# Patient Record
Sex: FEMALE | Race: WHITE | Employment: UNEMPLOYED | URBAN - METROPOLITAN AREA
[De-identification: names, ages, dates, MRNs, and addresses within clinical notes are randomized per-mention and may not be internally consistent; named-entity substitution may affect disease eponyms.]

---

## 2017-06-26 ENCOUNTER — OFFICE VISIT (OUTPATIENT)
Dept: ORTHOPEDIC SURGERY | Age: 77
End: 2017-06-26

## 2017-06-26 VITALS — WEIGHT: 289 LBS | HEIGHT: 66 IN | BODY MASS INDEX: 46.45 KG/M2

## 2017-06-26 DIAGNOSIS — M25.511 RIGHT SHOULDER PAIN, UNSPECIFIED CHRONICITY: ICD-10-CM

## 2017-06-26 DIAGNOSIS — M25.561 PAIN IN BOTH KNEES, UNSPECIFIED CHRONICITY: Primary | ICD-10-CM

## 2017-06-26 DIAGNOSIS — M25.562 PAIN IN BOTH KNEES, UNSPECIFIED CHRONICITY: Primary | ICD-10-CM

## 2017-06-26 DIAGNOSIS — M79.671 RIGHT FOOT PAIN: ICD-10-CM

## 2017-06-26 PROCEDURE — 20610 DRAIN/INJ JOINT/BURSA W/O US: CPT | Performed by: ORTHOPAEDIC SURGERY

## 2017-06-26 PROCEDURE — 3017F COLORECTAL CA SCREEN DOC REV: CPT | Performed by: ORTHOPAEDIC SURGERY

## 2017-06-26 PROCEDURE — 73562 X-RAY EXAM OF KNEE 3: CPT | Performed by: ORTHOPAEDIC SURGERY

## 2017-06-26 PROCEDURE — 1123F ACP DISCUSS/DSCN MKR DOCD: CPT | Performed by: ORTHOPAEDIC SURGERY

## 2017-06-26 PROCEDURE — 1036F TOBACCO NON-USER: CPT | Performed by: ORTHOPAEDIC SURGERY

## 2017-06-26 PROCEDURE — 99214 OFFICE O/P EST MOD 30 MIN: CPT | Performed by: ORTHOPAEDIC SURGERY

## 2017-06-26 PROCEDURE — G8427 DOCREV CUR MEDS BY ELIG CLIN: HCPCS | Performed by: ORTHOPAEDIC SURGERY

## 2017-06-26 PROCEDURE — L3260 AMBULATORY SURGICAL BOOT EAC: HCPCS | Performed by: ORTHOPAEDIC SURGERY

## 2017-06-26 PROCEDURE — G8417 CALC BMI ABV UP PARAM F/U: HCPCS | Performed by: ORTHOPAEDIC SURGERY

## 2017-06-26 PROCEDURE — 1090F PRES/ABSN URINE INCON ASSESS: CPT | Performed by: ORTHOPAEDIC SURGERY

## 2017-06-26 PROCEDURE — 73630 X-RAY EXAM OF FOOT: CPT | Performed by: ORTHOPAEDIC SURGERY

## 2017-06-26 PROCEDURE — G8400 PT W/DXA NO RESULTS DOC: HCPCS | Performed by: ORTHOPAEDIC SURGERY

## 2017-06-26 PROCEDURE — 73030 X-RAY EXAM OF SHOULDER: CPT | Performed by: ORTHOPAEDIC SURGERY

## 2017-06-26 PROCEDURE — 4040F PNEUMOC VAC/ADMIN/RCVD: CPT | Performed by: ORTHOPAEDIC SURGERY

## 2017-11-17 PROBLEM — K85.90 ACUTE PANCREATITIS: Status: ACTIVE | Noted: 2017-11-17

## 2018-05-07 ENCOUNTER — OFFICE VISIT (OUTPATIENT)
Dept: ORTHOPEDIC SURGERY | Age: 78
End: 2018-05-07

## 2018-05-07 VITALS — HEIGHT: 65 IN | WEIGHT: 265 LBS | BODY MASS INDEX: 44.15 KG/M2

## 2018-05-07 DIAGNOSIS — M25.562 PAIN IN BOTH KNEES, UNSPECIFIED CHRONICITY: Primary | ICD-10-CM

## 2018-05-07 DIAGNOSIS — M25.561 PAIN IN BOTH KNEES, UNSPECIFIED CHRONICITY: Primary | ICD-10-CM

## 2018-05-07 DIAGNOSIS — M84.374S STRESS FRACTURE, RIGHT FOOT, SEQUELA: ICD-10-CM

## 2018-05-07 DIAGNOSIS — M19.071 PRIMARY OSTEOARTHRITIS OF RIGHT FOOT: ICD-10-CM

## 2018-05-07 DIAGNOSIS — M48.56XS NON-TRAUMATIC COMPRESSION FRACTURE OF FIFTH LUMBAR VERTEBRA, SEQUELA: ICD-10-CM

## 2018-05-07 PROCEDURE — G8400 PT W/DXA NO RESULTS DOC: HCPCS | Performed by: ORTHOPAEDIC SURGERY

## 2018-05-07 PROCEDURE — L4361 PNEUMA/VAC WALK BOOT PRE OTS: HCPCS | Performed by: ORTHOPAEDIC SURGERY

## 2018-05-07 PROCEDURE — 1090F PRES/ABSN URINE INCON ASSESS: CPT | Performed by: ORTHOPAEDIC SURGERY

## 2018-05-07 PROCEDURE — 4040F PNEUMOC VAC/ADMIN/RCVD: CPT | Performed by: ORTHOPAEDIC SURGERY

## 2018-05-07 PROCEDURE — G8427 DOCREV CUR MEDS BY ELIG CLIN: HCPCS | Performed by: ORTHOPAEDIC SURGERY

## 2018-05-07 PROCEDURE — 1036F TOBACCO NON-USER: CPT | Performed by: ORTHOPAEDIC SURGERY

## 2018-05-07 PROCEDURE — 99214 OFFICE O/P EST MOD 30 MIN: CPT | Performed by: ORTHOPAEDIC SURGERY

## 2018-05-07 PROCEDURE — 1123F ACP DISCUSS/DSCN MKR DOCD: CPT | Performed by: ORTHOPAEDIC SURGERY

## 2018-05-07 PROCEDURE — G8417 CALC BMI ABV UP PARAM F/U: HCPCS | Performed by: ORTHOPAEDIC SURGERY

## 2019-09-18 ENCOUNTER — OFFICE VISIT (OUTPATIENT)
Dept: ORTHOPEDIC SURGERY | Age: 79
End: 2019-09-18
Payer: MEDICARE

## 2019-09-18 VITALS — HEIGHT: 64 IN | BODY MASS INDEX: 44.9 KG/M2 | WEIGHT: 263 LBS

## 2019-09-18 DIAGNOSIS — M25.561 RIGHT KNEE PAIN, UNSPECIFIED CHRONICITY: Primary | ICD-10-CM

## 2019-09-18 PROCEDURE — G8400 PT W/DXA NO RESULTS DOC: HCPCS | Performed by: ORTHOPAEDIC SURGERY

## 2019-09-18 PROCEDURE — 99213 OFFICE O/P EST LOW 20 MIN: CPT | Performed by: ORTHOPAEDIC SURGERY

## 2019-09-18 PROCEDURE — 1036F TOBACCO NON-USER: CPT | Performed by: ORTHOPAEDIC SURGERY

## 2019-09-18 PROCEDURE — G8417 CALC BMI ABV UP PARAM F/U: HCPCS | Performed by: ORTHOPAEDIC SURGERY

## 2019-09-18 PROCEDURE — 1090F PRES/ABSN URINE INCON ASSESS: CPT | Performed by: ORTHOPAEDIC SURGERY

## 2019-09-18 PROCEDURE — 1123F ACP DISCUSS/DSCN MKR DOCD: CPT | Performed by: ORTHOPAEDIC SURGERY

## 2019-09-18 PROCEDURE — G8428 CUR MEDS NOT DOCUMENT: HCPCS | Performed by: ORTHOPAEDIC SURGERY

## 2019-09-18 PROCEDURE — 4040F PNEUMOC VAC/ADMIN/RCVD: CPT | Performed by: ORTHOPAEDIC SURGERY

## 2019-09-18 RX ORDER — PREDNISONE 10 MG/1
TABLET ORAL
Qty: 26 TABLET | Refills: 0 | Status: SHIPPED | OUTPATIENT
Start: 2019-09-18 | End: 2019-11-06 | Stop reason: ALTCHOICE

## 2019-09-18 NOTE — PROGRESS NOTES
MD Marine Wallace, [place]         Orthopaedic Surgery and Sports Medicine      Patient Name: Angela Mcintyre  YOB: 1940  Patient's PCP is Jania Gómez MD    SUBJECTIVE  Chief Complaint:  Knee Pain (RIGHT   )      History of Present Illness:  Angela Mcintyre is a 69 y.o. female here regarding right knee pain. This is a lady well-known to us she is status post a previous right total knee arthroplasty. That arthroplasty was done many years ago in 1999. [facility] She is significantly obese current BMI is 45. She has some difficulty with ambulation because of her size. She is been having about 3 weeks of discomfort in her right knee she does not recall an injury. There is a moderate amount of swelling and she has had some swelling in her lower leg now for some time. There was not a history of injury. The patient is currently ambulating with assistance. [facility] History of swelling: Yes  History of \"giving way\": Yes  History of instability: Yes  History of popping and/or catching: Yes    The pain is located global.   The patient describes the symptoms as aching and sharp. She rates pain at 7/10. Symptoms improve with rest.   The symptoms are made worse with activity. Sleep pattern is affected by the chief complaint: Yes    The patient has not had PT. The patient has not had an injection. The patient has not taken NSAIDs. The patient is not working.       Pain Assessment:  Pain Assessment  Location of Pain: Knee  Location Modifiers: Right  Severity of Pain: 7  Quality of Pain: Dull, Aching, Popping  Frequency of Pain: Intermittent  Aggravating Factors: Standing, Walking, Stairs, Squatting, Kneeling, Bending  Limiting Behavior: Some  Relieving Factors: Rest, Ice, Other (Comment)  Result of Injury: No  Work-Related Injury: No  Are there other pain locations you wish to

## 2019-11-06 ENCOUNTER — APPOINTMENT (OUTPATIENT)
Dept: GENERAL RADIOLOGY | Age: 79
End: 2019-11-06
Payer: MEDICARE

## 2019-11-06 ENCOUNTER — HOSPITAL ENCOUNTER (OUTPATIENT)
Age: 79
Setting detail: OBSERVATION
Discharge: HOME | End: 2019-11-07
Attending: EMERGENCY MEDICINE | Admitting: INTERNAL MEDICINE
Payer: MEDICARE

## 2019-11-06 DIAGNOSIS — Z79.01 CHRONIC ANTICOAGULATION: ICD-10-CM

## 2019-11-06 DIAGNOSIS — R07.9 CHEST PAIN, UNSPECIFIED TYPE: Primary | ICD-10-CM

## 2019-11-06 LAB
A/G RATIO: 1.1 (ref 1.1–2.2)
ALBUMIN SERPL-MCNC: 3.5 G/DL (ref 3.4–5)
ALP BLD-CCNC: 77 U/L (ref 40–129)
ALT SERPL-CCNC: 11 U/L (ref 10–40)
ANION GAP SERPL CALCULATED.3IONS-SCNC: 13 MMOL/L (ref 3–16)
AST SERPL-CCNC: 27 U/L (ref 15–37)
BASOPHILS ABSOLUTE: 0.1 K/UL (ref 0–0.2)
BASOPHILS RELATIVE PERCENT: 0.8 %
BILIRUB SERPL-MCNC: 1.4 MG/DL (ref 0–1)
BILIRUBIN DIRECT: <0.2 MG/DL (ref 0–0.3)
BILIRUBIN, INDIRECT: NORMAL MG/DL (ref 0–1)
BUN BLDV-MCNC: 15 MG/DL (ref 7–20)
CALCIUM SERPL-MCNC: 8.9 MG/DL (ref 8.3–10.6)
CHLORIDE BLD-SCNC: 96 MMOL/L (ref 99–110)
CO2: 26 MMOL/L (ref 21–32)
CREAT SERPL-MCNC: 0.6 MG/DL (ref 0.6–1.2)
EOSINOPHILS ABSOLUTE: 0.2 K/UL (ref 0–0.6)
EOSINOPHILS RELATIVE PERCENT: 2.4 %
GFR AFRICAN AMERICAN: >60
GFR NON-AFRICAN AMERICAN: >60
GLOBULIN: 3.1 G/DL
GLUCOSE BLD-MCNC: 112 MG/DL (ref 70–99)
HCT VFR BLD CALC: 41.2 % (ref 36–48)
HEMOGLOBIN: 13.8 G/DL (ref 12–16)
INR BLD: 2.99 (ref 0.86–1.14)
LIPASE: 15 U/L (ref 13–60)
LYMPHOCYTES ABSOLUTE: 1.4 K/UL (ref 1–5.1)
LYMPHOCYTES RELATIVE PERCENT: 18.1 %
MCH RBC QN AUTO: 31.7 PG (ref 26–34)
MCHC RBC AUTO-ENTMCNC: 33.6 G/DL (ref 31–36)
MCV RBC AUTO: 94.3 FL (ref 80–100)
MONOCYTES ABSOLUTE: 0.8 K/UL (ref 0–1.3)
MONOCYTES RELATIVE PERCENT: 10 %
NEUTROPHILS ABSOLUTE: 5.5 K/UL (ref 1.7–7.7)
NEUTROPHILS RELATIVE PERCENT: 68.7 %
PDW BLD-RTO: 14.1 % (ref 12.4–15.4)
PLATELET # BLD: 218 K/UL (ref 135–450)
PMV BLD AUTO: 8.4 FL (ref 5–10.5)
POTASSIUM REFLEX MAGNESIUM: 4.4 MMOL/L (ref 3.5–5.1)
PRO-BNP: 995 PG/ML (ref 0–449)
PROTHROMBIN TIME: 34.1 SEC (ref 9.8–13)
RBC # BLD: 4.37 M/UL (ref 4–5.2)
SODIUM BLD-SCNC: 135 MMOL/L (ref 136–145)
TOTAL PROTEIN: 6.6 G/DL (ref 6.4–8.2)
TROPONIN: <0.01 NG/ML
WBC # BLD: 7.9 K/UL (ref 4–11)

## 2019-11-06 PROCEDURE — G0378 HOSPITAL OBSERVATION PER HR: HCPCS

## 2019-11-06 PROCEDURE — 80053 COMPREHEN METABOLIC PANEL: CPT

## 2019-11-06 PROCEDURE — 71046 X-RAY EXAM CHEST 2 VIEWS: CPT

## 2019-11-06 PROCEDURE — 36415 COLL VENOUS BLD VENIPUNCTURE: CPT

## 2019-11-06 PROCEDURE — 83690 ASSAY OF LIPASE: CPT

## 2019-11-06 PROCEDURE — 84484 ASSAY OF TROPONIN QUANT: CPT

## 2019-11-06 PROCEDURE — 85025 COMPLETE CBC W/AUTO DIFF WBC: CPT

## 2019-11-06 PROCEDURE — 85610 PROTHROMBIN TIME: CPT

## 2019-11-06 PROCEDURE — 99285 EMERGENCY DEPT VISIT HI MDM: CPT

## 2019-11-06 PROCEDURE — 2580000003 HC RX 258: Performed by: INTERNAL MEDICINE

## 2019-11-06 PROCEDURE — 93005 ELECTROCARDIOGRAM TRACING: CPT | Performed by: EMERGENCY MEDICINE

## 2019-11-06 PROCEDURE — 83880 ASSAY OF NATRIURETIC PEPTIDE: CPT

## 2019-11-06 PROCEDURE — 6370000000 HC RX 637 (ALT 250 FOR IP): Performed by: INTERNAL MEDICINE

## 2019-11-06 RX ORDER — FUROSEMIDE 80 MG
80 TABLET ORAL 2 TIMES DAILY
Status: DISCONTINUED | OUTPATIENT
Start: 2019-11-06 | End: 2019-11-07 | Stop reason: HOSPADM

## 2019-11-06 RX ORDER — ACETAMINOPHEN 325 MG/1
650 TABLET ORAL EVERY 4 HOURS PRN
Status: DISCONTINUED | OUTPATIENT
Start: 2019-11-06 | End: 2019-11-07 | Stop reason: HOSPADM

## 2019-11-06 RX ORDER — ATORVASTATIN CALCIUM 40 MG/1
40 TABLET, FILM COATED ORAL NIGHTLY
Status: DISCONTINUED | OUTPATIENT
Start: 2019-11-06 | End: 2019-11-07 | Stop reason: HOSPADM

## 2019-11-06 RX ORDER — CITALOPRAM 20 MG/1
40 TABLET ORAL DAILY
Status: DISCONTINUED | OUTPATIENT
Start: 2019-11-06 | End: 2019-11-07 | Stop reason: HOSPADM

## 2019-11-06 RX ORDER — ASPIRIN 81 MG/1
81 TABLET, CHEWABLE ORAL DAILY
Status: DISCONTINUED | OUTPATIENT
Start: 2019-11-07 | End: 2019-11-07 | Stop reason: HOSPADM

## 2019-11-06 RX ORDER — ATENOLOL 25 MG/1
50 TABLET ORAL DAILY
Status: DISCONTINUED | OUTPATIENT
Start: 2019-11-06 | End: 2019-11-07 | Stop reason: HOSPADM

## 2019-11-06 RX ORDER — SODIUM CHLORIDE 0.9 % (FLUSH) 0.9 %
10 SYRINGE (ML) INJECTION EVERY 12 HOURS SCHEDULED
Status: DISCONTINUED | OUTPATIENT
Start: 2019-11-06 | End: 2019-11-07 | Stop reason: HOSPADM

## 2019-11-06 RX ORDER — NITROGLYCERIN 0.4 MG/1
0.4 TABLET SUBLINGUAL EVERY 5 MIN PRN
Status: DISCONTINUED | OUTPATIENT
Start: 2019-11-06 | End: 2019-11-07 | Stop reason: HOSPADM

## 2019-11-06 RX ORDER — GABAPENTIN 100 MG/1
100 CAPSULE ORAL 2 TIMES DAILY
COMMUNITY

## 2019-11-06 RX ORDER — SODIUM CHLORIDE 0.9 % (FLUSH) 0.9 %
10 SYRINGE (ML) INJECTION PRN
Status: DISCONTINUED | OUTPATIENT
Start: 2019-11-06 | End: 2019-11-07 | Stop reason: HOSPADM

## 2019-11-06 RX ORDER — GABAPENTIN 100 MG/1
100 CAPSULE ORAL 2 TIMES DAILY
Status: DISCONTINUED | OUTPATIENT
Start: 2019-11-06 | End: 2019-11-07 | Stop reason: HOSPADM

## 2019-11-06 RX ORDER — ALPRAZOLAM 0.25 MG/1
0.25 TABLET ORAL 3 TIMES DAILY PRN
Status: DISCONTINUED | OUTPATIENT
Start: 2019-11-06 | End: 2019-11-07 | Stop reason: HOSPADM

## 2019-11-06 RX ORDER — ONDANSETRON 2 MG/ML
4 INJECTION INTRAMUSCULAR; INTRAVENOUS EVERY 6 HOURS PRN
Status: DISCONTINUED | OUTPATIENT
Start: 2019-11-06 | End: 2019-11-07 | Stop reason: HOSPADM

## 2019-11-06 RX ORDER — LISINOPRIL 10 MG/1
10 TABLET ORAL DAILY
Status: DISCONTINUED | OUTPATIENT
Start: 2019-11-06 | End: 2019-11-07 | Stop reason: HOSPADM

## 2019-11-06 RX ADMIN — CITALOPRAM HYDROBROMIDE 40 MG: 20 TABLET ORAL at 18:35

## 2019-11-06 RX ADMIN — ATORVASTATIN CALCIUM 40 MG: 40 TABLET, FILM COATED ORAL at 19:54

## 2019-11-06 RX ADMIN — LISINOPRIL 10 MG: 10 TABLET ORAL at 18:36

## 2019-11-06 RX ADMIN — WARFARIN SODIUM 3 MG: 1 TABLET ORAL at 18:36

## 2019-11-06 RX ADMIN — Medication 10 ML: at 19:54

## 2019-11-06 RX ADMIN — ATENOLOL 50 MG: 25 TABLET ORAL at 18:36

## 2019-11-06 RX ADMIN — GABAPENTIN 100 MG: 100 CAPSULE ORAL at 19:53

## 2019-11-06 ASSESSMENT — PAIN SCALES - GENERAL
PAINLEVEL_OUTOF10: 0
PAINLEVEL_OUTOF10: 0

## 2019-11-07 ENCOUNTER — APPOINTMENT (OUTPATIENT)
Dept: NUCLEAR MEDICINE | Age: 79
End: 2019-11-07
Payer: MEDICARE

## 2019-11-07 VITALS
DIASTOLIC BLOOD PRESSURE: 66 MMHG | SYSTOLIC BLOOD PRESSURE: 131 MMHG | TEMPERATURE: 98.1 F | HEIGHT: 64 IN | BODY MASS INDEX: 45.91 KG/M2 | HEART RATE: 76 BPM | WEIGHT: 268.9 LBS | OXYGEN SATURATION: 96 % | RESPIRATION RATE: 18 BRPM

## 2019-11-07 LAB
ANION GAP SERPL CALCULATED.3IONS-SCNC: 10 MMOL/L (ref 3–16)
BUN BLDV-MCNC: 17 MG/DL (ref 7–20)
CALCIUM SERPL-MCNC: 8.8 MG/DL (ref 8.3–10.6)
CHLORIDE BLD-SCNC: 103 MMOL/L (ref 99–110)
CO2: 27 MMOL/L (ref 21–32)
CREAT SERPL-MCNC: 0.6 MG/DL (ref 0.6–1.2)
GFR AFRICAN AMERICAN: >60
GFR NON-AFRICAN AMERICAN: >60
GLUCOSE BLD-MCNC: 109 MG/DL (ref 70–99)
HCT VFR BLD CALC: 37.3 % (ref 36–48)
HEMOGLOBIN: 12.6 G/DL (ref 12–16)
INR BLD: 3.38 (ref 0.86–1.14)
LV EF: 77 %
LVEF MODALITY: NORMAL
MCH RBC QN AUTO: 32 PG (ref 26–34)
MCHC RBC AUTO-ENTMCNC: 33.9 G/DL (ref 31–36)
MCV RBC AUTO: 94.7 FL (ref 80–100)
PDW BLD-RTO: 14 % (ref 12.4–15.4)
PLATELET # BLD: 194 K/UL (ref 135–450)
PMV BLD AUTO: 8.2 FL (ref 5–10.5)
POTASSIUM REFLEX MAGNESIUM: 3.9 MMOL/L (ref 3.5–5.1)
PROTHROMBIN TIME: 38.5 SEC (ref 9.8–13)
RBC # BLD: 3.95 M/UL (ref 4–5.2)
SODIUM BLD-SCNC: 140 MMOL/L (ref 136–145)
WBC # BLD: 8.1 K/UL (ref 4–11)

## 2019-11-07 PROCEDURE — G0378 HOSPITAL OBSERVATION PER HR: HCPCS

## 2019-11-07 PROCEDURE — 36415 COLL VENOUS BLD VENIPUNCTURE: CPT

## 2019-11-07 PROCEDURE — 2580000003 HC RX 258: Performed by: INTERNAL MEDICINE

## 2019-11-07 PROCEDURE — 97535 SELF CARE MNGMENT TRAINING: CPT

## 2019-11-07 PROCEDURE — 97165 OT EVAL LOW COMPLEX 30 MIN: CPT

## 2019-11-07 PROCEDURE — 6360000002 HC RX W HCPCS: Performed by: INTERNAL MEDICINE

## 2019-11-07 PROCEDURE — 80048 BASIC METABOLIC PNL TOTAL CA: CPT

## 2019-11-07 PROCEDURE — 93017 CV STRESS TEST TRACING ONLY: CPT

## 2019-11-07 PROCEDURE — 97530 THERAPEUTIC ACTIVITIES: CPT

## 2019-11-07 PROCEDURE — 3430000000 HC RX DIAGNOSTIC RADIOPHARMACEUTICAL: Performed by: INTERNAL MEDICINE

## 2019-11-07 PROCEDURE — 78452 HT MUSCLE IMAGE SPECT MULT: CPT

## 2019-11-07 PROCEDURE — 97161 PT EVAL LOW COMPLEX 20 MIN: CPT

## 2019-11-07 PROCEDURE — 97116 GAIT TRAINING THERAPY: CPT

## 2019-11-07 PROCEDURE — 85610 PROTHROMBIN TIME: CPT

## 2019-11-07 PROCEDURE — 6370000000 HC RX 637 (ALT 250 FOR IP): Performed by: INTERNAL MEDICINE

## 2019-11-07 PROCEDURE — 85027 COMPLETE CBC AUTOMATED: CPT

## 2019-11-07 PROCEDURE — A9502 TC99M TETROFOSMIN: HCPCS | Performed by: INTERNAL MEDICINE

## 2019-11-07 RX ADMIN — ATENOLOL 50 MG: 25 TABLET ORAL at 13:21

## 2019-11-07 RX ADMIN — Medication 10 ML: at 07:45

## 2019-11-07 RX ADMIN — ALPRAZOLAM 0.25 MG: 0.25 TABLET ORAL at 03:56

## 2019-11-07 RX ADMIN — REGADENOSON 0.4 MG: 0.08 INJECTION, SOLUTION INTRAVENOUS at 10:45

## 2019-11-07 RX ADMIN — TETROFOSMIN 35 MILLICURIE: 1.38 INJECTION, POWDER, LYOPHILIZED, FOR SOLUTION INTRAVENOUS at 09:44

## 2019-11-07 RX ADMIN — CITALOPRAM HYDROBROMIDE 40 MG: 20 TABLET ORAL at 13:22

## 2019-11-07 RX ADMIN — FUROSEMIDE 80 MG: 80 TABLET ORAL at 13:21

## 2019-11-07 RX ADMIN — ACETAMINOPHEN 650 MG: 325 TABLET ORAL at 13:20

## 2019-11-07 RX ADMIN — GABAPENTIN 100 MG: 100 CAPSULE ORAL at 13:21

## 2019-11-07 RX ADMIN — TETROFOSMIN 12 MILLICURIE: 1.38 INJECTION, POWDER, LYOPHILIZED, FOR SOLUTION INTRAVENOUS at 08:17

## 2019-11-07 RX ADMIN — LISINOPRIL 10 MG: 10 TABLET ORAL at 13:21

## 2019-11-07 ASSESSMENT — PAIN DESCRIPTION - LOCATION: LOCATION: SHOULDER

## 2019-11-07 ASSESSMENT — PAIN SCALES - GENERAL
PAINLEVEL_OUTOF10: 4
PAINLEVEL_OUTOF10: 3

## 2019-11-07 ASSESSMENT — PAIN DESCRIPTION - ORIENTATION: ORIENTATION: LEFT

## 2019-11-08 LAB
EKG ATRIAL RATE: 64 BPM
EKG DIAGNOSIS: NORMAL
EKG Q-T INTERVAL: 394 MS
EKG QRS DURATION: 84 MS
EKG QTC CALCULATION (BAZETT): 425 MS
EKG R AXIS: -23 DEGREES
EKG T AXIS: -30 DEGREES
EKG VENTRICULAR RATE: 70 BPM

## 2019-11-08 PROCEDURE — 93010 ELECTROCARDIOGRAM REPORT: CPT | Performed by: INTERNAL MEDICINE

## 2019-11-11 ENCOUNTER — APPOINTMENT (OUTPATIENT)
Dept: CT IMAGING | Age: 79
End: 2019-11-11
Payer: MEDICARE

## 2019-11-11 ENCOUNTER — APPOINTMENT (OUTPATIENT)
Dept: GENERAL RADIOLOGY | Age: 79
End: 2019-11-11
Payer: MEDICARE

## 2019-11-11 ENCOUNTER — HOSPITAL ENCOUNTER (EMERGENCY)
Age: 79
Discharge: HOME | End: 2019-11-11
Attending: EMERGENCY MEDICINE
Payer: MEDICARE

## 2019-11-11 VITALS
HEART RATE: 86 BPM | HEIGHT: 64 IN | DIASTOLIC BLOOD PRESSURE: 81 MMHG | SYSTOLIC BLOOD PRESSURE: 149 MMHG | TEMPERATURE: 97.6 F | OXYGEN SATURATION: 99 % | BODY MASS INDEX: 44.39 KG/M2 | RESPIRATION RATE: 20 BRPM | WEIGHT: 260 LBS

## 2019-11-11 DIAGNOSIS — S60.219A CONTUSION OF WRIST, UNSPECIFIED LATERALITY, INITIAL ENCOUNTER: ICD-10-CM

## 2019-11-11 DIAGNOSIS — S09.90XA INJURY OF HEAD, INITIAL ENCOUNTER: Primary | ICD-10-CM

## 2019-11-11 DIAGNOSIS — S80.00XA CONTUSION OF KNEE, UNSPECIFIED LATERALITY, INITIAL ENCOUNTER: ICD-10-CM

## 2019-11-11 DIAGNOSIS — S01.81XA FACIAL LACERATION, INITIAL ENCOUNTER: ICD-10-CM

## 2019-11-11 DIAGNOSIS — S21.91XA LACERATION OF CHEST, INITIAL ENCOUNTER: ICD-10-CM

## 2019-11-11 DIAGNOSIS — N30.00 ACUTE CYSTITIS WITHOUT HEMATURIA: ICD-10-CM

## 2019-11-11 DIAGNOSIS — S00.83XA CONTUSION OF FACE, INITIAL ENCOUNTER: ICD-10-CM

## 2019-11-11 LAB
A/G RATIO: 1.1 (ref 1.1–2.2)
ALBUMIN SERPL-MCNC: 3.5 G/DL (ref 3.4–5)
ALP BLD-CCNC: 81 U/L (ref 40–129)
ALT SERPL-CCNC: 11 U/L (ref 10–40)
ANION GAP SERPL CALCULATED.3IONS-SCNC: 11 MMOL/L (ref 3–16)
AST SERPL-CCNC: 19 U/L (ref 15–37)
BACTERIA: ABNORMAL /HPF
BASOPHILS ABSOLUTE: 0.1 K/UL (ref 0–0.2)
BASOPHILS RELATIVE PERCENT: 0.7 %
BILIRUB SERPL-MCNC: 1.2 MG/DL (ref 0–1)
BILIRUBIN URINE: NEGATIVE
BLOOD, URINE: ABNORMAL
BUN BLDV-MCNC: 23 MG/DL (ref 7–20)
CALCIUM SERPL-MCNC: 9.1 MG/DL (ref 8.3–10.6)
CHLORIDE BLD-SCNC: 104 MMOL/L (ref 99–110)
CLARITY: ABNORMAL
CO2: 26 MMOL/L (ref 21–32)
COLOR: YELLOW
CREAT SERPL-MCNC: 0.7 MG/DL (ref 0.6–1.2)
EKG ATRIAL RATE: 107 BPM
EKG DIAGNOSIS: NORMAL
EKG Q-T INTERVAL: 374 MS
EKG QRS DURATION: 66 MS
EKG QTC CALCULATION (BAZETT): 436 MS
EKG R AXIS: -7 DEGREES
EKG T AXIS: -19 DEGREES
EKG VENTRICULAR RATE: 82 BPM
EOSINOPHILS ABSOLUTE: 0.2 K/UL (ref 0–0.6)
EOSINOPHILS RELATIVE PERCENT: 2 %
EPITHELIAL CELLS, UA: ABNORMAL /HPF
GFR AFRICAN AMERICAN: >60
GFR NON-AFRICAN AMERICAN: >60
GLOBULIN: 3.1 G/DL
GLUCOSE BLD-MCNC: 121 MG/DL (ref 70–99)
GLUCOSE URINE: NEGATIVE MG/DL
HCT VFR BLD CALC: 39.2 % (ref 36–48)
HEMOGLOBIN: 13.1 G/DL (ref 12–16)
INR BLD: 1.88 (ref 0.86–1.14)
KETONES, URINE: NEGATIVE MG/DL
LEUKOCYTE ESTERASE, URINE: ABNORMAL
LYMPHOCYTES ABSOLUTE: 1.4 K/UL (ref 1–5.1)
LYMPHOCYTES RELATIVE PERCENT: 15.1 %
MCH RBC QN AUTO: 31.4 PG (ref 26–34)
MCHC RBC AUTO-ENTMCNC: 33.4 G/DL (ref 31–36)
MCV RBC AUTO: 94 FL (ref 80–100)
MICROSCOPIC EXAMINATION: YES
MONOCYTES ABSOLUTE: 0.8 K/UL (ref 0–1.3)
MONOCYTES RELATIVE PERCENT: 8.4 %
NEUTROPHILS ABSOLUTE: 6.6 K/UL (ref 1.7–7.7)
NEUTROPHILS RELATIVE PERCENT: 73.8 %
NITRITE, URINE: POSITIVE
PDW BLD-RTO: 14 % (ref 12.4–15.4)
PH UA: 6 (ref 5–8)
PLATELET # BLD: 223 K/UL (ref 135–450)
PMV BLD AUTO: 8.6 FL (ref 5–10.5)
POTASSIUM REFLEX MAGNESIUM: 4.3 MMOL/L (ref 3.5–5.1)
PROTEIN UA: ABNORMAL MG/DL
PROTHROMBIN TIME: 21 SEC (ref 9.8–13)
RBC # BLD: 4.17 M/UL (ref 4–5.2)
RBC UA: ABNORMAL /HPF (ref 0–2)
SODIUM BLD-SCNC: 141 MMOL/L (ref 136–145)
SPECIFIC GRAVITY UA: 1.01 (ref 1–1.03)
TOTAL PROTEIN: 6.6 G/DL (ref 6.4–8.2)
URINE REFLEX TO CULTURE: YES
URINE TYPE: ABNORMAL
UROBILINOGEN, URINE: 0.2 E.U./DL
WBC # BLD: 8.9 K/UL (ref 4–11)
WBC UA: ABNORMAL /HPF (ref 0–5)

## 2019-11-11 PROCEDURE — 70486 CT MAXILLOFACIAL W/O DYE: CPT

## 2019-11-11 PROCEDURE — 85025 COMPLETE CBC W/AUTO DIFF WBC: CPT

## 2019-11-11 PROCEDURE — 73560 X-RAY EXAM OF KNEE 1 OR 2: CPT

## 2019-11-11 PROCEDURE — 80053 COMPREHEN METABOLIC PANEL: CPT

## 2019-11-11 PROCEDURE — 73030 X-RAY EXAM OF SHOULDER: CPT

## 2019-11-11 PROCEDURE — 87077 CULTURE AEROBIC IDENTIFY: CPT

## 2019-11-11 PROCEDURE — 85610 PROTHROMBIN TIME: CPT

## 2019-11-11 PROCEDURE — 87086 URINE CULTURE/COLONY COUNT: CPT

## 2019-11-11 PROCEDURE — 87186 SC STD MICRODIL/AGAR DIL: CPT

## 2019-11-11 PROCEDURE — 81001 URINALYSIS AUTO W/SCOPE: CPT

## 2019-11-11 PROCEDURE — 73110 X-RAY EXAM OF WRIST: CPT

## 2019-11-11 PROCEDURE — 93010 ELECTROCARDIOGRAM REPORT: CPT | Performed by: INTERNAL MEDICINE

## 2019-11-11 PROCEDURE — 93005 ELECTROCARDIOGRAM TRACING: CPT | Performed by: EMERGENCY MEDICINE

## 2019-11-11 PROCEDURE — 36415 COLL VENOUS BLD VENIPUNCTURE: CPT

## 2019-11-11 PROCEDURE — 70450 CT HEAD/BRAIN W/O DYE: CPT

## 2019-11-11 PROCEDURE — 71045 X-RAY EXAM CHEST 1 VIEW: CPT

## 2019-11-11 PROCEDURE — 99285 EMERGENCY DEPT VISIT HI MDM: CPT

## 2019-11-11 PROCEDURE — 72125 CT NECK SPINE W/O DYE: CPT

## 2019-11-11 PROCEDURE — 6370000000 HC RX 637 (ALT 250 FOR IP): Performed by: EMERGENCY MEDICINE

## 2019-11-11 RX ORDER — HYDROCODONE BITARTRATE AND ACETAMINOPHEN 5; 325 MG/1; MG/1
1 TABLET ORAL ONCE
Status: COMPLETED | OUTPATIENT
Start: 2019-11-11 | End: 2019-11-11

## 2019-11-11 RX ORDER — NITROFURANTOIN 25; 75 MG/1; MG/1
100 CAPSULE ORAL 2 TIMES DAILY
Qty: 20 CAPSULE | Refills: 0 | Status: SHIPPED | OUTPATIENT
Start: 2019-11-11 | End: 2019-11-21

## 2019-11-11 RX ORDER — HYDROCODONE BITARTRATE AND ACETAMINOPHEN 5; 325 MG/1; MG/1
1 TABLET ORAL EVERY 6 HOURS PRN
Qty: 20 TABLET | Refills: 0 | Status: SHIPPED | OUTPATIENT
Start: 2019-11-11 | End: 2019-11-16

## 2019-11-11 RX ADMIN — HYDROCODONE BITARTRATE AND ACETAMINOPHEN 1 TABLET: 5; 325 TABLET ORAL at 09:50

## 2019-11-11 ASSESSMENT — ENCOUNTER SYMPTOMS
EYE DISCHARGE: 0
SORE THROAT: 0
NAUSEA: 0
VOMITING: 0
COUGH: 0
BACK PAIN: 0
DIARRHEA: 0
ABDOMINAL PAIN: 0

## 2019-11-11 ASSESSMENT — PAIN DESCRIPTION - FREQUENCY
FREQUENCY: INTERMITTENT
FREQUENCY: INTERMITTENT

## 2019-11-11 ASSESSMENT — PAIN DESCRIPTION - PAIN TYPE
TYPE: ACUTE PAIN
TYPE: ACUTE PAIN

## 2019-11-11 ASSESSMENT — PAIN DESCRIPTION - DESCRIPTORS
DESCRIPTORS: ACHING;DISCOMFORT
DESCRIPTORS: ACHING;DISCOMFORT

## 2019-11-11 ASSESSMENT — PAIN DESCRIPTION - ORIENTATION
ORIENTATION: RIGHT;LEFT
ORIENTATION: RIGHT;LEFT

## 2019-11-11 ASSESSMENT — PAIN SCALES - GENERAL
PAINLEVEL_OUTOF10: 7
PAINLEVEL_OUTOF10: 6
PAINLEVEL_OUTOF10: 6

## 2019-11-13 LAB
ORGANISM: ABNORMAL
URINE CULTURE, ROUTINE: ABNORMAL

## 2019-11-15 ENCOUNTER — HOSPITAL ENCOUNTER (EMERGENCY)
Age: 79
Discharge: HOME | End: 2019-11-15
Attending: EMERGENCY MEDICINE
Payer: MEDICARE

## 2019-11-15 ENCOUNTER — APPOINTMENT (OUTPATIENT)
Dept: GENERAL RADIOLOGY | Age: 79
End: 2019-11-15
Payer: MEDICARE

## 2019-11-15 ENCOUNTER — APPOINTMENT (OUTPATIENT)
Dept: CT IMAGING | Age: 79
End: 2019-11-15
Payer: MEDICARE

## 2019-11-15 VITALS
RESPIRATION RATE: 18 BRPM | OXYGEN SATURATION: 95 % | DIASTOLIC BLOOD PRESSURE: 72 MMHG | BODY MASS INDEX: 44.39 KG/M2 | TEMPERATURE: 97.7 F | SYSTOLIC BLOOD PRESSURE: 130 MMHG | HEART RATE: 88 BPM | HEIGHT: 64 IN | WEIGHT: 260 LBS

## 2019-11-15 DIAGNOSIS — N30.00 ACUTE CYSTITIS WITHOUT HEMATURIA: Primary | ICD-10-CM

## 2019-11-15 LAB
A/G RATIO: 0.9 (ref 1.1–2.2)
ABO/RH: NORMAL
ALBUMIN SERPL-MCNC: 2.9 G/DL (ref 3.4–5)
ALP BLD-CCNC: 77 U/L (ref 40–129)
ALT SERPL-CCNC: 12 U/L (ref 10–40)
ANION GAP SERPL CALCULATED.3IONS-SCNC: 10 MMOL/L (ref 3–16)
ANTIBODY SCREEN: NORMAL
APTT: 39.2 SEC (ref 26–36)
AST SERPL-CCNC: 19 U/L (ref 15–37)
BASOPHILS ABSOLUTE: 0 K/UL (ref 0–0.2)
BASOPHILS RELATIVE PERCENT: 0.5 %
BILIRUB SERPL-MCNC: 1.5 MG/DL (ref 0–1)
BILIRUBIN URINE: NEGATIVE
BLOOD, URINE: NEGATIVE
BUN BLDV-MCNC: 16 MG/DL (ref 7–20)
CALCIUM SERPL-MCNC: 8.7 MG/DL (ref 8.3–10.6)
CHLORIDE BLD-SCNC: 100 MMOL/L (ref 99–110)
CLARITY: CLEAR
CO2: 25 MMOL/L (ref 21–32)
COLOR: YELLOW
COMMENT UA: ABNORMAL
CREAT SERPL-MCNC: 0.7 MG/DL (ref 0.6–1.2)
EOSINOPHILS ABSOLUTE: 0.3 K/UL (ref 0–0.6)
EOSINOPHILS RELATIVE PERCENT: 2.9 %
GFR AFRICAN AMERICAN: >60
GFR NON-AFRICAN AMERICAN: >60
GLOBULIN: 3.2 G/DL
GLUCOSE BLD-MCNC: 113 MG/DL (ref 70–99)
GLUCOSE URINE: NEGATIVE MG/DL
HCT VFR BLD CALC: 38 % (ref 36–48)
HEMOGLOBIN: 12.8 G/DL (ref 12–16)
INR BLD: 2.05 (ref 0.86–1.14)
KETONES, URINE: NEGATIVE MG/DL
LEUKOCYTE ESTERASE, URINE: ABNORMAL
LYMPHOCYTES ABSOLUTE: 1.5 K/UL (ref 1–5.1)
LYMPHOCYTES RELATIVE PERCENT: 15.1 %
MCH RBC QN AUTO: 32.1 PG (ref 26–34)
MCHC RBC AUTO-ENTMCNC: 33.7 G/DL (ref 31–36)
MCV RBC AUTO: 95.4 FL (ref 80–100)
MICROSCOPIC EXAMINATION: YES
MONOCYTES ABSOLUTE: 1.1 K/UL (ref 0–1.3)
MONOCYTES RELATIVE PERCENT: 11.6 %
NEUTROPHILS ABSOLUTE: 6.9 K/UL (ref 1.7–7.7)
NEUTROPHILS RELATIVE PERCENT: 69.9 %
NITRITE, URINE: NEGATIVE
PDW BLD-RTO: 13.6 % (ref 12.4–15.4)
PH UA: 7 (ref 5–8)
PLATELET # BLD: 241 K/UL (ref 135–450)
PMV BLD AUTO: 8.6 FL (ref 5–10.5)
POTASSIUM REFLEX MAGNESIUM: 4.9 MMOL/L (ref 3.5–5.1)
PRO-BNP: 1401 PG/ML (ref 0–449)
PROTEIN UA: NEGATIVE MG/DL
PROTHROMBIN TIME: 23.4 SEC (ref 9.8–13)
RBC # BLD: 3.99 M/UL (ref 4–5.2)
RBC UA: ABNORMAL /HPF (ref 0–2)
SODIUM BLD-SCNC: 135 MMOL/L (ref 136–145)
SPECIFIC GRAVITY UA: 1.01 (ref 1–1.03)
TOTAL PROTEIN: 6.1 G/DL (ref 6.4–8.2)
TROPONIN: <0.01 NG/ML
URINE REFLEX TO CULTURE: YES
URINE TYPE: ABNORMAL
UROBILINOGEN, URINE: 4 E.U./DL
WBC # BLD: 9.8 K/UL (ref 4–11)
WBC UA: >100 /HPF (ref 0–5)

## 2019-11-15 PROCEDURE — 70450 CT HEAD/BRAIN W/O DYE: CPT

## 2019-11-15 PROCEDURE — 85610 PROTHROMBIN TIME: CPT

## 2019-11-15 PROCEDURE — 86850 RBC ANTIBODY SCREEN: CPT

## 2019-11-15 PROCEDURE — 86900 BLOOD TYPING SEROLOGIC ABO: CPT

## 2019-11-15 PROCEDURE — 85730 THROMBOPLASTIN TIME PARTIAL: CPT

## 2019-11-15 PROCEDURE — 81001 URINALYSIS AUTO W/SCOPE: CPT

## 2019-11-15 PROCEDURE — 99285 EMERGENCY DEPT VISIT HI MDM: CPT

## 2019-11-15 PROCEDURE — 86901 BLOOD TYPING SEROLOGIC RH(D): CPT

## 2019-11-15 PROCEDURE — 87086 URINE CULTURE/COLONY COUNT: CPT

## 2019-11-15 PROCEDURE — 71045 X-RAY EXAM CHEST 1 VIEW: CPT

## 2019-11-15 PROCEDURE — 51701 INSERT BLADDER CATHETER: CPT

## 2019-11-15 PROCEDURE — 6370000000 HC RX 637 (ALT 250 FOR IP): Performed by: EMERGENCY MEDICINE

## 2019-11-15 PROCEDURE — 80053 COMPREHEN METABOLIC PANEL: CPT

## 2019-11-15 PROCEDURE — 84484 ASSAY OF TROPONIN QUANT: CPT

## 2019-11-15 PROCEDURE — 93005 ELECTROCARDIOGRAM TRACING: CPT | Performed by: EMERGENCY MEDICINE

## 2019-11-15 PROCEDURE — 83880 ASSAY OF NATRIURETIC PEPTIDE: CPT

## 2019-11-15 PROCEDURE — 85025 COMPLETE CBC W/AUTO DIFF WBC: CPT

## 2019-11-15 RX ORDER — CIPROFLOXACIN 500 MG/1
500 TABLET, FILM COATED ORAL 2 TIMES DAILY
Qty: 20 TABLET | Refills: 0 | Status: SHIPPED | OUTPATIENT
Start: 2019-11-15 | End: 2019-11-25

## 2019-11-15 RX ORDER — CIPROFLOXACIN 500 MG/1
500 TABLET, FILM COATED ORAL ONCE
Status: COMPLETED | OUTPATIENT
Start: 2019-11-15 | End: 2019-11-15

## 2019-11-15 RX ADMIN — CIPROFLOXACIN HYDROCHLORIDE 500 MG: 500 TABLET, FILM COATED ORAL at 20:28

## 2019-11-15 ASSESSMENT — PAIN SCALES - GENERAL: PAINLEVEL_OUTOF10: 5

## 2019-11-15 ASSESSMENT — PAIN DESCRIPTION - ORIENTATION: ORIENTATION: RIGHT;LEFT

## 2019-11-15 ASSESSMENT — PAIN DESCRIPTION - LOCATION: LOCATION: KNEE

## 2019-11-16 LAB
EKG ATRIAL RATE: 381 BPM
EKG DIAGNOSIS: NORMAL
EKG Q-T INTERVAL: 410 MS
EKG QRS DURATION: 58 MS
EKG QTC CALCULATION (BAZETT): 457 MS
EKG R AXIS: -26 DEGREES
EKG T AXIS: -27 DEGREES
EKG VENTRICULAR RATE: 75 BPM
URINE CULTURE, ROUTINE: NORMAL

## 2019-11-16 PROCEDURE — 93010 ELECTROCARDIOGRAM REPORT: CPT | Performed by: INTERNAL MEDICINE

## 2019-11-25 ENCOUNTER — HOSPITAL ENCOUNTER (EMERGENCY)
Age: 79
Discharge: HOME | End: 2019-11-25
Payer: MEDICARE

## 2019-11-25 VITALS
DIASTOLIC BLOOD PRESSURE: 60 MMHG | TEMPERATURE: 98.4 F | HEIGHT: 64 IN | OXYGEN SATURATION: 97 % | HEART RATE: 67 BPM | RESPIRATION RATE: 20 BRPM | WEIGHT: 260 LBS | SYSTOLIC BLOOD PRESSURE: 116 MMHG | BODY MASS INDEX: 44.39 KG/M2

## 2019-11-25 DIAGNOSIS — R79.1 SUPRATHERAPEUTIC INR: Primary | ICD-10-CM

## 2019-11-25 DIAGNOSIS — Z79.01 CHRONIC ANTICOAGULATION: ICD-10-CM

## 2019-11-25 LAB
A/G RATIO: 1.2 (ref 1.1–2.2)
ALBUMIN SERPL-MCNC: 3.6 G/DL (ref 3.4–5)
ALP BLD-CCNC: 71 U/L (ref 40–129)
ALT SERPL-CCNC: 9 U/L (ref 10–40)
ANION GAP SERPL CALCULATED.3IONS-SCNC: 10 MMOL/L (ref 3–16)
AST SERPL-CCNC: 14 U/L (ref 15–37)
BASOPHILS ABSOLUTE: 0 K/UL (ref 0–0.2)
BASOPHILS RELATIVE PERCENT: 0.5 %
BILIRUB SERPL-MCNC: 0.5 MG/DL (ref 0–1)
BUN BLDV-MCNC: 23 MG/DL (ref 7–20)
CALCIUM SERPL-MCNC: 9 MG/DL (ref 8.3–10.6)
CHLORIDE BLD-SCNC: 98 MMOL/L (ref 99–110)
CO2: 27 MMOL/L (ref 21–32)
CREAT SERPL-MCNC: 0.9 MG/DL (ref 0.6–1.2)
EOSINOPHILS ABSOLUTE: 0 K/UL (ref 0–0.6)
EOSINOPHILS RELATIVE PERCENT: 0.3 %
GFR AFRICAN AMERICAN: >60
GFR NON-AFRICAN AMERICAN: >60
GLOBULIN: 2.9 G/DL
GLUCOSE BLD-MCNC: 111 MG/DL (ref 70–99)
HCT VFR BLD CALC: 36.6 % (ref 36–48)
HEMOGLOBIN: 12.3 G/DL (ref 12–16)
INR BLD: 5.4 (ref 0.86–1.14)
LYMPHOCYTES ABSOLUTE: 1 K/UL (ref 1–5.1)
LYMPHOCYTES RELATIVE PERCENT: 12.6 %
MCH RBC QN AUTO: 31.8 PG (ref 26–34)
MCHC RBC AUTO-ENTMCNC: 33.6 G/DL (ref 31–36)
MCV RBC AUTO: 94.4 FL (ref 80–100)
MONOCYTES ABSOLUTE: 0.6 K/UL (ref 0–1.3)
MONOCYTES RELATIVE PERCENT: 7.3 %
NEUTROPHILS ABSOLUTE: 6.3 K/UL (ref 1.7–7.7)
NEUTROPHILS RELATIVE PERCENT: 79.3 %
PDW BLD-RTO: 13.9 % (ref 12.4–15.4)
PLATELET # BLD: 363 K/UL (ref 135–450)
PMV BLD AUTO: 7.6 FL (ref 5–10.5)
POTASSIUM REFLEX MAGNESIUM: 4.5 MMOL/L (ref 3.5–5.1)
PROTHROMBIN TIME: 63.7 SEC (ref 10–13.2)
RBC # BLD: 3.88 M/UL (ref 4–5.2)
SODIUM BLD-SCNC: 135 MMOL/L (ref 136–145)
TOTAL PROTEIN: 6.5 G/DL (ref 6.4–8.2)
WBC # BLD: 8 K/UL (ref 4–11)

## 2019-11-25 PROCEDURE — 6370000000 HC RX 637 (ALT 250 FOR IP): Performed by: NURSE PRACTITIONER

## 2019-11-25 PROCEDURE — 85025 COMPLETE CBC W/AUTO DIFF WBC: CPT

## 2019-11-25 PROCEDURE — 99283 EMERGENCY DEPT VISIT LOW MDM: CPT

## 2019-11-25 PROCEDURE — 85610 PROTHROMBIN TIME: CPT

## 2019-11-25 PROCEDURE — 80053 COMPREHEN METABOLIC PANEL: CPT

## 2019-11-25 RX ORDER — PHYTONADIONE 5 MG/1
10 TABLET ORAL ONCE
Status: COMPLETED | OUTPATIENT
Start: 2019-11-25 | End: 2019-11-25

## 2019-11-25 RX ORDER — SULFAMETHOXAZOLE AND TRIMETHOPRIM 400; 80 MG/1; MG/1
1 TABLET ORAL 2 TIMES DAILY
COMMUNITY

## 2019-11-25 RX ORDER — DOXYCYCLINE HYCLATE 50 MG/1
50 CAPSULE ORAL 2 TIMES DAILY
COMMUNITY

## 2019-11-25 RX ADMIN — PHYTONADIONE 10 MG: 5 TABLET ORAL at 20:46

## 2020-01-30 ENCOUNTER — OFFICE VISIT (OUTPATIENT)
Dept: ORTHOPEDIC SURGERY | Age: 80
End: 2020-01-30
Payer: MEDICARE

## 2020-01-30 VITALS — HEIGHT: 64 IN | WEIGHT: 259.92 LBS | BODY MASS INDEX: 44.38 KG/M2

## 2020-01-30 PROCEDURE — G8484 FLU IMMUNIZE NO ADMIN: HCPCS | Performed by: ORTHOPAEDIC SURGERY

## 2020-01-30 PROCEDURE — 20610 DRAIN/INJ JOINT/BURSA W/O US: CPT | Performed by: ORTHOPAEDIC SURGERY

## 2020-01-30 PROCEDURE — G8427 DOCREV CUR MEDS BY ELIG CLIN: HCPCS | Performed by: ORTHOPAEDIC SURGERY

## 2020-01-30 PROCEDURE — G8417 CALC BMI ABV UP PARAM F/U: HCPCS | Performed by: ORTHOPAEDIC SURGERY

## 2020-01-30 PROCEDURE — 99213 OFFICE O/P EST LOW 20 MIN: CPT | Performed by: ORTHOPAEDIC SURGERY

## 2020-01-30 PROCEDURE — 1090F PRES/ABSN URINE INCON ASSESS: CPT | Performed by: ORTHOPAEDIC SURGERY

## 2020-01-30 RX ORDER — METHYLPREDNISOLONE ACETATE 40 MG/ML
80 INJECTION, SUSPENSION INTRA-ARTICULAR; INTRALESIONAL; INTRAMUSCULAR; SOFT TISSUE ONCE
Status: COMPLETED | OUTPATIENT
Start: 2020-01-30 | End: 2020-01-30

## 2020-01-30 RX ADMIN — METHYLPREDNISOLONE ACETATE 80 MG: 40 INJECTION, SUSPENSION INTRA-ARTICULAR; INTRALESIONAL; INTRAMUSCULAR; SOFT TISSUE at 15:49

## 2020-01-30 NOTE — LETTER
[facility]  [address] [address]  Phone: [phone]  Fax: [phone]    CHRISTOS MONSALVE MD        January 30, 2020     [place] [address]    Patient: Yolis Gaytan  MR Number: [id number]  YOB: 1940  Date of Visit: 1/30/2020    Dear Dr. Glenn Cr [address]:    Thank you for the request for consultation for Robert Culp to me for the evaluation of left shoulder. Below are the relevant portions of my assessment and plan of care. If you have questions, please do not hesitate to call me. I look forward to following Lila Lainez along with you.     Sincerely,        Alexey Cardenas MD

## 2020-01-30 NOTE — PROGRESS NOTES
Hypertension     IBS (irritable bowel syndrome) 2010       Past Surgical History:  Past Surgical History:   Procedure Laterality Date    COLONOSCOPY      1994 OCT 12,2015 (diverticulosis)    HYSTERECTOMY      JOINT REPLACEMENT      PATRICIO TKR    KNEE SURGERY         Allergies: Allergies   Allergen Reactions    Adhesive Tape Other (See Comments)     Severe skin irritation, blisters    Erythromycin Itching    Pcn [Penicillins] Rash       Medications:  Current Outpatient Medications   Medication Sig Dispense Refill    doxycycline (VIBRAMYCIN) 50 MG capsule Take 50 mg by mouth 2 times daily      sulfamethoxazole-trimethoprim (BACTRIM;SEPTRA) 400-80 MG per tablet Take 1 tablet by mouth 2 times daily      gabapentin (NEURONTIN) 100 MG capsule Take 100 mg by mouth 2 times daily.  Fluticasone Propionate (FLONASE NA) 1 spray by Nasal route 2 times daily      lisinopril (PRINIVIL;ZESTRIL) 10 MG tablet Take 10 mg by mouth daily      furosemide (LASIX) 80 MG tablet Take 80 mg by mouth 2 times daily.  citalopram (CELEXA) 40 MG tablet Take 40 mg by mouth daily       POTASSIUM CHLORIDE Take 40 mEq by mouth 2 times daily       fexofenadine (ALLEGRA) 180 MG tablet Take 180 mg by mouth daily as needed       alprazolam (XANAX) 0.25 MG tablet Take 0.25 mg by mouth 3 times daily as needed.  albuterol (PROAIR HFA) 108 (90 BASE) MCG/ACT inhaler Inhale 2 puffs into the lungs every 6 hours as needed.  fluticasone (FLOVENT HFA) 110 MCG/ACT inhaler Inhale 1 puff into the lungs daily as needed.  warfarin (COUMADIN) 5 MG tablet Take 3 mg by mouth every evening       atenolol (TENORMIN) 25 MG tablet Take 50 mg by mouth daily        No current facility-administered medications for this visit. Review of Systems:  Eloisa Singleton's review of systems has been performed by intake and observation. All past and current ROS forms have been scanned into the medical record.  She has been instructed to I discussed the diagnosis and treatment options with Jt Chaudhary today. They we plan to proceed with an injection of her glenohumeral joint. That was felt to be the best option to control her shoulder discomfort. She is a nonsurgical candidate. The risks and benefits of an injection were discussed with the patient. The patient had full opportunity to ask questions and all were answered. The patient then provided verbal informed consent. The skin was then prepped with betadine solution and alcohol. Under aseptic conditions, the  left shoulder, glenohumeral joint, was injected with 2cc of 1% xylocaine, then a mixture of 3cc of 0.25% marcaine and 2cc (80 mg) of Depomedrol. There were no immediate complications following the injection. The patient was advised of the possibility of injection site reaction and instructed to apply ice to the area. Today we also wrote a prescription for home physical therapy. She is essentially homebound. She will have a prescription for physical therapy twice a week at home to assist with her shoulder as well as her mobility and ambulatory status with the use of a walker. She will follow-up with us as needed      Non-steroidal anti-inflammatories medications (NSAIDs) can be used to assist with pain control and to reduce inflammatory changes. These medications may be over-the-counter or prescribed. We discussed taking the NSAID properly and the precautions. The patient understands that this medication may potentially interfere with other medications. Patient was also instructed to immediately discontinue the medication is there is any possible complication. Jt Chaudhary was instructed to call the office if her symptoms worsen or if new symptoms appear prior to the next scheduled visit.  She is specifically instructed to contact the office between now and schedule appointment if she has concerns related to her condition or if she needs assistance in scheduling any above tests. She is welcome to call for an appointment sooner if she has any additional concerns or questions. This dictation was performed with a verbal recognition program (DRAGON) and it was checked for errors. It is possible that there are still dictated errors within this office note. If so, please bring any errors to my attention for an addendum. All efforts were made to ensure that this office note is accurate.

## 2020-06-08 ENCOUNTER — OFFICE VISIT (OUTPATIENT)
Dept: ORTHOPEDIC SURGERY | Age: 80
End: 2020-06-08
Payer: MEDICARE

## 2020-06-08 VITALS — HEIGHT: 64 IN | BODY MASS INDEX: 44.22 KG/M2 | WEIGHT: 259 LBS

## 2020-06-08 PROCEDURE — 4040F PNEUMOC VAC/ADMIN/RCVD: CPT | Performed by: ORTHOPAEDIC SURGERY

## 2020-06-08 PROCEDURE — 20610 DRAIN/INJ JOINT/BURSA W/O US: CPT | Performed by: ORTHOPAEDIC SURGERY

## 2020-06-08 PROCEDURE — G8427 DOCREV CUR MEDS BY ELIG CLIN: HCPCS | Performed by: ORTHOPAEDIC SURGERY

## 2020-06-08 PROCEDURE — 1090F PRES/ABSN URINE INCON ASSESS: CPT | Performed by: ORTHOPAEDIC SURGERY

## 2020-06-08 PROCEDURE — 1123F ACP DISCUSS/DSCN MKR DOCD: CPT | Performed by: ORTHOPAEDIC SURGERY

## 2020-06-08 PROCEDURE — G8400 PT W/DXA NO RESULTS DOC: HCPCS | Performed by: ORTHOPAEDIC SURGERY

## 2020-06-08 PROCEDURE — 1036F TOBACCO NON-USER: CPT | Performed by: ORTHOPAEDIC SURGERY

## 2020-06-08 PROCEDURE — G8417 CALC BMI ABV UP PARAM F/U: HCPCS | Performed by: ORTHOPAEDIC SURGERY

## 2020-06-08 PROCEDURE — 99213 OFFICE O/P EST LOW 20 MIN: CPT | Performed by: ORTHOPAEDIC SURGERY

## 2020-06-08 RX ORDER — BUPIVACAINE HYDROCHLORIDE 2.5 MG/ML
60 INJECTION, SOLUTION INFILTRATION; PERINEURAL ONCE
Status: COMPLETED | OUTPATIENT
Start: 2020-06-08 | End: 2020-06-08

## 2020-06-08 RX ORDER — LIDOCAINE HYDROCHLORIDE 10 MG/ML
20 INJECTION, SOLUTION INFILTRATION; PERINEURAL ONCE
Status: COMPLETED | OUTPATIENT
Start: 2020-06-08 | End: 2020-06-08

## 2020-06-08 RX ORDER — METHYLPREDNISOLONE ACETATE 40 MG/ML
80 INJECTION, SUSPENSION INTRA-ARTICULAR; INTRALESIONAL; INTRAMUSCULAR; SOFT TISSUE ONCE
Status: COMPLETED | OUTPATIENT
Start: 2020-06-08 | End: 2020-06-08

## 2020-06-08 RX ADMIN — LIDOCAINE HYDROCHLORIDE 20 ML: 10 INJECTION, SOLUTION INFILTRATION; PERINEURAL at 13:27

## 2020-06-08 RX ADMIN — BUPIVACAINE HYDROCHLORIDE 150 MG: 2.5 INJECTION, SOLUTION INFILTRATION; PERINEURAL at 13:17

## 2020-06-08 RX ADMIN — METHYLPREDNISOLONE ACETATE 80 MG: 40 INJECTION, SUSPENSION INTRA-ARTICULAR; INTRALESIONAL; INTRAMUSCULAR; SOFT TISSUE at 13:27

## 2020-06-08 NOTE — PROGRESS NOTES
MD Rolando Jeffers, [address] [facility]         Orthopaedic Surgery and Sports Medicine    Patient Name: Magui Leos  YOB: 1940  Patient's PCP is Latricia Barrow MD    SUBJECTIVE  Chief Complaint:  Shoulder Pain (left    )      History of Present Illness:  Magui Leos is a right handed 67 y.o. female here regarding left shoulder pain. She recently has had an increase in her falls. She had a very severe fall November 11,2019 where she landed forward and hit her face and her chest and her shoulder. Since that time she has been ambulating with the assistance of a walker and that has actually aggravated her shoulder even more. We do feel that the walker is making her most likely more stable. Although she does have moderately poor gait with a walker. The pain has a diffuse location around the shoulder and is not well localized. She describes the symptoms as aching and sharp. She rates pain at 7/10. Symptoms improve with nothing. The symptoms are worse with elevation, activity, lifting and work. The shoulder has not dislocated/subluxed and/or felt unstable. There is no numbness or tingling in hand/fingers. Sleeping habits related to chief complaint: fair      The patient has not had PT. The patient has had an injection. The patient has not taken NSAIDs. Does take a anticoagulant. She does take Tylenol on a as needed basis however her primary care physician suggested that she start taking that more frequently  The patient is not working. At her last visit we injected her shoulder with a cortisone injection and she reports that helped a great deal until approximately 1 month ago. She is interested in another injection today.       Pain Assessment:  Pain Assessment  Location of Pain: Shoulder  Location Modifiers: Left  Severity of Pain: 7  Quality of Pain: Dull, MCG/ACT inhaler Inhale 1 puff into the lungs daily as needed.  warfarin (COUMADIN) 5 MG tablet Take 3 mg by mouth every evening       atenolol (TENORMIN) 25 MG tablet Take 50 mg by mouth daily        No current facility-administered medications for this visit. Review of Systems:  Ely Singleton's review of systems has been performed by intake and observation. All past and current ROS forms have been scanned into the medical record. She has been instructed to contact her primary care provider regarding ROS issues if not already being addressed at this time. There are no recent changes. The most recent ROS was scanned into media on 1/30/2020      OBJECTIVE  PHYSICAL EXAM  Vital Signs: There were no vitals filed for this visit. Body mass index is 44.46 kg/m². General Exam:   Constitutional: Patient is adequately groomed with no evidence of malnutrition  DTRs: Deep tendon reflexes are intact  Mental Status: The patient is oriented to time, place and person. The patient's mood and affect are appropriate. Lymphatic: The lymphatic examination bilaterally reveals all areas to be without enlargement or induration. Vascular: Examination reveals no swelling or calf tenderness. Peripheral pulses are palpable and 2+. Neurological: The patient has good coordination. There is no weakness or sensory deficit. Left Shoulder Examination  Inspection:    Visual deformity noted: No    mild swelling noted. No erythema or ecchymosis. Skin is intact with no cellulitis, rashes, ulcerations, lymphedema     or cutaneous lesions noted. Palpation:  moderate tenderness to palpation on the anterior, posterior region. Range of Motion: Her active range of motion is limited. She has weakness with attempting to elevate her arm in forward flexion or abduction. Also has restricted reduced internal and external rotation with crepitus.     Special Tests:  Obriens test: positive       Apprehension test: Not solution and alcohol. Under aseptic conditions, the  left shoulder, glenohumeral joint, was injected with 2cc of 1% xylocaine, then a mixture of 3cc of 0.25% marcaine and 2cc (80 mg) of Depomedrol. There were no immediate complications following the injection. The patient was advised of the possibility of injection site reaction and instructed to apply ice to the area. She will follow-up with us as needed      Non-steroidal anti-inflammatories medications (NSAIDs) can be used to assist with pain control and to reduce inflammatory changes. These medications may be over-the-counter or prescribed. We discussed taking the NSAID properly and the precautions. The patient understands that this medication may potentially interfere with other medications. Patient was also instructed to immediately discontinue the medication is there is any possible complication. Major Virk was instructed to call the office if her symptoms worsen or if new symptoms appear prior to the next scheduled visit. She is specifically instructed to contact the office between now and schedule appointment if she has concerns related to her condition or if she needs assistance in scheduling any above tests. She is welcome to call for an appointment sooner if she has any additional concerns or questions. Fifi Moctezuma PA-C, scribing for and in the presence of Dr. Jenny Carmona   6/8/2020 1:25 PM    I, Dr. Juan F De, personally performed the services described in this documentation as scribed by Jeff Choe. JOHN Toledo in my presence, and it is both accurate and complete. Juan F De MD        This dictation was performed with a verbal recognition program [facility]) and it was checked for errors. It is possible that there are still dictated errors within this office note. If so, please bring any errors to my attention for an addendum. All efforts were made to ensure that this office note is accurate.

## 2021-07-31 ENCOUNTER — APPOINTMENT (OUTPATIENT)
Dept: CT IMAGING | Age: 81
End: 2021-07-31
Payer: MEDICARE

## 2021-07-31 ENCOUNTER — HOSPITAL ENCOUNTER (EMERGENCY)
Age: 81
Discharge: HOME | End: 2021-07-31
Payer: MEDICARE

## 2021-07-31 VITALS
WEIGHT: 243 LBS | TEMPERATURE: 98 F | HEART RATE: 61 BPM | RESPIRATION RATE: 27 BRPM | SYSTOLIC BLOOD PRESSURE: 132 MMHG | OXYGEN SATURATION: 96 % | BODY MASS INDEX: 40.48 KG/M2 | HEIGHT: 65 IN | DIASTOLIC BLOOD PRESSURE: 60 MMHG

## 2021-07-31 DIAGNOSIS — R11.0 NAUSEA: ICD-10-CM

## 2021-07-31 DIAGNOSIS — N39.0 URINARY TRACT INFECTION WITHOUT HEMATURIA, SITE UNSPECIFIED: ICD-10-CM

## 2021-07-31 DIAGNOSIS — R14.0 ABDOMINAL BLOATING: Primary | ICD-10-CM

## 2021-07-31 LAB
A/G RATIO: 1.1 (ref 1.1–2.2)
ALBUMIN SERPL-MCNC: 3.5 G/DL (ref 3.4–5)
ALP BLD-CCNC: 80 U/L (ref 40–129)
ALT SERPL-CCNC: 11 U/L (ref 10–40)
ANION GAP SERPL CALCULATED.3IONS-SCNC: 11 MMOL/L (ref 3–16)
AST SERPL-CCNC: 20 U/L (ref 15–37)
BACTERIA: ABNORMAL /HPF
BASOPHILS ABSOLUTE: 0.1 K/UL (ref 0–0.2)
BASOPHILS RELATIVE PERCENT: 1.1 %
BILIRUB SERPL-MCNC: 1.6 MG/DL (ref 0–1)
BILIRUBIN URINE: NEGATIVE
BLOOD, URINE: ABNORMAL
BUN BLDV-MCNC: 15 MG/DL (ref 7–20)
CALCIUM SERPL-MCNC: 8.9 MG/DL (ref 8.3–10.6)
CHLORIDE BLD-SCNC: 100 MMOL/L (ref 99–110)
CLARITY: ABNORMAL
CO2: 24 MMOL/L (ref 21–32)
COLOR: YELLOW
CREAT SERPL-MCNC: 0.8 MG/DL (ref 0.6–1.2)
EOSINOPHILS ABSOLUTE: 0.1 K/UL (ref 0–0.6)
EOSINOPHILS RELATIVE PERCENT: 1.6 %
EPITHELIAL CELLS, UA: ABNORMAL /HPF (ref 0–5)
GFR AFRICAN AMERICAN: >60
GFR NON-AFRICAN AMERICAN: >60
GLOBULIN: 3.1 G/DL
GLUCOSE BLD-MCNC: 96 MG/DL (ref 70–99)
GLUCOSE URINE: NEGATIVE MG/DL
HCT VFR BLD CALC: 42.4 % (ref 36–48)
HEMOGLOBIN: 14.6 G/DL (ref 12–16)
INR BLD: 1.7 (ref 0.88–1.12)
KETONES, URINE: NEGATIVE MG/DL
LEUKOCYTE ESTERASE, URINE: ABNORMAL
LIPASE: 12 U/L (ref 13–60)
LYMPHOCYTES ABSOLUTE: 1.8 K/UL (ref 1–5.1)
LYMPHOCYTES RELATIVE PERCENT: 22.4 %
MCH RBC QN AUTO: 32.6 PG (ref 26–34)
MCHC RBC AUTO-ENTMCNC: 34.5 G/DL (ref 31–36)
MCV RBC AUTO: 94.5 FL (ref 80–100)
MICROSCOPIC EXAMINATION: YES
MONOCYTES ABSOLUTE: 0.8 K/UL (ref 0–1.3)
MONOCYTES RELATIVE PERCENT: 9.9 %
NEUTROPHILS ABSOLUTE: 5.3 K/UL (ref 1.7–7.7)
NEUTROPHILS RELATIVE PERCENT: 65 %
NITRITE, URINE: NEGATIVE
PDW BLD-RTO: 13.6 % (ref 12.4–15.4)
PH UA: 6.5 (ref 5–8)
PLATELET # BLD: 234 K/UL (ref 135–450)
PMV BLD AUTO: 8.5 FL (ref 5–10.5)
POTASSIUM SERPL-SCNC: 4.2 MMOL/L (ref 3.5–5.1)
PROTEIN UA: NEGATIVE MG/DL
PROTHROMBIN TIME: 19.6 SEC (ref 9.9–12.7)
RBC # BLD: 4.49 M/UL (ref 4–5.2)
RBC UA: ABNORMAL /HPF (ref 0–4)
SODIUM BLD-SCNC: 135 MMOL/L (ref 136–145)
SPECIFIC GRAVITY UA: 1.01 (ref 1–1.03)
TOTAL PROTEIN: 6.6 G/DL (ref 6.4–8.2)
URINE TYPE: ABNORMAL
UROBILINOGEN, URINE: 2 E.U./DL
WBC # BLD: 8.2 K/UL (ref 4–11)
WBC UA: ABNORMAL /HPF (ref 0–5)

## 2021-07-31 PROCEDURE — 6360000002 HC RX W HCPCS: Performed by: PHYSICIAN ASSISTANT

## 2021-07-31 PROCEDURE — 81001 URINALYSIS AUTO W/SCOPE: CPT

## 2021-07-31 PROCEDURE — 6360000004 HC RX CONTRAST MEDICATION: Performed by: PHYSICIAN ASSISTANT

## 2021-07-31 PROCEDURE — 85610 PROTHROMBIN TIME: CPT

## 2021-07-31 PROCEDURE — 80053 COMPREHEN METABOLIC PANEL: CPT

## 2021-07-31 PROCEDURE — 96375 TX/PRO/DX INJ NEW DRUG ADDON: CPT

## 2021-07-31 PROCEDURE — 2580000003 HC RX 258: Performed by: PHYSICIAN ASSISTANT

## 2021-07-31 PROCEDURE — 85025 COMPLETE CBC W/AUTO DIFF WBC: CPT

## 2021-07-31 PROCEDURE — 96365 THER/PROPH/DIAG IV INF INIT: CPT

## 2021-07-31 PROCEDURE — 74177 CT ABD & PELVIS W/CONTRAST: CPT

## 2021-07-31 PROCEDURE — 83690 ASSAY OF LIPASE: CPT

## 2021-07-31 PROCEDURE — 87086 URINE CULTURE/COLONY COUNT: CPT

## 2021-07-31 PROCEDURE — 99284 EMERGENCY DEPT VISIT MOD MDM: CPT

## 2021-07-31 RX ORDER — ONDANSETRON 4 MG/1
4 TABLET, ORALLY DISINTEGRATING ORAL EVERY 8 HOURS PRN
Qty: 12 TABLET | Refills: 0 | Status: SHIPPED | OUTPATIENT
Start: 2021-07-31

## 2021-07-31 RX ORDER — CEFDINIR 300 MG/1
300 CAPSULE ORAL 2 TIMES DAILY
Qty: 14 CAPSULE | Refills: 0 | Status: SHIPPED | OUTPATIENT
Start: 2021-07-31 | End: 2021-08-07

## 2021-07-31 RX ORDER — DIPHENHYDRAMINE HYDROCHLORIDE 50 MG/ML
12.5 INJECTION INTRAMUSCULAR; INTRAVENOUS ONCE
Status: COMPLETED | OUTPATIENT
Start: 2021-07-31 | End: 2021-07-31

## 2021-07-31 RX ORDER — MORPHINE SULFATE 4 MG/ML
4 INJECTION, SOLUTION INTRAMUSCULAR; INTRAVENOUS ONCE
Status: COMPLETED | OUTPATIENT
Start: 2021-07-31 | End: 2021-07-31

## 2021-07-31 RX ORDER — ONDANSETRON 2 MG/ML
4 INJECTION INTRAMUSCULAR; INTRAVENOUS ONCE
Status: COMPLETED | OUTPATIENT
Start: 2021-07-31 | End: 2021-07-31

## 2021-07-31 RX ORDER — ONDANSETRON 4 MG/1
4 TABLET, ORALLY DISINTEGRATING ORAL EVERY 8 HOURS PRN
Qty: 12 TABLET | Refills: 0 | Status: SHIPPED | OUTPATIENT
Start: 2021-07-31 | End: 2021-07-31 | Stop reason: SDUPTHER

## 2021-07-31 RX ORDER — CEFDINIR 300 MG/1
300 CAPSULE ORAL 2 TIMES DAILY
Qty: 14 CAPSULE | Refills: 0 | Status: SHIPPED | OUTPATIENT
Start: 2021-07-31 | End: 2021-07-31 | Stop reason: SDUPTHER

## 2021-07-31 RX ADMIN — IOPAMIDOL 75 ML: 755 INJECTION, SOLUTION INTRAVENOUS at 18:06

## 2021-07-31 RX ADMIN — ONDANSETRON 4 MG: 2 INJECTION INTRAMUSCULAR; INTRAVENOUS at 18:23

## 2021-07-31 RX ADMIN — DIPHENHYDRAMINE HYDROCHLORIDE 12.5 MG: 50 INJECTION, SOLUTION INTRAMUSCULAR; INTRAVENOUS at 18:44

## 2021-07-31 RX ADMIN — MORPHINE SULFATE 4 MG: 4 INJECTION, SOLUTION INTRAMUSCULAR; INTRAVENOUS at 18:23

## 2021-07-31 RX ADMIN — CEFTRIAXONE SODIUM 1000 MG: 1 INJECTION, POWDER, FOR SOLUTION INTRAMUSCULAR; INTRAVENOUS at 18:48

## 2021-07-31 ASSESSMENT — ENCOUNTER SYMPTOMS
DIARRHEA: 0
COUGH: 0
COLOR CHANGE: 0
SHORTNESS OF BREATH: 0
VOMITING: 0
EYES NEGATIVE: 1
BACK PAIN: 0
ABDOMINAL PAIN: 1
NAUSEA: 1
ABDOMINAL DISTENTION: 1
CONSTIPATION: 1

## 2021-07-31 ASSESSMENT — PAIN SCALES - GENERAL: PAINLEVEL_OUTOF10: 5

## 2021-07-31 NOTE — ED PROVIDER NOTES
[facility]  ED  EMERGENCY DEPARTMENT ENCOUNTER        Pt Name: David Baig  MRN: [id number]  [place] 1940  Date of evaluation: 7/31/2021  Provider: Isra Mario PA-C  PCP: Syl Zhong MD  ED Attending: Venu Valdivia      This patient was not seen by the attending provider     History provided by the patient    CHIEF COMPLAINT:     Chief Complaint   Patient presents with    Nausea    Bloated       HISTORY OF PRESENT ILLNESS:      David Bagi is a 79 y.o. female who arrives to the ED by private vehicle. Patient states since yesterday she has had some abdominal discomfort. She describes feeling full or bloated as if she just ate a large meal.  She has been nauseous but denies vomiting. She reports some mild constipation but ate some prunes yesterday and was able to move her bowels. She has a history of IBS, diverticulitis, pancreatitis. She is wonders if 1 of these things could be flaring up, causing her pain. At this time her pain seems to be more in the mid, epigastrium. She cannot identify any exacerbating or alleviating factors to the symptoms. She has remotely undergone hysterectomy. She denies other abdominal or pelvic surgeries. Patient incidentally is on Coumadin secondary to atrial fibrillation. She tells me her INR has been up and down over the last few weeks. Nursing Notes were reviewed     REVIEW OF SYSTEMS:     Review of Systems   Constitutional: Negative for chills and fever. HENT: Negative. Eyes: Negative. Respiratory: Negative for cough and shortness of breath. Cardiovascular: Negative for chest pain. Gastrointestinal: Positive for abdominal distention, abdominal pain, constipation and nausea. Negative for diarrhea and vomiting. Genitourinary: Negative for dysuria. Musculoskeletal: Negative for back pain and neck pain. Skin: Negative for color change. Neurological: Positive for light-headedness. Negative for dizziness and weakness. Family History   Problem Relation Age of Onset    Heart Disease Mother     Other Father     Other Sister     Cancer Brother           SOCIAL HISTORY:       Social History     Socioeconomic History    Marital status:      Spouse name: None    Number of children: None    Years of education: None    Highest education level: None   Occupational History    None   Tobacco Use    Smoking status: Former Smoker     Packs/day: 0.25     Years: 2.00     Pack years: 0.50     Types: Cigarettes     Quit date: 1975     Years since quittin.1    Smokeless tobacco: Never Used   Vaping Use    Vaping Use: Never used   Substance and Sexual Activity    Alcohol use: No    Drug use: No    Sexual activity: Not Currently   Other Topics Concern    None   Social History Narrative    None     Social Determinants of Health     Financial Resource Strain:     Difficulty of Paying Living Expenses:    Food Insecurity:     Worried About Running Out of Food in the Last Year:     Ran Out of Food in the Last Year:    Transportation Needs:     Lack of Transportation (Medical):      Lack of Transportation (Non-Medical):    Physical Activity:     Days of Exercise per Week:     Minutes of Exercise per Session:    Stress:     Feeling of Stress :    Social Connections:     Frequency of Communication with Friends and Family:     Frequency of Social Gatherings with Friends and Family:     Attends Shinto Services:     Active Member of Clubs or Organizations:     Attends Club or Organization Meetings:     Marital Status:    Intimate Partner Violence:     Fear of Current or Ex-Partner:     Emotionally Abused:     Physically Abused:     Sexually Abused:        SCREENINGS:    [place] Coma Scale  Eye Opening: Spontaneous  Best Verbal Response: Oriented  Best Motor Response: Obeys commands  Moni Coma Scale Score: 15        PHYSICAL EXAM:       ED Triage Vitals [21 1641]   BP Temp Temp Source Pulse Resp (L) 136 - 145 mmol/L    Potassium 4.2 3.5 - 5.1 mmol/L    Chloride 100 99 - 110 mmol/L    CO2 24 21 - 32 mmol/L    Anion Gap 11 3 - 16    Glucose 96 70 - 99 mg/dL    BUN 15 7 - 20 mg/dL    CREATININE 0.8 0.6 - 1.2 mg/dL    GFR Non-African American >60 >60    GFR African American >60 >60    Calcium 8.9 8.3 - 10.6 mg/dL    Total Protein 6.6 6.4 - 8.2 g/dL    Albumin 3.5 3.4 - 5.0 g/dL    Albumin/Globulin Ratio 1.1 1.1 - 2.2    Total Bilirubin 1.6 (H) 0.0 - 1.0 mg/dL    Alkaline Phosphatase 80 40 - 129 U/L    ALT 11 10 - 40 U/L    AST 20 15 - 37 U/L    Globulin 3.1 g/dL   Urinalysis, reflex to microscopic   Result Value Ref Range    Color, UA Yellow Straw/Yellow    Clarity, UA CLOUDY (A) Clear    Glucose, Ur Negative Negative mg/dL    Bilirubin Urine Negative Negative    Ketones, Urine Negative Negative mg/dL    Specific Gravity, UA 1.010 1.005 - 1.030    Blood, Urine TRACE-LYSED (A) Negative    pH, UA 6.5 5.0 - 8.0    Protein, UA Negative Negative mg/dL    Urobilinogen, Urine 2.0 (A) <2.0 E.U./dL    Nitrite, Urine Negative Negative    Leukocyte Esterase, Urine SMALL (A) Negative    Microscopic Examination YES     Urine Type NotGiven    Lipase   Result Value Ref Range    Lipase 12.0 (L) 13.0 - 60.0 U/L   Protime-INR   Result Value Ref Range    Protime 19.6 (H) 9.9 - 12.7 sec    INR 1.70 (H) 0.88 - 1.12   Microscopic Urinalysis   Result Value Ref Range    WBC, UA 21-50 (A) 0 - 5 /HPF    RBC, UA 5-10 (A) 0 - 4 /HPF    Epithelial Cells, UA 21-50 (A) 0 - 5 /HPF    Bacteria, UA 3+ (A) None Seen /HPF         RADIOLOGY:  All x-ray studies areviewed/reviewed by me.   Formal interpretations per the radiologist are as follows:    CT ABDOMEN PELVIS W IV CONTRAST Additional Contrast? None    Result Date: 7/31/2021  EXAMINATION: CT OF THE ABDOMEN AND PELVIS WITH CONTRAST 7/31/2021 5:43 pm TECHNIQUE: CT of the abdomen and pelvis was performed with the administration of intravenous contrast. Multiplanar reformatted images are provided for review. Dose modulation, iterative reconstruction, and/or weight based adjustment of the mA/kV was utilized to reduce the radiation dose to as low as reasonably achievable. COMPARISON: None. HISTORY: ORDERING SYSTEM PROVIDED HISTORY: abd pain, bloating TECHNOLOGIST PROVIDED HISTORY: Reason for exam:->abd pain, bloating Additional Contrast?->None Decision Support Exception - unselect if not a suspected or confirmed emergency medical condition->Emergency Medical Condition (MA) Reason for Exam: bloating since yesterday-constipation-dizziness-upper abd discomfort Acuity: Acute Type of Exam: Initial Relevant Medical/Surgical History: total hyst FINDINGS: Lower Chest: There is no consolidation or effusion. Organs: The liver, pancreas, spleen, kidneys and adrenals are unremarkable. GI/Bowel: There is no bowel dilatation, wall thickening or obstruction. The appendix is normal.  Diverticular changes are scattered throughout the entirety of the colon. Pelvis: The uterus is surgically absent. The bladder is decompressed and thus not evaluated. Peritoneum/Retroperitoneum: There is no free air, free fluid or intraperitoneal inflammatory change. There is no adenopathy. Bones/Soft Tissues: There is no acute fracture or aggressive osseous lesion. Diverticulosis.        PROCEDURES:   N/A    CRITICAL CARE TIME:       None    CONSULTS:  None      EMERGENCY DEPARTMENT COURSE and DIFFERENTIAL DIAGNOSIS/MDM:   Vitals:    Vitals:    07/31/21 1641 07/31/21 1815 07/31/21 1829 07/31/21 1845   BP: (!) 150/78 (!) 160/66 (!) 142/70 136/73   Pulse: 62 65 72 66   Resp: 15 19 22 24   Temp: 98 °F (36.7 °C)      TempSrc: Oral      SpO2: 97% 100% 99% 99%   Weight: 243 lb (110.2 kg)      Height: 5' 5\" (1.651 m)          Patient was given the following medications:  Medications   cefTRIAXone (ROCEPHIN) 1000 mg IVPB in 50 mL D5W minibag (1,000 mg Intravenous New Bag 7/31/21 1848)   iopamidol (ISOVUE-370) 76 % injection 75 mL (75 mLs Intravenous Given 7/31/21 1806)   ondansetron (ZOFRAN) injection 4 mg (4 mg Intravenous Given 7/31/21 1823)   morphine (PF) injection 4 mg (4 mg Intravenous Given 7/31/21 1823)   diphenhydrAMINE (BENADRYL) injection 12.5 mg (12.5 mg Intravenous Given 7/31/21 1844)         I have evaluated this patient in the ED. Old records were reviewed. Patient arrives to the ED describing 2 days of abdominal discomfort, bloating and nausea. She is concerned about a flareup of possibly pancreatitis, diverticulitis or IBS. She has not been febrile or vomiting but feels that something is not right. Given her age, complaints and history, work-up is initiated. CBC reveals normal white count of 8.2 with H&H 14.6 and 42.4  CMP unremarkable  Lipase 12  PT 19.6 and INR 1.7  Urinalysis shows cloudy urine with trace blood, small leukocytes, 21-50 WBCs, 5-10 RBCs, 3+ bacteria but also 21-50 epithelial cells. Urine culture will be sent. CT abdomen and pelvis with IV contrast shows diverticulosis. No other abnormalities noted. On reassessing patient she still has some bloating and also reports a headache. She is ordered morphine 4 mg IV with Zofran 4 mg IV. Shortly after receiving this her left arm with IV is in place became a little itchy and therefore she is ordered Benadryl 12.5 mg IV. Her work-up is unremarkable with the exception of what appears to be urinary tract infection. I reviewed old cultures. Based on this patient is ordered a dose of Rocephin and will be discharged on cephalosporins. I told her she will need to have her INR rechecked in 1 week. It was a little low today. Antibiotics will likely affect this. She should follow-up with primary care. She is also told to follow-up with her GI physician. Return precautions discussed. She will be prescribed cefdinir and Zofran upon discharge.     I estimate there is LOW risk for ACUTE APPENDICITIS, PYELONEPHRITIS, BOWEL OBSTRUCTION, CHOLECYSTITIS, DIVERTICULITIS,

## 2021-07-31 NOTE — ED NOTES
Reporting some itching at her IV site after Morphine. Medicated per order. No allergy added as itchinf was localized at IV site No shortness of breath VSS. Repositioned provided po fluids IV ATB infusing.  Aware to d/c after medications     Sussy Dorado RN  07/31/21 3121

## 2021-07-31 NOTE — ED NOTES
Report received care assumed. Reporting ongoing feeling of bloated-ness and a head ache. States after CT laying on her back cause the sensation to be more prominent. States the headaches may be relate to her BP as she has not taken her medications. Reports belching and gas since arrival. repositioned for comfort will discuss with provider plan of care.       Mirna Miller RN  07/31/21 1492

## 2021-07-31 NOTE — ED TRIAGE NOTES
Junie Alvarado is a 79 y.o. female who presents to the ED via daughter for bloating, nausea. Pt reports symptoms began yesterday. Pt also reports she does not have pain, but feels so bloated she cannot eat. Pt reports having 1 episode of abdominal pain, lasting 15 minutes, today at approximately 1600. Pt also reports recent constipation, however had BM this morning. Pt reports that since BM she feels like she still needs to go but cant. Pt has Hx of ulcerative colitis, diverticulitis, and pancreatitis. Pt denies emesis, but does report nausea. Pt resting in bed with call light within reach and updated on plan of care; pending provider to assess. Pt denies any needs at this time.

## 2021-07-31 NOTE — ED NOTES
[facility] Verbalizes understanding of d/c instructions and follow up care.       Danny Justin RN  07/31/21 1554

## 2021-08-02 LAB — URINE CULTURE, ROUTINE: NORMAL

## 2022-08-01 ENCOUNTER — HOSPITAL ENCOUNTER (EMERGENCY)
Age: 82
Discharge: HOME | End: 2022-08-01
Payer: MEDICARE

## 2022-08-01 ENCOUNTER — APPOINTMENT (OUTPATIENT)
Dept: GENERAL RADIOLOGY | Age: 82
End: 2022-08-01
Payer: MEDICARE

## 2022-08-01 VITALS
HEART RATE: 62 BPM | RESPIRATION RATE: 18 BRPM | SYSTOLIC BLOOD PRESSURE: 129 MMHG | BODY MASS INDEX: 41.65 KG/M2 | HEIGHT: 65 IN | DIASTOLIC BLOOD PRESSURE: 63 MMHG | WEIGHT: 250 LBS | OXYGEN SATURATION: 100 % | TEMPERATURE: 97.6 F
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
## 2022-08-01 DIAGNOSIS — M25.561 ACUTE PAIN OF BOTH KNEES: ICD-10-CM

## 2022-08-01 DIAGNOSIS — M25.572 ACUTE LEFT ANKLE PAIN: ICD-10-CM

## 2022-08-01 DIAGNOSIS — M25.562 ACUTE PAIN OF BOTH KNEES: ICD-10-CM

## 2022-08-01 PROCEDURE — 6370000000 HC RX 637 (ALT 250 FOR IP): Performed by: NURSE PRACTITIONER

## 2022-08-01 PROCEDURE — 99283 EMERGENCY DEPT VISIT LOW MDM: CPT

## 2022-08-01 PROCEDURE — 73560 X-RAY EXAM OF KNEE 1 OR 2: CPT

## 2022-08-01 PROCEDURE — 73562 X-RAY EXAM OF KNEE 3: CPT

## 2022-08-01 PROCEDURE — 73610 X-RAY EXAM OF ANKLE: CPT

## 2022-08-01 RX ORDER — OXYCODONE HYDROCHLORIDE 5 MG/1
2.5 TABLET ORAL EVERY 6 HOURS PRN
Qty: 5 TABLET | Refills: 0 | Status: SHIPPED | OUTPATIENT
Start: 2022-08-01 | End: 2022-08-04

## 2022-08-01 RX ORDER — HYDROCODONE BITARTRATE AND ACETAMINOPHEN 5; 325 MG/1; MG/1
1 TABLET ORAL ONCE
Status: COMPLETED | OUTPATIENT
Start: 2022-08-01 | End: 2022-08-01

## 2022-08-01 RX ADMIN — HYDROCODONE BITARTRATE AND ACETAMINOPHEN 1 TABLET: 5; 325 TABLET ORAL at 17:26

## 2022-08-01 ASSESSMENT — ENCOUNTER SYMPTOMS
VOMITING: 0
COUGH: 0
NAUSEA: 0
SHORTNESS OF BREATH: 0
DIARRHEA: 0
PHOTOPHOBIA: 0
BACK PAIN: 0
ABDOMINAL PAIN: 0
SORE THROAT: 0
EYE PAIN: 0

## 2022-08-01 ASSESSMENT — PAIN DESCRIPTION - LOCATION
LOCATION: ANKLE;KNEE
LOCATION: ANKLE;KNEE

## 2022-08-01 ASSESSMENT — PAIN SCALES - GENERAL
PAINLEVEL_OUTOF10: 5
PAINLEVEL_OUTOF10: 5

## 2022-08-01 ASSESSMENT — PAIN - FUNCTIONAL ASSESSMENT: PAIN_FUNCTIONAL_ASSESSMENT: 0-10

## 2022-08-01 ASSESSMENT — PAIN DESCRIPTION - ORIENTATION
ORIENTATION: RIGHT;LEFT
ORIENTATION: RIGHT

## 2022-08-01 ASSESSMENT — PAIN DESCRIPTION - PAIN TYPE: TYPE: ACUTE PAIN

## 2022-08-01 NOTE — ED PROVIDER NOTES
[address] [facility]  ED  EMERGENCY DEPARTMENT ENCOUNTER        Pt Name: Danica Davison  MRN: [id number]  [place] 1940  Date of evaluation: 8/1/2022  Provider: KIRILL Cruz - ERROL  PCP: Karthik Mancilla MD  Note Started: 5:23 PM EDT       FARAZ. I have evaluated this patient. My supervising physician was available for consultation. CHIEF COMPLAINT       Chief Complaint   Patient presents with    Fall     Patient fell trying to get in vehicle today when step stool collapsed. Patient with bilateral knee pain, swelling and bruising with left ankle pain. Patient denies hitting head, no blood thinners. HISTORY OF PRESENT ILLNESS   (Location, Timing/Onset, Context/Setting, Quality, Duration, Modifying Factors, Severity, Associated Signs and Symptoms)  Note limiting factors. Chief Complaint: fall     Danica Davison is a [de-identified] y.o. female who presents status post mechanical fall. She was getting into a vehicle when she states that she slipped off of the stool that she uses to get into vehicles and fell. She landed on both of her knees. She did not hit her head or lose consciousness. She states that she has pain mainly on the right knee and left ankle. Is endorsing some mild left ankle pain. Pain is moderate in nature. No numbness or tingling distal to the sites of injury. No associated neck pain. No back pain. Was ambulatory after the injury event. Was brought to the ED via private vehicle for further evaluation and treatment    Nursing Notes were all reviewed and agreed with or any disagreements were addressed in the HPI. REVIEW OF SYSTEMS    (2-9 systems for level 4, 10 or more for level 5)     Review of Systems   Constitutional:  Negative for chills and fever. HENT:  Negative for congestion and sore throat. Eyes:  Negative for photophobia, pain and visual disturbance. Respiratory:  Negative for cough and shortness of breath.     Cardiovascular:  Negative for (PRINIVIL;ZESTRIL) 10 MG TABLET    Take 10 mg by mouth daily    ONDANSETRON (ZOFRAN ODT) 4 MG DISINTEGRATING TABLET    Take 1 tablet by mouth every 8 hours as needed for Nausea or Vomiting    POTASSIUM CHLORIDE    Take 40 mEq by mouth 2 times daily     SULFAMETHOXAZOLE-TRIMETHOPRIM (BACTRIM;SEPTRA) 400-80 MG PER TABLET    Take 1 tablet by mouth 2 times daily    WARFARIN (COUMADIN) 5 MG TABLET    Take 3 mg by mouth every evening          ALLERGIES     Adhesive tape, Erythromycin, and Pcn [penicillins]    FAMILYHISTORY       Family History   Problem Relation Age of Onset    Heart Disease Mother     Other Father     Other Sister     Cancer Brother           SOCIAL HISTORY       Social History     Tobacco Use    Smoking status: Former     Packs/day: 0.25     Years: 2.00     Pack years: 0.50     Types: Cigarettes     Quit date: 1975     Years since quittin.2    Smokeless tobacco: Never   Vaping Use    Vaping Use: Never used   Substance Use Topics    Alcohol use: No    Drug use: No       SCREENINGS    Marydel Coma Scale  Eye Opening: Spontaneous  Best Verbal Response: Oriented  Best Motor Response: Obeys commands  Moni Coma Scale Score: 15        PHYSICAL EXAM    (up to 7 for level 4, 8 or more for level 5)     ED Triage Vitals [22 1512]   BP Temp Temp Source Heart Rate Resp SpO2 Height Weight   138/72 97.6 °F (36.4 °C) Oral 71 18 100 % 5' 5\" (1.651 m) 250 lb (113.4 kg)       Physical Exam  Vitals and nursing note reviewed. Constitutional:       General: She is not in acute distress. Appearance: Normal appearance. She is obese. She is not ill-appearing, toxic-appearing or diaphoretic. HENT:      Head: Normocephalic and atraumatic. No raccoon eyes, Cohen's sign, right periorbital erythema or left periorbital erythema.       Right Ear: Hearing and external ear normal.      Left Ear: Hearing and external ear normal.      Nose: Nose normal. No laceration, nasal tenderness, mucosal edema, congestion or rhinorrhea. Right Nostril: No epistaxis. Left Nostril: No epistaxis. Mouth/Throat:      Lips: Pink. No lesions. Mouth: Mucous membranes are moist.      Tongue: No lesions. Tongue does not deviate from midline. Pharynx: Oropharynx is clear. Uvula midline. No pharyngeal swelling, oropharyngeal exudate, posterior oropharyngeal erythema or uvula swelling. Tonsils: No tonsillar exudate or tonsillar abscesses. Eyes:      General: Lids are normal.         Right eye: No discharge. Left eye: No discharge. Extraocular Movements: Extraocular movements intact. Neck:      Trachea: Phonation normal. No abnormal tracheal secretions or tracheal deviation. Comments: No meningismus   Cardiovascular:      Rate and Rhythm: Normal rate. Pulses: Normal pulses. Pulmonary:      Effort: Pulmonary effort is normal. No respiratory distress. Breath sounds: No stridor. Musculoskeletal:         General: Tenderness and signs of injury present. No swelling or deformity. Normal range of motion. Cervical back: Full passive range of motion without pain, normal range of motion and neck supple. No rigidity. No spinous process tenderness or muscular tenderness. Right lower leg: No edema. Left lower leg: No edema. Comments: Some generalized tenderness around the patella of the right knee and left lateral malleoli or tenderness. No reproducible left knee tenderness. No overt edema crepitus, deformities or ecchymosis noted in these regions. Negative anterior drawer test bilaterally. No joint instability of the left ankle. Does have full range of motion but this does elicit some pain in the right knee and left ankle. Is neurovascularly intact in all 4 extremities. Bilateral pedal pulses 2+ with less than 3-second capillary refill   Lymphadenopathy:      Cervical: No cervical adenopathy. Skin:     General: Skin is warm and dry.    Neurological:      General: No focal deficit present. Mental Status: She is alert and oriented to person, place, and time. GCS: GCS eye subscore is 4. GCS verbal subscore is 5. GCS motor subscore is 6. Cranial Nerves: Cranial nerves are intact. Sensory: Sensation is intact. No sensory deficit. Motor: Motor function is intact. Coordination: Romberg sign negative. Gait: Gait is intact. Gait normal.   Psychiatric:         Mood and Affect: Mood normal.         Behavior: Behavior normal.         Thought Content: Thought content normal.         Judgment: Judgment normal.       DIAGNOSTIC RESULTS   LABS:    Labs Reviewed - No data to display    When ordered only abnormal lab results are displayed. All other labs were within normal range or not returned as of this dictation. EKG: When ordered, EKG's are interpreted by the Emergency Department Physician in the absence of a cardiologist.  Please see their note for interpretation of EKG. RADIOLOGY:   Non-plain film images such as CT, Ultrasound and MRI are read by the radiologist. Plain radiographic images are visualized and preliminarily interpreted by the ED Provider with the below findings:        Interpretation per the Radiologist below, if available at the time of this note:    XR KNEE LEFT (1-2 VIEWS)   Final Result   Total knee arthroplasty without radiographic evidence of hardware   complication or acute bony abnormality. XR KNEE RIGHT (1-2 VIEWS)   Final Result   No acute osseous abnormality of the right knee post arthroplasty. XR ANKLE LEFT (MIN 3 VIEWS)   Final Result   No acute osseous abnormality of the left ankle. Diffuse soft tissue swelling.            XR KNEE LEFT (1-2 VIEWS)    Result Date: 8/1/2022  EXAMINATION: 2 XRAY VIEWS OF THE LEFT KNEE 8/1/2022 12:19 pm COMPARISON: 11/11/2019, 05/07/2018 HISTORY: ORDERING SYSTEM PROVIDED HISTORY: injury, pain TECHNOLOGIST PROVIDED HISTORY: Reason for exam:->injury, pain Reason for Exam: fall, left knee pain FINDINGS: Total knee arthroplasty hardware appears intact and unchanged from remote prior imaging. There is no periprosthetic fracture. No effusion or retained foreign body in the soft tissues. Total knee arthroplasty without radiographic evidence of hardware complication or acute bony abnormality. XR KNEE RIGHT (1-2 VIEWS)    Result Date: 8/1/2022  EXAMINATION: 2 XRAY VIEWS OF THE RIGHT KNEE 8/1/2022 3:19 pm COMPARISON: 11/11/2019. HISTORY: ORDERING SYSTEM PROVIDED HISTORY: injury, pain TECHNOLOGIST PROVIDED HISTORY: Reason for exam:->injury, pain Reason for Exam: fall, right knee pain FINDINGS: Status post right knee arthroplasty with no evidence of fracture or hardware complication. There is no significant joint effusion or focal soft tissue abnormality. Osseous structures are otherwise unremarkable as visualized. No acute osseous abnormality of the right knee post arthroplasty. XR ANKLE LEFT (MIN 3 VIEWS)    Result Date: 8/1/2022  EXAMINATION: THREE XRAY VIEWS OF THE LEFT ANKLE 8/1/2022 3:19 pm COMPARISON: None. HISTORY: ORDERING SYSTEM PROVIDED HISTORY: injury, pain TECHNOLOGIST PROVIDED HISTORY: Reason for exam:->injury, pain Reason for Exam: fall, left ankle pain FINDINGS: No acute fracture or dislocation. The ankle mortise and talar dome are maintained. There is diffuse soft tissue swelling about the ankle. Enthesopathy at the plantar insertion on the calcaneus. No acute osseous abnormality of the left ankle. Diffuse soft tissue swelling. PROCEDURES   Unless otherwise noted below, none     Procedures    CRITICAL CARE TIME   CRITICAL CARE NOTE:    Abraham Koehler CNP am the primary clinician of record. There was a high probability of clinically significant life-threatening deterioration of the patient's condition requiring my urgent intervention. Total critical care time was at least 5 minutes.     This includes vital sign monitoring, pulse oximetry monitoring, telemetry monitoring, clinical response to the IV medications, reviewing the nursing notes, consultation time, dictation/documentation time, and interpretation of the labwork. This excludes any separately billable procedures performed. CONSULTS:  None      EMERGENCY DEPARTMENT COURSE and DIFFERENTIAL DIAGNOSIS/MDM:   Vitals:    Vitals:    08/01/22 1512   BP: 138/72   Pulse: 71   Resp: 18   Temp: 97.6 °F (36.4 °C)   TempSrc: Oral   SpO2: 100%   Weight: 250 lb (113.4 kg)   Height: 5' 5\" (1.651 m)       Patient was given the following medications:  Medications   HYDROcodone-acetaminophen (NORCO) 5-325 MG per tablet 1 tablet (1 tablet Oral Given 8/1/22 1726)         Is this patient to be included in the SEP-1 Core Measure due to severe sepsis or septic shock? No   Exclusion criteria - the patient is NOT to be included for SEP-1 Core Measure due to: Infection is not suspected    MDM: See HPI and above for full presentation physical exam.  Differential diagnoses included but not limited to bony fracture, musculoskeletal pain, ligamental strain, tendon injury, neurovascular injury, sprain/strain, other    Is neurovascularly intact in all 4 extremities. No joint instability. Plain films as read by the radiologist as above. I do believe that the patient is stable for discharge home. We will give her a short course of pain medications, in the interim and to supplement NSAIDs and APAP primarily for the pain as well as to utilize RICE on the areas. She verbalized understanding. Has no further questions or concerns. Was discharged home in stable condition as she remained afebrile and hemodynamically stable throughout her entire ED course. Will give her a referral for orthopedics as needed and follow-up with her PCP primarily. I estimate there is LOW risk for COMPARTMENT SYNDROME, DEEP VENOUS THROMBOSIS, SEPTIC ARTHRITIS, OR NEUROVASCULAR INJURY, thus I consider the discharge disposition reasonable. Danica Davison and I have discussed the diagnosis and risks, and we agree with discharging home to follow-up with their primary doctor or the referral orthopedist. We also discussed returning to the Emergency Department immediately if new or worsening symptoms occur. We have discussed the symptoms which are most concerning (e.g., changing or worsening pain, numbness, weakness) that necessitate immediate return. FINAL IMPRESSION      1. Fall, initial encounter    2. Acute pain of both knees    3. Acute left ankle pain          DISPOSITION/PLAN   DISPOSITION Decision To Discharge 08/01/2022 05:18:19 PM      PATIENT REFERRED TO:  Mitchel Lehman [address]  [phone]    Schedule an appointment as soon as possible for a visit in 2 days      Bhavani Potter [address] [facility] [address] [phone]    Schedule an appointment as soon as possible for a visit in 3 days  As needed, If symptoms worsen    [facility]  ED  [facility] [address]  [phone]  Go to   As needed, If symptoms worsen      DISCHARGE MEDICATIONS:  New Prescriptions    OXYCODONE (ROXICODONE) 5 MG IMMEDIATE RELEASE TABLET    Take 0.5 tablets by mouth every 6 hours as needed for Pain for up to 3 days.        DISCONTINUED MEDICATIONS:  Discontinued Medications    No medications on file              (Please note that portions of this note were completed with a voice recognition program.  Efforts were made to edit the dictations but occasionally words are mis-transcribed.)    KIRILL Cruz CNP (electronically signed)            KIRILL Cruz CNP  08/01/22 4120 KIRILL Keller CNP  08/01/22 2242

## 2022-08-01 NOTE — ED NOTES
Pt ambulatory with steady gait with baseline walker. Pt denies any lightheaded or dizziness.       Juan Carlos Smith RN  08/01/22 7565

## 2022-08-01 NOTE — ED NOTES
--Patient provided with discharge instructions and any prescriptions. --Instructions, dosing, and follow-up appointments reviewed with patient/family. No further questions or needs at this time. --Vital signs and patient stable upon discharge. --Patient ambulatory to lobby with family.      Nicolás Cummings RN  08/01/22 1878

## 2023-07-26 ENCOUNTER — HOSPITAL ENCOUNTER (OUTPATIENT)
Dept: ENDOSCOPY | Age: 83
Setting detail: OUTPATIENT SURGERY
Discharge: HOME | End: 2023-07-26
Payer: MEDICARE

## 2023-07-26 VITALS
HEART RATE: 55 BPM | DIASTOLIC BLOOD PRESSURE: 77 MMHG | SYSTOLIC BLOOD PRESSURE: 157 MMHG | WEIGHT: 265 LBS | BODY MASS INDEX: 44.15 KG/M2 | HEIGHT: 65 IN | RESPIRATION RATE: 16 BRPM

## 2023-07-26 PROCEDURE — 6370000000 HC RX 637 (ALT 250 FOR IP): Performed by: OPHTHALMOLOGY

## 2023-07-26 PROCEDURE — 2500000003 HC RX 250 WO HCPCS: Performed by: OPHTHALMOLOGY

## 2023-07-26 PROCEDURE — 66821 AFTER CATARACT LASER SURGERY: CPT

## 2023-07-26 RX ORDER — APRACLONIDINE HYDROCHLORIDE 5 MG/ML
1 SOLUTION/ DROPS OPHTHALMIC 2 TIMES DAILY PRN
Status: COMPLETED | OUTPATIENT
Start: 2023-07-26 | End: 2023-07-26

## 2023-07-26 RX ORDER — TROPICAMIDE 10 MG/ML
1 SOLUTION/ DROPS OPHTHALMIC ONCE
Status: COMPLETED | OUTPATIENT
Start: 2023-07-26 | End: 2023-07-26

## 2023-07-26 RX ORDER — PROPARACAINE HYDROCHLORIDE 5 MG/ML
1 SOLUTION/ DROPS OPHTHALMIC ONCE
Status: COMPLETED | OUTPATIENT
Start: 2023-07-26 | End: 2023-07-26

## 2023-07-26 RX ORDER — PHENYLEPHRINE HCL 2.5 %
1 DROPS OPHTHALMIC (EYE) ONCE
Status: COMPLETED | OUTPATIENT
Start: 2023-07-26 | End: 2023-07-26

## 2023-07-26 RX ORDER — SOFT LENS RINSE,STORE SOLUTION
SOLUTION, NON-ORAL MISCELLANEOUS ONCE
Status: COMPLETED | OUTPATIENT
Start: 2023-07-26 | End: 2023-07-26

## 2023-07-26 RX ADMIN — PROPARACAINE HYDROCHLORIDE 1 DROP: 5 SOLUTION/ DROPS OPHTHALMIC at 12:05

## 2023-07-26 RX ADMIN — PHENYLEPHRINE HYDROCHLORIDE 1 DROP: 25 SOLUTION/ DROPS OPHTHALMIC at 11:24

## 2023-07-26 RX ADMIN — WATER, PURIFIED: 99.05 LIQUID OPHTHALMIC at 12:19

## 2023-07-26 RX ADMIN — TROPICAMIDE 1 DROP: 10 SOLUTION/ DROPS OPHTHALMIC at 11:21

## 2023-07-26 RX ADMIN — APRACLONIDINE 1 DROP: 5.75 SOLUTION OPHTHALMIC at 11:27

## 2023-07-26 RX ADMIN — APRACLONIDINE 1 DROP: 5.75 SOLUTION OPHTHALMIC at 12:21

## 2023-07-26 ASSESSMENT — PAIN SCALES - GENERAL
PAINLEVEL_OUTOF10: 0
PAINLEVEL_OUTOF10: 0

## 2023-07-26 NOTE — OP NOTE
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 Dilation drops were then applied to induce adequate mydriasis. The patient was then taken to the laser room and  positioned in front of the YAG laser. Topical Proparacaine drops were then applied. The patient then received 36 applications of 1.8 millijoules to the densely opacified posterior capsule. Once this was accomplished, a nice capsulotomy was performed. The eye was then rinsed using sterile saline. Iopidine 0.5% was then applied. The patient's pressure will be measured closely and conservatively as an outpatient in my office. Patient tolerated the procedure well without any complications.        Patient: Elkin Bolanos  YOB: 1940  MRN: [id number]    Date of Procedure:  07/26/2023    * No surgery found *    Electronically signed by Papo Dos Santos MD on 7/26/2023 at 12:02 PM

## 2023-07-26 NOTE — PROGRESS NOTES
Pt here for laser eye procedure with Dr. Wilfred Salmon  Procedure explained to pt and consent obtained  Laser eye procedure completed  See 's procedural note for details  Pt jinny well  No c/o's voiced   Discharge instructions reviewed with pt and copy given   Understanding verbalized  Pt discharged to home in stable condition

## 2023-07-26 NOTE — DISCHARGE INSTRUCTIONS
THE OFFICE WILL CALL YOU TO SCHEDULE THE NEXT FOLLOW UP APPOINTMENT    RESUME USUAL DIET    RESUME USUAL ACTIVITY    RESUME USUAL MEDICATIONS    IF ANY PROBLEMS DEVELOP SUCH AS BLEEDING, LOSS OF VISION OR PAIN THAT IS NOT CONTROLLED WITH TYLENOL OR ADVIL     CALL DR. Mendy Ulloa  AT  [phone]

## 2024-03-26 NOTE — DISCHARGE INSTRUCTIONS
You have been prescribed an opiate medication, oxycodone. Opiates can be addictive, even in small quantities. Take only what you need to bear your pain. Return any unused portion to the pharmacy from which you obtained it. Opiates may also be sedating. While this is not a problem under normal circumstances, when taken with alcohol or while driving or operating heavy machinery, this medicine could cause you to become too sleepy and/or hurt yourself or others. Therefore, it is very important that you DO NOT DRIVE, DRINK ALCOHOL, OR OPERATE HEAVY MACHINERY WHILE TAKING THE MEDICINE(S) LISTED ABOVE. patient

## 2024-08-26 LAB
ALBUMIN: 2.9 G/DL
ALP BLD-CCNC: 77 U/L
ALT SERPL-CCNC: 8 U/L
ANION GAP SERPL CALCULATED.3IONS-SCNC: ABNORMAL MMOL/L
AST SERPL-CCNC: 15 U/L
BILIRUB SERPL-MCNC: 0.6 MG/DL (ref 0.1–1.4)
BUN BLDV-MCNC: ABNORMAL MG/DL
CALCIUM SERPL-MCNC: 8.5 MG/DL
CHLORIDE BLD-SCNC: 103 MMOL/L
CHOLESTEROL, TOTAL: 132 MG/DL
CHOLESTEROL/HDL RATIO: NORMAL
CO2: 30 MMOL/L
CREAT SERPL-MCNC: 1.1 MG/DL
GFR, ESTIMATED: 50
GLUCOSE BLD-MCNC: 97 MG/DL
HDLC SERPL-MCNC: NORMAL MG/DL
LDL CHOLESTEROL: 70
NONHDLC SERPL-MCNC: NORMAL MG/DL
POTASSIUM SERPL-SCNC: 4.5 MMOL/L
SODIUM BLD-SCNC: 141 MMOL/L
TOTAL PROTEIN: 5.1 G/DL (ref 6.4–8.2)
TRIGL SERPL-MCNC: 110 MG/DL
VLDLC SERPL CALC-MCNC: 22 MG/DL

## 2024-11-05 NOTE — PROGRESS NOTES
below  Prior to Admission medications    Medication Sig Start Date End Date Taking? Authorizing Provider   acetaminophen (TYLENOL) 325 MG tablet Take 2 tablets by mouth every 6 hours as needed   Yes Yao Weaver MD   LACTOBACILLUS PO Take by mouth   Yes Yao Weaver MD   Cranberry-Vitamin C-Probiotic (AZO CRANBERRY PO) Take by mouth   Yes Yao Weaver MD   Calcium Carb-Cholecalciferol (CALCIUM + D3 PO) Take by mouth   Yes Yao Weaver MD   loratadine (CLARITIN) 10 MG capsule Take 1 capsule by mouth daily   Yes Yao Weaver MD   meclizine (ANTIVERT) 25 MG tablet Take 1 tablet by mouth 3 times daily as needed   Yes Yao Weaver MD   melatonin 3 MG TABS tablet Take 1 tablet by mouth as needed   Yes Yao Weaver MD   Multiple Vitamin (MULTIVITAMIN ADULT PO) Take by mouth   Yes Yao Weaver MD   HYDROcodone-acetaminophen (NORCO) 5-325 MG per tablet Take 1 tablet by mouth every 6 hours as needed for Pain. Max Daily Amount: 4 tablets   Yes Yao Weaver MD   pantoprazole (PROTONIX) 40 MG tablet Take 1 tablet by mouth daily   Yes Yao Weaver MD   bumetanide (BUMEX) 2 MG tablet Take 1 tablet by mouth daily Give 2 tablets by mouth one time a day for edema   Yes ProviderYao MD   olopatadine (PATANOL) 0.1 % ophthalmic solution 1 drop as needed for Allergies   Yes Yao Weaver MD   SIMETHICONE PO Take by mouth as needed   Yes Yao Weaver MD   GLYCERIN PO Take by mouth   Yes Yao Weaver MD   magnesium hydroxide (MILK OF MAGNESIA) 400 MG/5ML suspension Take by mouth daily as needed for Constipation   Yes ProviderYao MD   gabapentin (NEURONTIN) 100 MG capsule Take 1 capsule by mouth 2 times daily.   Yes Yao Weaver MD   Fluticasone Propionate (FLONASE NA) 1 spray by Nasal route 2 times daily   Yes Yao Weaver MD   citalopram (CELEXA) 40 MG tablet Take 1 tablet by mouth daily

## 2024-11-07 ENCOUNTER — OFFICE VISIT (OUTPATIENT)
Dept: CARDIOLOGY CLINIC | Age: 84
End: 2024-11-07

## 2024-11-07 ENCOUNTER — TELEPHONE (OUTPATIENT)
Dept: CARDIOLOGY CLINIC | Age: 84
End: 2024-11-07

## 2024-11-07 ENCOUNTER — HOSPITAL ENCOUNTER (OUTPATIENT)
Dept: CT IMAGING | Age: 84
Discharge: HOME | End: 2024-11-07
Attending: INTERNAL MEDICINE
Payer: MEDICARE

## 2024-11-07 VITALS
HEART RATE: 54 BPM | WEIGHT: 265 LBS | BODY MASS INDEX: 44.15 KG/M2 | SYSTOLIC BLOOD PRESSURE: 104 MMHG | OXYGEN SATURATION: 94 % | HEIGHT: 65 IN | DIASTOLIC BLOOD PRESSURE: 62 MMHG

## 2024-11-07 DIAGNOSIS — E66.01 MORBID OBESITY: ICD-10-CM

## 2024-11-07 DIAGNOSIS — Z76.89 ESTABLISHING CARE WITH NEW DOCTOR, ENCOUNTER FOR: ICD-10-CM

## 2024-11-07 DIAGNOSIS — R00.1 BRADYCARDIA: ICD-10-CM

## 2024-11-07 DIAGNOSIS — I48.20 CHRONIC ATRIAL FIBRILLATION (HCC): Primary | ICD-10-CM

## 2024-11-07 DIAGNOSIS — R42 DIZZINESS: ICD-10-CM

## 2024-11-07 DIAGNOSIS — Z79.899 MEDICATION MANAGEMENT: ICD-10-CM

## 2024-11-07 DIAGNOSIS — R29.6 MULTIPLE FALLS: ICD-10-CM

## 2024-11-07 DIAGNOSIS — R06.02 SHORTNESS OF BREATH: ICD-10-CM

## 2024-11-07 LAB
PERFORMED ON: NORMAL
POC CREATININE: 0.9 MG/DL (ref 0.6–1.2)
POC SAMPLE TYPE: NORMAL

## 2024-11-07 PROCEDURE — 70470 CT HEAD/BRAIN W/O & W/DYE: CPT

## 2024-11-07 PROCEDURE — 82565 ASSAY OF CREATININE: CPT

## 2024-11-07 PROCEDURE — 6360000004 HC RX CONTRAST MEDICATION: Performed by: INTERNAL MEDICINE

## 2024-11-07 RX ORDER — BUMETANIDE 2 MG/1
2 TABLET ORAL DAILY
COMMUNITY

## 2024-11-07 RX ORDER — LORATADINE 10 MG/1
10 CAPSULE, LIQUID FILLED ORAL DAILY
COMMUNITY

## 2024-11-07 RX ORDER — HYDROCODONE BITARTRATE AND ACETAMINOPHEN 5; 325 MG/1; MG/1
1 TABLET ORAL EVERY 6 HOURS PRN
COMMUNITY

## 2024-11-07 RX ORDER — LANOLIN ALCOHOL/MO/W.PET/CERES
3 CREAM (GRAM) TOPICAL AS NEEDED
COMMUNITY

## 2024-11-07 RX ORDER — POTASSIUM CHLORIDE 1500 MG/1
40 TABLET, EXTENDED RELEASE ORAL DAILY
COMMUNITY
Start: 2024-10-04 | End: 2024-11-07

## 2024-11-07 RX ORDER — PANTOPRAZOLE SODIUM 40 MG/1
40 TABLET, DELAYED RELEASE ORAL DAILY
COMMUNITY

## 2024-11-07 RX ORDER — IOPAMIDOL 755 MG/ML
75 INJECTION, SOLUTION INTRAVASCULAR
Status: COMPLETED | OUTPATIENT
Start: 2024-11-07 | End: 2024-11-07

## 2024-11-07 RX ORDER — MECLIZINE HYDROCHLORIDE 25 MG/1
25 TABLET ORAL 3 TIMES DAILY PRN
COMMUNITY

## 2024-11-07 RX ORDER — OLOPATADINE HYDROCHLORIDE 1 MG/ML
1 SOLUTION/ DROPS OPHTHALMIC AS NEEDED
COMMUNITY

## 2024-11-07 RX ORDER — ACETAMINOPHEN 325 MG/1
650 TABLET ORAL EVERY 6 HOURS PRN
COMMUNITY

## 2024-11-07 RX ADMIN — IOPAMIDOL 75 ML: 755 INJECTION, SOLUTION INTRAVENOUS at 12:21

## 2024-11-07 NOTE — PATIENT INSTRUCTIONS
Plan:  Decrease Atenolol to 25mg daily to avoid hypotension  Order stat CT head due to recent fall with complaints of dizziness and headaches.   Order Echocardiogram to view size and strength of the heart    Order fasting lab work: Lipids, TSH, A1C, CMP and CBC. You will need to be fasting for 8 hours, plan to get this completed on the day of the echo.   Referral to [facility] clinic to discuss.   We will get your records from [facility].   Patient should be on low sodium cardiac diet, and does not need to be on fluid restriction.   Follow up with me in 2 months.    Your provider has ordered testing for further evaluation.  An order/prescription has been included in your paper work.   To schedule outpatient testing, contact Central Scheduling by calling [address] [place] [address] ([phone]).

## 2024-11-07 NOTE — TELEPHONE ENCOUNTER
Dr. TROY is referring pt for Watchman referral due to intolerance to oral anticoagulants, she has had multiple falls, and they have stopped her coumadin due to it. Thanks!

## 2024-11-08 ENCOUNTER — TELEPHONE (OUTPATIENT)
Dept: CARDIOLOGY CLINIC | Age: 84
End: 2024-11-08

## 2024-11-08 LAB
BASOPHILS ABSOLUTE: 0 /ΜL
BASOPHILS RELATIVE PERCENT: 0.6 %
EOSINOPHILS ABSOLUTE: 0.3 /ΜL
EOSINOPHILS RELATIVE PERCENT: 0.4 %
HCT VFR BLD CALC: 34 % (ref 36–46)
HEMOGLOBIN: 11 G/DL (ref 12–16)
LYMPHOCYTES ABSOLUTE: 1.4 /ΜL
LYMPHOCYTES RELATIVE PERCENT: 26.6 %
MCH RBC QN AUTO: 31.3 PG
MCHC RBC AUTO-ENTMCNC: 32.4 G/DL
MCV RBC AUTO: 96.9 FL
MONOCYTES ABSOLUTE: 0.5 /ΜL
MONOCYTES RELATIVE PERCENT: 9.4 %
NEUTROPHILS ABSOLUTE: 3 /ΜL
NEUTROPHILS RELATIVE PERCENT: 56.8 %
PLATELET # BLD: 206 K/ΜL
PMV BLD AUTO: 10.5 FL
RBC # BLD: 3.51 10^6/ΜL
TSH SERPL DL<=0.05 MIU/L-ACNC: 0.96 UIU/ML
WBC # BLD: 5.3 10^3/ML

## 2024-11-08 NOTE — TELEPHONE ENCOUNTER
Maile from [facility] [employer] called and would like to know what AMBER's ideal weight for the pt is since she is no longer on fluid restrictions.

## 2024-11-08 NOTE — TELEPHONE ENCOUNTER
Sending to MATHEW VILLANUEVA Please let me know if pt is a good candidate to proceed with scheduling watchman consult. Thank you!

## 2024-11-11 ENCOUNTER — TELEPHONE (OUTPATIENT)
Dept: CARDIOLOGY CLINIC | Age: 84
End: 2024-11-11

## 2024-11-11 NOTE — TELEPHONE ENCOUNTER
Bartolome Garsia MD  [facility] Staff3 days ago       Patient should be on 2 liter fluid restriction             Spoke with Maile. Maile wants to verify that pt should be on 2 liter fluid restriction. She states last office note states pt does not need to be on a fluid restriction.     Per last OV 11/07/24  Plan:  Decrease Atenolol to 25mg daily to avoid hypotension  Order stat CT head due to recent fall with complaints of dizziness and headaches.   Order Echocardiogram to view size and strength of the heart    Order fasting lab work: Lipids, TSH, A1C, CMP and CBC. You will need to be fasting for 8 hours, plan to get this completed on the day of the echo.   Referral to [facility] clinic to discuss.   We will get your records from [facility].   Patient should be on low sodium cardiac diet, and does not need to be on fluid restriction.   Follow up with me in 2 months.

## 2024-11-12 ENCOUNTER — TELEPHONE (OUTPATIENT)
Dept: CARDIOLOGY CLINIC | Age: 84
End: 2024-11-12

## 2024-11-12 NOTE — TELEPHONE ENCOUNTER
Received fax from [employer] regarding pt weight   Fax Scanned into media       Pts weights   11/03 262lbs   11/05  264.8lbs   11/06 267.8lbs   11/07 267.4 lbs   11/09 270.4 lbs   11/10 273lbs     AMBER please advise

## 2024-11-12 NOTE — TELEPHONE ENCOUNTER
Spoke with Coral Daniels  and scheduled pt with [facility] for 12/20/24 at 1030a. They were unable to have a dual appt for the pt on 12/13/24. They preferred to schedule for 12/20/24 with [facility] for the consult.

## 2024-11-12 NOTE — TELEPHONE ENCOUNTER
Bartolome Garsia MD   to Enedina Botello RN       11/11/24  4:22 PM  Please call patient and let her know we would like a 2 liter fluid restriction for now until we see her back in office    Spoke with [facility]. Relayed message. Also relayed message in encounter from 11/12/24

## 2024-11-12 NOTE — TELEPHONE ENCOUNTER
Spoke with [employer]. They v/u also relayed message from 11/11 see other encounter.

## 2024-11-19 ENCOUNTER — HOSPITAL ENCOUNTER (OUTPATIENT)
Dept: CARDIOLOGY | Age: 84
Discharge: HOME | End: 2024-11-21
Attending: INTERNAL MEDICINE
Payer: MEDICARE

## 2024-11-19 VITALS
WEIGHT: 265 LBS | BODY MASS INDEX: 44.15 KG/M2 | DIASTOLIC BLOOD PRESSURE: 77 MMHG | HEIGHT: 65 IN | SYSTOLIC BLOOD PRESSURE: 155 MMHG

## 2024-11-19 DIAGNOSIS — R42 DIZZINESS: ICD-10-CM

## 2024-11-19 DIAGNOSIS — R06.02 SHORTNESS OF BREATH: ICD-10-CM

## 2024-11-19 LAB
ECHO AO ROOT DIAM: 3.2 CM
ECHO AO ROOT INDEX: 1.43 CM/M2
ECHO AV PEAK GRADIENT: 13 MMHG
ECHO AV PEAK GRADIENT: 13 MMHG
ECHO AV PEAK VELOCITY: 1.8 M/S
ECHO AV VELOCITY RATIO: 0.56
ECHO BSA: 2.35 M2
ECHO EST RA PRESSURE: 3 MMHG
ECHO LA AREA 2C: 20.3 CM2
ECHO LA AREA 4C: 21.7 CM2
ECHO LA DIAMETER INDEX: 2.06 CM/M2
ECHO LA DIAMETER: 4.6 CM
ECHO LA MAJOR AXIS: 6.3 CM
ECHO LA MINOR AXIS: 5.8 CM
ECHO LA TO AORTIC ROOT RATIO: 1.44
ECHO LA VOL BP: 63 ML (ref 22–52)
ECHO LA VOL MOD A2C: 59 ML (ref 22–52)
ECHO LA VOL MOD A4C: 61 ML (ref 22–52)
ECHO LA VOL/BSA BIPLANE: 28 ML/M2 (ref 16–34)
ECHO LA VOLUME INDEX MOD A2C: 26 ML/M2 (ref 16–34)
ECHO LA VOLUME INDEX MOD A4C: 27 ML/M2 (ref 16–34)
ECHO LV E' LATERAL VELOCITY: 17.6 CM/S
ECHO LV E' SEPTAL VELOCITY: 11.5 CM/S
ECHO LV EDV 3D: 110 ML
ECHO LV EDV INDEX 3D: 49 ML/M2
ECHO LV EF PHYSICIAN: 58 %
ECHO LV EJECTION FRACTION 3D: 56 %
ECHO LV ESV 3D: 49 ML
ECHO LV ESV INDEX 3D: 22 ML/M2
ECHO LV FRACTIONAL SHORTENING: 29 % (ref 28–44)
ECHO LV GLOBAL LONGITUDINAL STRAIN (GLS): -20.4 %
ECHO LV GLOBAL LONGITUDINAL STRAIN (GLS): -21 %
ECHO LV GLOBAL LONGITUDINAL STRAIN (GLS): -21.2 %
ECHO LV GLOBAL LONGITUDINAL STRAIN (GLS): -22 %
ECHO LV INTERNAL DIMENSION DIASTOLE INDEX: 2.2 CM/M2
ECHO LV INTERNAL DIMENSION DIASTOLIC: 4.9 CM (ref 3.9–5.3)
ECHO LV INTERNAL DIMENSION SYSTOLIC INDEX: 1.57 CM/M2
ECHO LV INTERNAL DIMENSION SYSTOLIC: 3.5 CM
ECHO LV ISOVOLUMETRIC RELAXATION TIME (IVRT): 69 MS
ECHO LV IVSD: 0.8 CM (ref 0.6–0.9)
ECHO LV MASS 2D: 141.9 G (ref 67–162)
ECHO LV MASS 3D INDEX: 61.4 G/M2
ECHO LV MASS 3D: 137 G
ECHO LV MASS INDEX 2D: 63.6 G/M2 (ref 43–95)
ECHO LV POSTERIOR WALL DIASTOLIC: 0.9 CM (ref 0.6–0.9)
ECHO LV RELATIVE WALL THICKNESS RATIO: 0.37
ECHO LVOT PEAK GRADIENT: 4 MMHG
ECHO LVOT PEAK VELOCITY: 1 M/S
ECHO MV E VELOCITY: 0.93 M/S
ECHO MV E/E' LATERAL: 5.28
ECHO MV E/E' RATIO (AVERAGED): 6.69
ECHO MV E/E' SEPTAL: 8.09
ECHO RA AREA 4C: 20.6 CM2
ECHO RA END SYSTOLIC VOLUME APICAL 4 CHAMBER INDEX BSA: 25 ML/M2
ECHO RA VOLUME: 55 ML
ECHO RIGHT VENTRICULAR SYSTOLIC PRESSURE (RVSP): 30 MMHG
ECHO RV FREE WALL PEAK S': 12.5 CM/S
ECHO RV TAPSE: 2.4 CM (ref 1.7–?)
ECHO TV REGURGITANT MAX VELOCITY: 2.58 M/S
ECHO TV REGURGITANT PEAK GRADIENT: 27 MMHG

## 2024-11-19 PROCEDURE — 93306 TTE W/DOPPLER COMPLETE: CPT

## 2024-11-26 ENCOUNTER — APPOINTMENT (OUTPATIENT)
Dept: CT IMAGING | Age: 84
End: 2024-11-26
Payer: MEDICARE

## 2024-11-26 ENCOUNTER — HOSPITAL ENCOUNTER (INPATIENT)
Age: 84
LOS: 10 days | Discharge: NURSING, REHAB OR LONG-TERM CARE | End: 2024-12-06
Attending: EMERGENCY MEDICINE | Admitting: INTERNAL MEDICINE
Payer: MEDICARE

## 2024-11-26 ENCOUNTER — TELEPHONE (OUTPATIENT)
Dept: CARDIOLOGY CLINIC | Age: 84
End: 2024-11-26

## 2024-11-26 ENCOUNTER — APPOINTMENT (OUTPATIENT)
Dept: GENERAL RADIOLOGY | Age: 84
End: 2024-11-26
Payer: MEDICARE

## 2024-11-26 DIAGNOSIS — M54.9 CHRONIC BACK PAIN, UNSPECIFIED BACK LOCATION, UNSPECIFIED BACK PAIN LATERALITY: ICD-10-CM

## 2024-11-26 DIAGNOSIS — R94.31 QT PROLONGATION: ICD-10-CM

## 2024-11-26 DIAGNOSIS — K81.0 ACUTE CHOLECYSTITIS: ICD-10-CM

## 2024-11-26 DIAGNOSIS — F41.9 ANXIETY: Primary | ICD-10-CM

## 2024-11-26 DIAGNOSIS — J18.9 PNEUMONIA DUE TO INFECTIOUS ORGANISM, UNSPECIFIED LATERALITY, UNSPECIFIED PART OF LUNG: ICD-10-CM

## 2024-11-26 DIAGNOSIS — I47.29 NONSUSTAINED VENTRICULAR TACHYCARDIA (HCC): ICD-10-CM

## 2024-11-26 DIAGNOSIS — G89.29 CHRONIC BACK PAIN, UNSPECIFIED BACK LOCATION, UNSPECIFIED BACK PAIN LATERALITY: ICD-10-CM

## 2024-11-26 DIAGNOSIS — D72.829 LEUKOCYTOSIS, UNSPECIFIED TYPE: ICD-10-CM

## 2024-11-26 DIAGNOSIS — R79.89 ELEVATED TROPONIN: ICD-10-CM

## 2024-11-26 LAB
ALBUMIN SERPL-MCNC: 4.2 G/DL (ref 3.4–5)
ALBUMIN/GLOB SERPL: 1.3 {RATIO} (ref 1.1–2.2)
ALP SERPL-CCNC: 118 U/L (ref 40–129)
ALT SERPL-CCNC: 7 U/L (ref 10–40)
ANION GAP SERPL CALCULATED.3IONS-SCNC: 14 MMOL/L (ref 3–16)
AST SERPL-CCNC: 23 U/L (ref 15–37)
BASOPHILS # BLD: 0.1 K/UL (ref 0–0.2)
BASOPHILS NFR BLD: 0.4 %
BILIRUB SERPL-MCNC: 1.9 MG/DL (ref 0–1)
BILIRUB UR QL STRIP.AUTO: NEGATIVE
BUN SERPL-MCNC: 44 MG/DL (ref 7–20)
CALCIUM SERPL-MCNC: 9.9 MG/DL (ref 8.3–10.6)
CHLORIDE SERPL-SCNC: 89 MMOL/L (ref 99–110)
CLARITY UR: CLEAR
CO2 SERPL-SCNC: 37 MMOL/L (ref 21–32)
COLOR UR: YELLOW
CREAT SERPL-MCNC: 1.4 MG/DL (ref 0.6–1.2)
DEPRECATED RDW RBC AUTO: 13.3 % (ref 12.4–15.4)
EKG DIAGNOSIS: NORMAL
EKG DIAGNOSIS: NORMAL
EKG Q-T INTERVAL: 536 MS
EKG Q-T INTERVAL: 676 MS
EKG QRS DURATION: 88 MS
EKG QRS DURATION: 90 MS
EKG QTC CALCULATION (BAZETT): 598 MS
EKG QTC CALCULATION (BAZETT): 750 MS
EKG R AXIS: -30 DEGREES
EKG R AXIS: -30 DEGREES
EKG T AXIS: -32 DEGREES
EKG T AXIS: 186 DEGREES
EKG VENTRICULAR RATE: 74 BPM
EKG VENTRICULAR RATE: 75 BPM
EOSINOPHIL # BLD: 0.1 K/UL (ref 0–0.6)
EOSINOPHIL NFR BLD: 0.5 %
GFR SERPLBLD CREATININE-BSD FMLA CKD-EPI: 37 ML/MIN/{1.73_M2}
GLUCOSE SERPL-MCNC: 143 MG/DL (ref 70–99)
GLUCOSE UR STRIP.AUTO-MCNC: NEGATIVE MG/DL
HCT VFR BLD AUTO: 41.8 % (ref 36–48)
HGB BLD-MCNC: 13.9 G/DL (ref 12–16)
HGB UR QL STRIP.AUTO: NEGATIVE
KETONES UR STRIP.AUTO-MCNC: NEGATIVE MG/DL
LACTATE BLDV-SCNC: 2.7 MMOL/L (ref 0.4–1.9)
LACTATE BLDV-SCNC: 2.9 MMOL/L (ref 0.4–1.9)
LEUKOCYTE ESTERASE UR QL STRIP.AUTO: NEGATIVE
LIPASE SERPL-CCNC: 15 U/L (ref 13–60)
LYMPHOCYTES # BLD: 0.6 K/UL (ref 1–5.1)
LYMPHOCYTES NFR BLD: 3.9 %
MAGNESIUM SERPL-MCNC: 2.18 MG/DL (ref 1.8–2.4)
MAGNESIUM SERPL-MCNC: 2.21 MG/DL (ref 1.8–2.4)
MCH RBC QN AUTO: 31.4 PG (ref 26–34)
MCHC RBC AUTO-ENTMCNC: 33.3 G/DL (ref 31–36)
MCV RBC AUTO: 94.3 FL (ref 80–100)
MONOCYTES # BLD: 0.7 K/UL (ref 0–1.3)
MONOCYTES NFR BLD: 4.6 %
NEUTROPHILS # BLD: 14.8 K/UL (ref 1.7–7.7)
NEUTROPHILS NFR BLD: 90.6 %
NITRITE UR QL STRIP.AUTO: NEGATIVE
PH UR STRIP.AUTO: 7.5 [PH] (ref 5–8)
PLATELET # BLD AUTO: 252 K/UL (ref 135–450)
PMV BLD AUTO: 8.3 FL (ref 5–10.5)
POTASSIUM SERPL-SCNC: 2.7 MMOL/L (ref 3.5–5.1)
POTASSIUM SERPL-SCNC: 3.2 MMOL/L (ref 3.5–5.1)
PROCALCITONIN SERPL IA-MCNC: 0.05 NG/ML (ref 0–0.15)
PROT SERPL-MCNC: 7.5 G/DL (ref 6.4–8.2)
PROT UR STRIP.AUTO-MCNC: NEGATIVE MG/DL
RBC # BLD AUTO: 4.43 M/UL (ref 4–5.2)
SODIUM SERPL-SCNC: 140 MMOL/L (ref 136–145)
SP GR UR STRIP.AUTO: 1.01 (ref 1–1.03)
TROPONIN, HIGH SENSITIVITY: 24 NG/L (ref 0–14)
TROPONIN, HIGH SENSITIVITY: 28 NG/L (ref 0–14)
TROPONIN, HIGH SENSITIVITY: 31 NG/L (ref 0–14)
TROPONIN, HIGH SENSITIVITY: 32 NG/L (ref 0–14)
UA COMPLETE W REFLEX CULTURE PNL UR: NORMAL
UA DIPSTICK W REFLEX MICRO PNL UR: NORMAL
URN SPEC COLLECT METH UR: NORMAL
UROBILINOGEN UR STRIP-ACNC: 0.2 E.U./DL
WBC # BLD AUTO: 16.3 K/UL (ref 4–11)

## 2024-11-26 PROCEDURE — 96375 TX/PRO/DX INJ NEW DRUG ADDON: CPT

## 2024-11-26 PROCEDURE — 87186 SC STD MICRODIL/AGAR DIL: CPT

## 2024-11-26 PROCEDURE — 6370000000 HC RX 637 (ALT 250 FOR IP): Performed by: INTERNAL MEDICINE

## 2024-11-26 PROCEDURE — 71045 X-RAY EXAM CHEST 1 VIEW: CPT

## 2024-11-26 PROCEDURE — 93010 ELECTROCARDIOGRAM REPORT: CPT | Performed by: INTERNAL MEDICINE

## 2024-11-26 PROCEDURE — 2060000000 HC ICU INTERMEDIATE R&B

## 2024-11-26 PROCEDURE — 2580000003 HC RX 258: Performed by: INTERNAL MEDICINE

## 2024-11-26 PROCEDURE — 80053 COMPREHEN METABOLIC PANEL: CPT

## 2024-11-26 PROCEDURE — 96366 THER/PROPH/DIAG IV INF ADDON: CPT

## 2024-11-26 PROCEDURE — 99223 1ST HOSP IP/OBS HIGH 75: CPT | Performed by: INTERNAL MEDICINE

## 2024-11-26 PROCEDURE — 83605 ASSAY OF LACTIC ACID: CPT

## 2024-11-26 PROCEDURE — 96368 THER/DIAG CONCURRENT INF: CPT

## 2024-11-26 PROCEDURE — 87040 BLOOD CULTURE FOR BACTERIA: CPT

## 2024-11-26 PROCEDURE — 84132 ASSAY OF SERUM POTASSIUM: CPT

## 2024-11-26 PROCEDURE — 81003 URINALYSIS AUTO W/O SCOPE: CPT

## 2024-11-26 PROCEDURE — 93005 ELECTROCARDIOGRAM TRACING: CPT | Performed by: EMERGENCY MEDICINE

## 2024-11-26 PROCEDURE — 84484 ASSAY OF TROPONIN QUANT: CPT

## 2024-11-26 PROCEDURE — 85025 COMPLETE CBC W/AUTO DIFF WBC: CPT

## 2024-11-26 PROCEDURE — 99285 EMERGENCY DEPT VISIT HI MDM: CPT

## 2024-11-26 PROCEDURE — 6360000004 HC RX CONTRAST MEDICATION: Performed by: EMERGENCY MEDICINE

## 2024-11-26 PROCEDURE — 74176 CT ABD & PELVIS W/O CONTRAST: CPT

## 2024-11-26 PROCEDURE — 36556 INSERT NON-TUNNEL CV CATH: CPT

## 2024-11-26 PROCEDURE — 6360000002 HC RX W HCPCS: Performed by: EMERGENCY MEDICINE

## 2024-11-26 PROCEDURE — 87150 DNA/RNA AMPLIFIED PROBE: CPT

## 2024-11-26 PROCEDURE — 6360000002 HC RX W HCPCS: Performed by: INTERNAL MEDICINE

## 2024-11-26 PROCEDURE — 94761 N-INVAS EAR/PLS OXIMETRY MLT: CPT

## 2024-11-26 PROCEDURE — 74174 CTA ABD&PLVS W/CONTRAST: CPT

## 2024-11-26 PROCEDURE — 84145 PROCALCITONIN (PCT): CPT

## 2024-11-26 PROCEDURE — 06HY33Z INSERTION OF INFUSION DEVICE INTO LOWER VEIN, PERCUTANEOUS APPROACH: ICD-10-PCS | Performed by: EMERGENCY MEDICINE

## 2024-11-26 PROCEDURE — 83690 ASSAY OF LIPASE: CPT

## 2024-11-26 PROCEDURE — 2580000003 HC RX 258: Performed by: EMERGENCY MEDICINE

## 2024-11-26 PROCEDURE — 36415 COLL VENOUS BLD VENIPUNCTURE: CPT

## 2024-11-26 PROCEDURE — 2700000000 HC OXYGEN THERAPY PER DAY

## 2024-11-26 PROCEDURE — 2500000003 HC RX 250 WO HCPCS: Performed by: EMERGENCY MEDICINE

## 2024-11-26 PROCEDURE — 96365 THER/PROPH/DIAG IV INF INIT: CPT

## 2024-11-26 PROCEDURE — 87641 MR-STAPH DNA AMP PROBE: CPT

## 2024-11-26 PROCEDURE — 83735 ASSAY OF MAGNESIUM: CPT

## 2024-11-26 RX ORDER — ONDANSETRON 2 MG/ML
4 INJECTION INTRAMUSCULAR; INTRAVENOUS EVERY 6 HOURS PRN
Status: CANCELLED | OUTPATIENT
Start: 2024-11-26

## 2024-11-26 RX ORDER — GABAPENTIN 100 MG/1
100 CAPSULE ORAL 3 TIMES DAILY
Status: CANCELLED | OUTPATIENT
Start: 2024-11-26

## 2024-11-26 RX ORDER — ALBUTEROL SULFATE 90 UG/1
2 INHALANT RESPIRATORY (INHALATION) EVERY 6 HOURS PRN
Status: DISCONTINUED | OUTPATIENT
Start: 2024-11-26 | End: 2024-12-06 | Stop reason: HOSPADM

## 2024-11-26 RX ORDER — LOPERAMIDE HYDROCHLORIDE 2 MG/1
2 CAPSULE ORAL 4 TIMES DAILY PRN
COMMUNITY

## 2024-11-26 RX ORDER — POLYETHYLENE GLYCOL 3350 17 G/17G
17 POWDER, FOR SOLUTION ORAL DAILY
COMMUNITY

## 2024-11-26 RX ORDER — NITROGLYCERIN 0.4 MG/1
0.4 TABLET SUBLINGUAL EVERY 5 MIN PRN
Status: DISCONTINUED | OUTPATIENT
Start: 2024-11-26 | End: 2024-12-06 | Stop reason: HOSPADM

## 2024-11-26 RX ORDER — ALPRAZOLAM 0.25 MG/1
0.5 TABLET ORAL NIGHTLY
Status: DISCONTINUED | OUTPATIENT
Start: 2024-11-26 | End: 2024-11-27

## 2024-11-26 RX ORDER — 0.9 % SODIUM CHLORIDE 0.9 %
500 INTRAVENOUS SOLUTION INTRAVENOUS ONCE
Status: COMPLETED | OUTPATIENT
Start: 2024-11-26 | End: 2024-11-26

## 2024-11-26 RX ORDER — ONDANSETRON 2 MG/ML
4 INJECTION INTRAMUSCULAR; INTRAVENOUS ONCE
Status: COMPLETED | OUTPATIENT
Start: 2024-11-26 | End: 2024-11-26

## 2024-11-26 RX ORDER — SODIUM CHLORIDE 9 MG/ML
INJECTION, SOLUTION INTRAVENOUS PRN
Status: DISCONTINUED | OUTPATIENT
Start: 2024-11-26 | End: 2024-12-06 | Stop reason: HOSPADM

## 2024-11-26 RX ORDER — BUMETANIDE 1 MG/1
2 TABLET ORAL DAILY
Status: DISCONTINUED | OUTPATIENT
Start: 2024-11-26 | End: 2024-12-02

## 2024-11-26 RX ORDER — VANCOMYCIN 1.75 G/350ML
1250 INJECTION, SOLUTION INTRAVENOUS EVERY 24 HOURS
Status: DISCONTINUED | OUTPATIENT
Start: 2024-11-27 | End: 2024-11-27 | Stop reason: SDUPTHER

## 2024-11-26 RX ORDER — SODIUM CHLORIDE 9 MG/ML
INJECTION, SOLUTION INTRAVENOUS CONTINUOUS
Status: DISCONTINUED | OUTPATIENT
Start: 2024-11-26 | End: 2024-11-27

## 2024-11-26 RX ORDER — POTASSIUM CHLORIDE 7.45 MG/ML
10 INJECTION INTRAVENOUS
Status: COMPLETED | OUTPATIENT
Start: 2024-11-26 | End: 2024-11-26

## 2024-11-26 RX ORDER — SODIUM CHLORIDE AND POTASSIUM CHLORIDE 300; 900 MG/100ML; MG/100ML
INJECTION, SOLUTION INTRAVENOUS CONTINUOUS
Status: DISCONTINUED | OUTPATIENT
Start: 2024-11-26 | End: 2024-11-26

## 2024-11-26 RX ORDER — METOLAZONE 2.5 MG/1
2.5 TABLET ORAL SEE ADMIN INSTRUCTIONS
Status: ON HOLD | COMMUNITY
Start: 2024-09-30 | End: 2024-12-06 | Stop reason: HOSPADM

## 2024-11-26 RX ORDER — POTASSIUM CHLORIDE 1500 MG/1
20 TABLET, EXTENDED RELEASE ORAL ONCE
Status: COMPLETED | OUTPATIENT
Start: 2024-11-26 | End: 2024-11-26

## 2024-11-26 RX ORDER — ONDANSETRON 4 MG/1
4 TABLET, ORALLY DISINTEGRATING ORAL EVERY 8 HOURS PRN
Status: CANCELLED | OUTPATIENT
Start: 2024-11-26

## 2024-11-26 RX ORDER — POTASSIUM CHLORIDE 1500 MG/1
40 TABLET, EXTENDED RELEASE ORAL ONCE
Status: COMPLETED | OUTPATIENT
Start: 2024-11-26 | End: 2024-11-26

## 2024-11-26 RX ORDER — HYDROCODONE BITARTRATE AND ACETAMINOPHEN 5; 325 MG/1; MG/1
1 TABLET ORAL EVERY 6 HOURS PRN
Status: CANCELLED | OUTPATIENT
Start: 2024-11-26

## 2024-11-26 RX ORDER — SODIUM CHLORIDE 0.9 % (FLUSH) 0.9 %
5-40 SYRINGE (ML) INJECTION PRN
Status: DISCONTINUED | OUTPATIENT
Start: 2024-11-26 | End: 2024-12-06 | Stop reason: HOSPADM

## 2024-11-26 RX ORDER — ASPIRIN 81 MG/1
81 TABLET, CHEWABLE ORAL DAILY
Status: DISCONTINUED | OUTPATIENT
Start: 2024-11-27 | End: 2024-12-06 | Stop reason: HOSPADM

## 2024-11-26 RX ORDER — IBUPROFEN 400 MG/1
400 TABLET, FILM COATED ORAL EVERY 6 HOURS PRN
Status: ON HOLD | COMMUNITY
End: 2024-12-06 | Stop reason: HOSPADM

## 2024-11-26 RX ORDER — ATORVASTATIN CALCIUM 40 MG/1
40 TABLET, FILM COATED ORAL NIGHTLY
Status: DISCONTINUED | OUTPATIENT
Start: 2024-11-26 | End: 2024-12-06 | Stop reason: HOSPADM

## 2024-11-26 RX ORDER — PANTOPRAZOLE SODIUM 40 MG/1
40 TABLET, DELAYED RELEASE ORAL DAILY
Status: DISCONTINUED | OUTPATIENT
Start: 2024-11-26 | End: 2024-11-28

## 2024-11-26 RX ORDER — IOPAMIDOL 755 MG/ML
75 INJECTION, SOLUTION INTRAVASCULAR
Status: COMPLETED | OUTPATIENT
Start: 2024-11-26 | End: 2024-11-26

## 2024-11-26 RX ORDER — MAGNESIUM SULFATE IN WATER 40 MG/ML
2000 INJECTION, SOLUTION INTRAVENOUS ONCE
Status: DISCONTINUED | OUTPATIENT
Start: 2024-11-26 | End: 2024-11-26

## 2024-11-26 RX ORDER — ACETAMINOPHEN 325 MG/1
650 TABLET ORAL EVERY 6 HOURS PRN
Status: DISCONTINUED | OUTPATIENT
Start: 2024-11-26 | End: 2024-12-06 | Stop reason: HOSPADM

## 2024-11-26 RX ORDER — FENTANYL CITRATE 50 UG/ML
25 INJECTION, SOLUTION INTRAMUSCULAR; INTRAVENOUS ONCE
Status: COMPLETED | OUTPATIENT
Start: 2024-11-26 | End: 2024-11-26

## 2024-11-26 RX ORDER — OXYCODONE HYDROCHLORIDE 5 MG/1
5 TABLET ORAL EVERY 6 HOURS PRN
Status: DISCONTINUED | OUTPATIENT
Start: 2024-11-26 | End: 2024-12-02

## 2024-11-26 RX ORDER — SODIUM CHLORIDE 0.9 % (FLUSH) 0.9 %
5-40 SYRINGE (ML) INJECTION EVERY 12 HOURS SCHEDULED
Status: DISCONTINUED | OUTPATIENT
Start: 2024-11-26 | End: 2024-12-06 | Stop reason: HOSPADM

## 2024-11-26 RX ORDER — ACETAMINOPHEN 650 MG/1
650 SUPPOSITORY RECTAL EVERY 6 HOURS PRN
Status: DISCONTINUED | OUTPATIENT
Start: 2024-11-26 | End: 2024-12-06 | Stop reason: HOSPADM

## 2024-11-26 RX ORDER — ENOXAPARIN SODIUM 100 MG/ML
30 INJECTION SUBCUTANEOUS 2 TIMES DAILY
Status: DISCONTINUED | OUTPATIENT
Start: 2024-11-27 | End: 2024-12-06 | Stop reason: HOSPADM

## 2024-11-26 RX ADMIN — CEFEPIME 2000 MG: 2 INJECTION, POWDER, FOR SOLUTION INTRAVENOUS at 15:10

## 2024-11-26 RX ADMIN — SODIUM CHLORIDE: 9 INJECTION, SOLUTION INTRAVENOUS at 14:20

## 2024-11-26 RX ADMIN — SODIUM CHLORIDE 500 ML: 9 INJECTION, SOLUTION INTRAVENOUS at 08:33

## 2024-11-26 RX ADMIN — VANCOMYCIN HYDROCHLORIDE 2500 MG: 1 INJECTION, POWDER, LYOPHILIZED, FOR SOLUTION INTRAVENOUS at 17:06

## 2024-11-26 RX ADMIN — POTASSIUM CHLORIDE 10 MEQ: 7.45 INJECTION INTRAVENOUS at 11:46

## 2024-11-26 RX ADMIN — POTASSIUM CHLORIDE 10 MEQ: 7.45 INJECTION INTRAVENOUS at 13:14

## 2024-11-26 RX ADMIN — FENTANYL CITRATE 25 MCG: 50 INJECTION INTRAMUSCULAR; INTRAVENOUS at 09:02

## 2024-11-26 RX ADMIN — AMIODARONE HYDROCHLORIDE 150 MG: 1.5 INJECTION, SOLUTION INTRAVENOUS at 09:07

## 2024-11-26 RX ADMIN — ACETAMINOPHEN 650 MG: 325 TABLET ORAL at 23:37

## 2024-11-26 RX ADMIN — POTASSIUM CHLORIDE AND SODIUM CHLORIDE: 900; 300 INJECTION, SOLUTION INTRAVENOUS at 09:30

## 2024-11-26 RX ADMIN — BUMETANIDE 2 MG: 1 TABLET ORAL at 22:15

## 2024-11-26 RX ADMIN — SODIUM CHLORIDE, PRESERVATIVE FREE 10 ML: 5 INJECTION INTRAVENOUS at 22:16

## 2024-11-26 RX ADMIN — POTASSIUM CHLORIDE 40 MEQ: 1500 TABLET, EXTENDED RELEASE ORAL at 19:01

## 2024-11-26 RX ADMIN — PANTOPRAZOLE SODIUM 40 MG: 40 TABLET, DELAYED RELEASE ORAL at 22:15

## 2024-11-26 RX ADMIN — ACETAMINOPHEN 650 MG: 325 TABLET ORAL at 16:21

## 2024-11-26 RX ADMIN — POTASSIUM CHLORIDE 10 MEQ: 7.45 INJECTION INTRAVENOUS at 14:17

## 2024-11-26 RX ADMIN — IOPAMIDOL 75 ML: 755 INJECTION, SOLUTION INTRAVENOUS at 12:27

## 2024-11-26 RX ADMIN — POTASSIUM CHLORIDE 10 MEQ: 7.45 INJECTION INTRAVENOUS at 10:57

## 2024-11-26 RX ADMIN — ATORVASTATIN CALCIUM 40 MG: 40 TABLET, FILM COATED ORAL at 22:16

## 2024-11-26 RX ADMIN — POTASSIUM CHLORIDE 20 MEQ: 1500 TABLET, EXTENDED RELEASE ORAL at 22:15

## 2024-11-26 RX ADMIN — MAGNESIUM SULFATE HEPTAHYDRATE 2000 MG: 40 INJECTION, SOLUTION INTRAVENOUS at 09:16

## 2024-11-26 RX ADMIN — CEFEPIME 2000 MG: 2 INJECTION, POWDER, FOR SOLUTION INTRAVENOUS at 22:21

## 2024-11-26 RX ADMIN — ONDANSETRON 4 MG: 2 INJECTION INTRAMUSCULAR; INTRAVENOUS at 08:34

## 2024-11-26 ASSESSMENT — LIFESTYLE VARIABLES
HOW OFTEN DO YOU HAVE A DRINK CONTAINING ALCOHOL: NEVER
HOW MANY STANDARD DRINKS CONTAINING ALCOHOL DO YOU HAVE ON A TYPICAL DAY: PATIENT DOES NOT DRINK

## 2024-11-26 ASSESSMENT — PAIN DESCRIPTION - LOCATION
LOCATION: CHEST;ABDOMEN
LOCATION: HEAD
LOCATION: HEAD

## 2024-11-26 ASSESSMENT — PAIN SCALES - GENERAL
PAINLEVEL_OUTOF10: 0
PAINLEVEL_OUTOF10: 8
PAINLEVEL_OUTOF10: 7
PAINLEVEL_OUTOF10: 3

## 2024-11-26 ASSESSMENT — PAIN - FUNCTIONAL ASSESSMENT: PAIN_FUNCTIONAL_ASSESSMENT: PREVENTS OR INTERFERES SOME ACTIVE ACTIVITIES AND ADLS

## 2024-11-26 ASSESSMENT — PAIN DESCRIPTION - DESCRIPTORS
DESCRIPTORS: ACHING
DESCRIPTORS: ACHING

## 2024-11-26 NOTE — CARE COORDINATION
Case Management Assessment  Initial Evaluation    Date/Time of Evaluation: 11/26/2024 1:15 PM  Assessment Completed by: DEANDRE Patrick    If patient is discharged prior to next notation, then this note serves as note for discharge by case management.    Patient Name: Annie Singleton                   YOB: 1940  Diagnosis: No admission diagnoses are documented for this encounter.                   Date / Time: 11/26/2024  7:43 AM    Patient Admission Status: Emergency   Readmission Risk (Low < 19, Mod (19-27), High > 27): No data recorded  Current PCP: Zachary Grimm MD  PCP verified by CM? (P) Yes    Chart Reviewed: Yes      History Provided by: (P) Patient  Patient Orientation: (P) Alert and Oriented    Patient Cognition: (P) Alert    Hospitalization in the last 30 days (Readmission):  No    If yes, Readmission Assessment in  Navigator will be completed.    Advance Directives:      Code Status: Prior   Patient's Primary Decision Maker is: (P) Legal Next of Kin    Primary Decision Maker: Renae Singleton  [place] - [phone]    Discharge Planning:    Patient lives with: (P) Other (Comment) ([facility]) Type of Home: (P) Skilled Nursing Facility  Primary Care Giver: (P) Family  Patient Support Systems include: (P) Children, Family Members   Current Financial resources: (P) None  Current community resources: (P) ECF/Home Care  Current services prior to admission: (P) Skilled Nursing Facility            Current DME:              Type of Home Care services:  (P) None    ADLS  Prior functional level: (P) Assistance with the following:, Bathing, Dressing, Toileting, Cooking, Housework, Shopping, Mobility  Current functional level: (P) Assistance with the following:, Bathing, Dressing, Toileting, Cooking, Housework, Shopping, Mobility    PT AM-PAC:   /24  OT AM-PAC:   /24    Family can provide assistance at DC: (P) Yes  Would you like Case Management to discuss the discharge plan with

## 2024-11-26 NOTE — H&P
Hospital Medicine History & Physical      Date of Admission: 11/26/2024    Date of Service:  Pt seen/examined on 11/26/2024    Admitted to Inpatient with expected LOS greater than two midnights due to medical therapy.  Placed in Observation status.    Chief Admission Complaint: Chest pain and abdominal pain    Presenting Admission History:      84 y.o. female who presented to [facility] with above complaint.  PMHx significant for legally blind, depression, anxiety atrial fibrillation GERD hypertension bronchial asthma.  She lives in nursing home  Developed chest pain and epigastric abdominal pain this morning  Chest pain was in mid chest, 7 in intensity not associated nausea vomiting or shortness of breath.  She has epigastric and left upper quadrant pain which started the same time with a chest pain  Currently she does not have chest pain or abdominal pain  On 6 L oxygen, she told she had cough yesterday not sure about the sputum, no fever  No change in mental status or any focal neurological symptoms  She denies dizziness or syncope or palpitation   she normally walks with a walker.    She has poor short-term memory   History is from patient ER physician and daughters  Assessment/Plan:      Current Principal Problem:  Chest pain    Chest pain elevated troponin QT prolongation-and nonsustained ventricular tachycardia   admit to progressive care unit telemetry trend troponin nitro as needed cardiology consulted from the emergency room  Hold atenolol, Celexa  Aspirin statin  Cardiology has some concern about Norco and QT prolongation-hold Norco  Tylenol as needed  Keep potassium and magnesium within normal limits  Discussed with cardiology  opioids can cause QT prolongation), Avoid medications that cause QT prolongation   get medication list from the nursing home, patient had received Zofran in the emergency room upon arrival  EKG in the morning  Ischemic evaluation deferred to cardiology    Abdominal pain  CAT  (HCC)     Diverticula of colon 10/12/2015    GERD (gastroesophageal reflux disease)     Hypertension     IBS (irritable bowel syndrome) 2010       Past Surgical History:        Procedure Laterality Date    COLONOSCOPY      1994 OCT 12,2015 (diverticulosis)    HYSTERECTOMY (CERVIX STATUS UNKNOWN)      JOINT REPLACEMENT      PATRICIO TKR    KNEE SURGERY         Medications Prior to Admission:   Prior to Admission medications    Medication Sig Start Date End Date Taking? Authorizing Provider   metOLazone (ZAROXOLYN) 2.5 MG tablet Take 1 tablet by mouth See Admin Instructions Every Monday & Thursday 9/30/24  Yes Yao Weaver MD   polyethylene glycol (GLYCOLAX) 17 g packet Take 1 packet by mouth daily   Yes Yao Weaver MD   Sodium Phosphates (FLEET ENEMA RE) Place rectally as needed   Yes Yao Weaver MD   ibuprofen (ADVIL;MOTRIN) 400 MG tablet Take 1 tablet by mouth every 6 hours as needed for Pain   Yes Yao Weaver MD   loperamide (IMODIUM) 2 MG capsule Take 1 capsule by mouth 4 times daily as needed for Diarrhea   Yes Yao Weaver MD   acetaminophen (TYLENOL) 325 MG tablet Take 2 tablets by mouth every 6 hours as needed   Yes Yao Weaver MD   LACTOBACILLUS PO Take by mouth   Yes Yao Weaver MD   Cranberry-Vitamin C-Probiotic (AZO CRANBERRY PO) Take by mouth   Yes Yao Weaver MD   Calcium Carb-Cholecalciferol (CALCIUM + D3 PO) Take by mouth   Yes Yao Weaver MD   loratadine (CLARITIN) 10 MG capsule Take 1 capsule by mouth daily   Yes Yao Weaver MD   meclizine (ANTIVERT) 25 MG tablet Take 1 tablet by mouth 3 times daily as needed   Yes Yao Weaver MD   melatonin 3 MG TABS tablet Take 5 mg by mouth as needed   Yes Yao Weaver MD   Multiple Vitamin (MULTIVITAMIN ADULT PO) Take by mouth   Yes Yao Weaver MD   HYDROcodone-acetaminophen (NORCO) 5-325 MG per tablet Take 1 tablet by mouth every 6 hours as

## 2024-11-26 NOTE — ED NOTES
Pts bedside monitor alarmed V-tach. Went to CT to check on pt. Pt alert and talking on table. CT states pts PIV infiltrated. Asked MATHEW Oneal to try with US. Kimmy to CT who came back and stated IR is trying.

## 2024-11-26 NOTE — ED NOTES
Spoke to the nurse at [facility] per Electrophysiology request. Pt received one dose of Kitzmiller at 2258 last night. Pt is not on Zofran and has not received any Zofran at the facility. All meds administered updated on the Med Rec.

## 2024-11-26 NOTE — CONSULTS
Pharmacy Note  Vancomycin Consult    Annie Singleton is a 84 y.o. female started on vancomycin for pneumonia. Pharmacy consult received from Dr. Augustine to manage vancomycin therapy. Patient is also receiving the following antibiotics: cefepime.    Allergies: Adhesive tape, Erythromycin, and Pcn [penicillins]     Tmax: 97.5 deg F    Micro: blood culture sent    Imagin24 CTA A/P- 1. No evidence of mesenteric ischemia.  No evidence of jeopardized bowel. 2. Left lower lobe airspace opacity concerning for pneumonia. 3. Small contusion seen overlying the posterior back. 4. Incidental findings as discussed above.     Recent Labs     24  0805   CREATININE 1.4*   WBC 16.3*     Estimated Creatinine Clearance: 38 mL/min (A) (based on SCr of 1.4 mg/dL (H)).    Intake/Output Summary (Last 24 hours) at 2024 1701  Last data filed at 2024 1554  Gross per 24 hour   Intake 989.94 ml   Output 200 ml   Net 789.94 ml     Wt Readings from Last 1 Encounters:   24 113.4 kg (250 lb)       Body mass index is 41.6 kg/m².    Goal Vancomycin trough: 15-20 mcg/mL   Goal Vancomycin AUC: 400-600     Assessment/Plan:  Vancomycin was initiated in the ED with a one time loading dose of 2500 mg (22 mg/kg).  Will continue with vancomycin 1250 mg (11 mg/kg) IV every 24 hours.   Calculated vancomycin AUC = 591 mg/L*h with an estimated steady-state vancomycin trough = 15.9 mcg/mL.   Vancomycin random level ordered for  @ 6 AM.   Labs: BMP and CBC tomorrow morning  Future dosing adjustments and levels will be determined based on culture results, renal function, and clinical response.   Pharmacy will continue to monitor.     Thank you for the consult!    Aurora Russ, PharmD Candidate  2024 5:42 PM    Vancomycin Day of Therapy 2  Indication: pna  Micro: ngtd  Current Dosing Method: Bayesian-Guided AUC Dosing  Therapeutic Goal: -600 mg/L*hr  Recent Labs     24  0805 24  0500

## 2024-11-26 NOTE — PROGRESS NOTES
Dr. Augustine called to notified RN to not let zofran be ordered. Tried to update her on the pt but she did not want to be updated and to notify night time dr. Night time   Already notified. Will continue to advocate for pt.

## 2024-11-26 NOTE — ED NOTES
Med rec completed with list provided by Coral Daniels. All information thought to be true and accurate.

## 2024-11-26 NOTE — ED NOTES
Called CT to see if they were ready for this RN to bring pt over. States they have another pt and then they will call to notify when they are ready.

## 2024-11-26 NOTE — TELEPHONE ENCOUNTER
Please inform patient echocardiogram showed normal LV function.      Schedule hospital follow-up visit once patient is discharge home.

## 2024-11-26 NOTE — ED NOTES
Pt with loss of PIV access. IR attempted new line without success. Dr. Yo at bedside placing central line.

## 2024-11-26 NOTE — ED NOTES
Annie Singleton is a 84 y.o. female admitted for  Principal Problem:    Chest pain  Resolved Problems:    * No resolved hospital problems. *  .   Patient SNF LTC via EMS transportation with   Chief Complaint   Patient presents with    Chest Pain     Pt from [facility]..pt is dnr-cc..Pt to er with cp , but pointing more towards belly. Pt is in afib with hx of same.. per squad pt was taken off thinner for a procedure.    Abdominal Pain   .  Patient is alert and oriented to Person, Place, and Situation  Patient's baseline mobility: Baseline Mobility: Walker  Code Status: Prior   Cardiac Rhythm: Cardiac Rhythm: Atrial fib, Non-Sustained V tach  O2 Flow Rate (L/min): 6 L/min  Is patient on baseline Oxygen: no how many Liters:   Abnormal Assessment Findings: hypoxia, irregular heart rhythm (a.fib at baseline), non-sustained v.tach, scattered bruising    Isolation: None      NIH Score:    C-SSRS: Risk of Suicide: No Risk  Bedside swallow:        Active LDA's:    Patient admitted with a flores: no If the flores is chronic was it exchanged:NA  Reason for flores:   Patient admitted with Central Line: groin CVC PICC line placement confirmed:  Reason for Central line: Hemodynamically unstable  Was central line Inserted from an outside facility: no       Family/Caregiver Present yes Any Concerns: family is very involved in patient's care   Restraints no  Sitter no         Vitals: MEWS Score: 2    Vitals:    11/26/24 1506 11/26/24 1524 11/26/24 1551 11/26/24 1606   BP: 118/64 117/66 113/63 (!) 109/53   Pulse: 65 73 60 72   Resp: 21 17 13 14   Temp:       SpO2: 99% 98% 98% 97%   Weight:       Height:           Last documented pain score (0-10 scale) Pain Level: 7  Pain medication administered Yes- see MAR.    Pertinent or High Risk Medications/Drips: No.    Pending Blood Product Administration: no    Abnormal labs:   Abnormal Labs Reviewed   CBC WITH AUTO DIFFERENTIAL - Abnormal; Notable for the following components:        for pain in ED, as charted. Family at bedside. Patient is on 5L of oxygen via nasal cannula, does not wear oxygen at baseline. Patient is alert and oriented to self, place, and situation, with intermittent moments of confusion.   If any further questions, please call Sending RN at [phone]

## 2024-11-26 NOTE — ED NOTES
Pt with sustained V-tach and went unresponsive for about 30 seconds. Dr. Yo to bedside - no interventions as pt is a DNR-CC. Pt regained consciousness and is now speaking with family at bedside.

## 2024-11-26 NOTE — ED NOTES
Pt with repeated episodes of non-sustained V-tach. Pt remains alert and talking. Dr. Yo to CT - no interventions at this time as pt is a DNR-CC. IR unable to obtain PIV access. Taking pt back to ED room 3 for MD to place central line.

## 2024-11-26 NOTE — ED PROVIDER NOTES
[facility]  ED     EMERGENCY DEPARTMENT ENCOUNTER            Pt Name: Annie Singleton   MRN: [id number]   Birthdate 1940   Date of evaluation: 11/26/2024   Provider: Jeffrey Yo MD   PCP: Zachary Grimm MD   Note Started: 7:56 AM EST 11/26/24          CHIEF COMPLAINT     Chief Complaint   Patient presents with    Chest Pain     Pt from [facility]..pt is dnr-cc..Pt to er with cp , but pointing more towards belly. Pt is in afib with hx of same.. per squad pt was taken off thinner for a procedure.    Abdominal Pain             HISTORY OF PRESENT ILLNESS:   History from : Patient   Limitations to history : None     Annie Singleton is a 84 y.o. female who presents complaining of abdominal pain.  The patient has a past medical history of atrial fibrillation.  She states she had sudden onset of abdominal pain approximately 1 hour prior to arrival.  She denies any known history of abdominal aortic aneurysm.  She reports that her pain is in her lower chest/upper abdomen.  It is localized.  No radiation.  No nausea or vomiting.  She states that the pain is sharp causing her to have some shortness of breath.  She denies any prior episodes of pain similar to this.  She states she recently underwent a cardiac procedure (echo) that was OK.    Nursing Notes were all reviewed and agreed with, or any disagreements were addressed in the HPI.     REVIEW OF SYSTEMS :    Review of Systems   Constitutional:  Negative for fever.   HENT:  Negative for rhinorrhea and sore throat.    Eyes:  Negative for redness.   Respiratory:  Positive for shortness of breath.    Cardiovascular:  Positive for chest pain.   Gastrointestinal:  Positive for abdominal pain.   Genitourinary:  Negative for flank pain.   Neurological:  Negative for headaches.   Hematological:  Negative for adenopathy.   Psychiatric/Behavioral:  Negative for confusion.         MEDICAL HISTORY   has a past medical history of  patient's potassium.  We empirically started the patient the patient's magnesium however her magnesium came back okay so we held on the magnesium replacement.       Patient was given the following medications:   Medications   0.9 % sodium chloride infusion ( IntraVENous New Bag 11/26/24 1420)   ceFEPIme (MAXIPIME) 2,000 mg in sodium chloride 0.9 % 100 mL IVPB (mini-bag) (has no administration in time range)   sodium chloride 0.9 % bolus 500 mL (0 mLs IntraVENous Stopped 11/26/24 0900)   ondansetron (ZOFRAN) injection 4 mg (4 mg IntraVENous Given 11/26/24 0834)   fentaNYL (SUBLIMAZE) injection 25 mcg (25 mcg IntraVENous Given 11/26/24 0902)   iopamidol (ISOVUE-370) 76 % injection 75 mL (75 mLs IntraVENous Given 11/26/24 1227)   potassium chloride 10 mEq/100 mL IVPB (Peripheral Line) (10 mEq IntraVENous New Bag 11/26/24 1417)        CONSULTS: (Who and What was discussed)  IP CONSULT TO CARDIOLOGY  IP CONSULT TO CARDIOLOGY    Discussion with Other Profesionals : Admitting Team and cardiology    Social Determinants : None    Records Reviewed : None    Chronic Conditions:   has a past medical history of Arrhythmia, Arthritis, Asthma, Atrial fibrillation (HCC), Diverticula of colon (10/12/2015), GERD (gastroesophageal reflux disease), Hypertension, and IBS (irritable bowel syndrome) (2010).        Disposition Considerations:    I am the Primary Clinician of Record.        FINAL IMPRESSION    1. Nonsustained ventricular tachycardia (HCC)    2. Elevated troponin    3. QT prolongation    4. Leukocytosis, unspecified type    5. Pneumonia due to infectious organism, unspecified laterality, unspecified part of lung           DISPOSITION/PLAN     PATIENT REFERRED TO:   [facility]  [address] [address]  [phone]           DISCHARGE MEDICATIONS:   New Prescriptions    No medications on file        DISCONTINUED MEDICATIONS:   Discontinued Medications    No medications on

## 2024-11-26 NOTE — TELEPHONE ENCOUNTER
Pt daughter Renae called to advise that pt was taken to [facility] ED today 11/26/24.  While on the phone, Renae asked if [facility] had gotten the ECHO results yet.  Please contact Renae to discuss Echo results.

## 2024-11-26 NOTE — CONSULTS
[address] [place] [address]                              CONSULTATION      PATIENT NAME: MONET COREA            : 1940  MED REC NO: [id number]                      ROOM: 03  ACCOUNT NO: [id number]                       ADMIT DATE: 2024  PROVIDER: Kasi Mathur MD    CARDIAC ELECTROPHYSIOLOGY CONSULTATION    CONSULT DATE: 2024      REASON FOR CONSULTATION:  Ventricular tachycardia.    HISTORY OF PRESENT ILLNESS:  The patient is an 84-year-old woman with history of persistent atrial fibrillation, osteoarthritis, hypertension, and irritable bowel syndrome, who was admitted for evaluation of abdominal pain.  The patient has a difficult time providing a concise history, but reported to the emergency department staff that she had sudden onset of abdominal pain 1 hour prior to arrival.  She had been seen by Cardiology on 2024.  We are in the process of obtaining her records from [place].  She does have known atrial fibrillation and was recently taken off anticoagulation due to multiple falls.  While in the emergency department, the patient was noted to have a prolonged episode of rapid polymorphic ventricular tachycardia induced with a \"long-short\" cycle length sequence.  She has no known history of ventricular arrhythmias.  Most recent echo from 2024 demonstrates well-preserved left ventricular function with an ejection fraction estimated at 50% to 60%.  Of note, the corrected QT interval on ECG today is about 570 milliseconds.  The corrected QT interval on ECG from 2024 was 420 milliseconds.  Electrolyte evaluation at the time of admission does demonstrate hypokalemia with a potassium level of 3.2.  Magnesium levels were normal.  She does have elevated lactic acid level with leukocytosis.  Abdominal CT demonstrates no evidence for mesenteric anemia.  There is a small contusion over the low  back.  Left lower airspace opacity is noted and is concerning for pneumonia.  The medication list from the nursing home was reviewed, and the only potential pharmacologic explanation for the prolongation of the QT interval is exposure to opioids.  The nursing home was certain that she had not been exposed to Zofran.    PAST MEDICAL HISTORY:    1. Persistent atrial fibrillation.  2. Osteoarthritis.  3. Irritable bowel disease.  4. Gastroesophageal reflux disease.  5. Hypertension.    MEDICATIONS:  At the time of admission include Xanax 0.5 mg p.o. q.h.s., atenolol 25 mg p.o. daily, bumetanide 2 mg p.o. daily, Celexa 40 mg p.o. daily, Neurontin 100 mg p.o. b.i.d., hydrocodone/acetaminophen 5/325 one p.o. q.6 hours as needed, Imodium 2 mg p.o. q.i.d. as needed, Zaroxolyn 2.5 mg p.o. twice per week, Protonix 40 mg p.o. daily, potassium chloride 40 mEq p.o. b.i.d.    ALLERGIES:  INCLUDE ADHESIVE TAPE, ERYTHROMYCIN, AND PENICILLIN.      SOCIAL HISTORY:  The patient is a former smoker and stopped smoking nearly 50 years ago.  She does not consume alcohol at this time.    FAMILY HISTORY:  Remarkable for heart disease in the mother.    REVIEW OF SYSTEMS:  Difficult to obtain.  The patient does not report recent chest pain, fever, or chills.  She has not had palpitations.  She does not have a history of syncope, though does have frequent falls.  All other components of a 14-system review are negative.    PHYSICAL EXAMINATION:  VITAL SIGNS:  Blood pressure is 115/62, heart rate is 70 beats per minute and regular.  The patient is afebrile.  GENERAL:  She is awake and somewhat able to answer questions, though not a concise history.  HEENT:  Demonstrates normocephalic and atraumatic head.  There is no scleral icterus.  NECK:  Supple without thyromegaly.  CARDIOVASCULAR:  Reveals an irregular rhythm.  The apical impulse is discrete.  S1 and S2 are normal.  There is no audible murmur or gallop.  LUNGS:  Clear anteriorly.  ABDOMEN:

## 2024-11-26 NOTE — ACP (ADVANCE CARE PLANNING)
Advance Care Planning     General Advance Care Planning (ACP) Conversation    Date of Conversation: 11/26/2024  Conducted with: Patient with Decision Making Capacity  Other persons present: Daughter      Healthcare Decision Maker:   Primary Decision Maker: Renae Singleton - Florin - [phone]     Today we documented Decision Maker(s) consistent with Legal Next of Kin hierarchy.  Content/Action Overview:  Has ACP document(s) on file - reflects the patient's care preferences  Reviewed DNR/DNI and patient elects DNR order - referred to ACP Clinical Specialist & placed order        Length of Voluntary ACP Conversation in minutes:  <16 minutes (Non-Billable)    DEANDRE Patrick

## 2024-11-26 NOTE — PROGRESS NOTES
4 Eyes Skin Assessment     NAME:  Annie Singleton  YOB: 1940  MEDICAL RECORD NUMBER:  [id number]    The patient is being assessed for  Admission    I agree that at least one RN has performed a thorough Head to Toe Skin Assessment on the patient. ALL assessment sites listed below have been assessed.      Areas assessed by both nurses:    Head, Face, Ears, Shoulders, Back, Chest, Arms, Elbows, Hands, Sacrum. Buttock, Coccyx, Ischium, Legs. Feet and Heels, and Under Medical Devices         Does the Patient have a Wound? Yes wound(s) were present on assessment. LDA wound assessment was Initiated and completed by RN       Ben Prevention initiated by RN: Yes  Wound Care Orders initiated by RN: Yes    Pressure Injury (Stage 3,4, Unstageable, DTI, NWPT, and Complex wounds) if present, place Wound referral order by RN under : Yes  DTI on coccyx/sacrum, Open area btw buttocks, Heels red  New Ostomies, if present place, Ostomy referral order under : No     Nurse 1 eSignature: Electronically signed by Юлия Siddiqui RN on 11/26/24 at 6:00 PM EST    **SHARE this note so that the co-signing nurse can place an eSignature**    Nurse 2 eSignature: Electronically signed by Shaniqua Anaya RN on 11/26/24 at 6:14 PM EST

## 2024-11-27 ENCOUNTER — TELEPHONE (OUTPATIENT)
Dept: CARDIOLOGY CLINIC | Age: 84
End: 2024-11-27

## 2024-11-27 LAB
ANION GAP SERPL CALCULATED.3IONS-SCNC: 12 MMOL/L (ref 3–16)
BUN SERPL-MCNC: 45 MG/DL (ref 7–20)
CALCIUM SERPL-MCNC: 8.5 MG/DL (ref 8.3–10.6)
CHLORIDE SERPL-SCNC: 93 MMOL/L (ref 99–110)
CHOLEST SERPL-MCNC: 138 MG/DL (ref 0–199)
CO2 SERPL-SCNC: 32 MMOL/L (ref 21–32)
CREAT SERPL-MCNC: 1.6 MG/DL (ref 0.6–1.2)
DEPRECATED RDW RBC AUTO: 13.5 % (ref 12.4–15.4)
EKG DIAGNOSIS: NORMAL
EKG Q-T INTERVAL: 590 MS
EKG QRS DURATION: 100 MS
EKG QTC CALCULATION (BAZETT): 590 MS
EKG R AXIS: -26 DEGREES
EKG T AXIS: -22 DEGREES
EKG VENTRICULAR RATE: 60 BPM
GFR SERPLBLD CREATININE-BSD FMLA CKD-EPI: 32 ML/MIN/{1.73_M2}
GLUCOSE SERPL-MCNC: 102 MG/DL (ref 70–99)
HCT VFR BLD AUTO: 35 % (ref 36–48)
HDLC SERPL-MCNC: 52 MG/DL (ref 40–60)
HGB BLD-MCNC: 11.6 G/DL (ref 12–16)
LDLC SERPL CALC-MCNC: 71 MG/DL
MAGNESIUM SERPL-MCNC: 2.25 MG/DL (ref 1.8–2.4)
MCH RBC QN AUTO: 31.6 PG (ref 26–34)
MCHC RBC AUTO-ENTMCNC: 33.1 G/DL (ref 31–36)
MCV RBC AUTO: 95.2 FL (ref 80–100)
MRSA DNA SPEC QL NAA+PROBE: NORMAL
PLATELET # BLD AUTO: 210 K/UL (ref 135–450)
PMV BLD AUTO: 8.2 FL (ref 5–10.5)
POTASSIUM SERPL-SCNC: 3.4 MMOL/L (ref 3.5–5.1)
RBC # BLD AUTO: 3.68 M/UL (ref 4–5.2)
REPORT: NORMAL
SODIUM SERPL-SCNC: 137 MMOL/L (ref 136–145)
TRIGL SERPL-MCNC: 76 MG/DL (ref 0–150)
VANCOMYCIN SERPL-MCNC: 20.3 UG/ML
VLDLC SERPL CALC-MCNC: 15 MG/DL
WBC # BLD AUTO: 13.9 K/UL (ref 4–11)

## 2024-11-27 PROCEDURE — 2580000003 HC RX 258

## 2024-11-27 PROCEDURE — 6360000002 HC RX W HCPCS: Performed by: INTERNAL MEDICINE

## 2024-11-27 PROCEDURE — 83735 ASSAY OF MAGNESIUM: CPT

## 2024-11-27 PROCEDURE — 2060000000 HC ICU INTERMEDIATE R&B

## 2024-11-27 PROCEDURE — 80061 LIPID PANEL: CPT

## 2024-11-27 PROCEDURE — 80202 ASSAY OF VANCOMYCIN: CPT

## 2024-11-27 PROCEDURE — 93005 ELECTROCARDIOGRAM TRACING: CPT | Performed by: INTERNAL MEDICINE

## 2024-11-27 PROCEDURE — 2580000003 HC RX 258: Performed by: INTERNAL MEDICINE

## 2024-11-27 PROCEDURE — 99232 SBSQ HOSP IP/OBS MODERATE 35: CPT

## 2024-11-27 PROCEDURE — 6370000000 HC RX 637 (ALT 250 FOR IP)

## 2024-11-27 PROCEDURE — 6370000000 HC RX 637 (ALT 250 FOR IP): Performed by: NURSE PRACTITIONER

## 2024-11-27 PROCEDURE — 85027 COMPLETE CBC AUTOMATED: CPT

## 2024-11-27 PROCEDURE — 93010 ELECTROCARDIOGRAM REPORT: CPT | Performed by: INTERNAL MEDICINE

## 2024-11-27 PROCEDURE — 80048 BASIC METABOLIC PNL TOTAL CA: CPT

## 2024-11-27 PROCEDURE — 6360000002 HC RX W HCPCS

## 2024-11-27 PROCEDURE — 6370000000 HC RX 637 (ALT 250 FOR IP): Performed by: INTERNAL MEDICINE

## 2024-11-27 RX ORDER — ALPRAZOLAM 0.25 MG/1
0.5 TABLET ORAL 2 TIMES DAILY PRN
Status: DISCONTINUED | OUTPATIENT
Start: 2024-11-27 | End: 2024-12-06 | Stop reason: HOSPADM

## 2024-11-27 RX ORDER — POTASSIUM CHLORIDE 1500 MG/1
40 TABLET, EXTENDED RELEASE ORAL ONCE
Status: COMPLETED | OUTPATIENT
Start: 2024-11-27 | End: 2024-11-27

## 2024-11-27 RX ORDER — SODIUM CHLORIDE 9 MG/ML
INJECTION, SOLUTION INTRAVENOUS CONTINUOUS
Status: ACTIVE | OUTPATIENT
Start: 2024-11-27 | End: 2024-11-27

## 2024-11-27 RX ORDER — METHOCARBAMOL 500 MG/1
500 TABLET, FILM COATED ORAL ONCE
Status: COMPLETED | OUTPATIENT
Start: 2024-11-27 | End: 2024-11-27

## 2024-11-27 RX ORDER — VANCOMYCIN HCL IN 5 % DEXTROSE 1.25 G/25
1250 PLASTIC BAG, INJECTION (ML) INTRAVENOUS EVERY 24 HOURS
Status: DISCONTINUED | OUTPATIENT
Start: 2024-11-27 | End: 2024-11-27

## 2024-11-27 RX ORDER — HONEY 100 %
PASTE (ML) TOPICAL DAILY
Status: DISCONTINUED | OUTPATIENT
Start: 2024-11-27 | End: 2024-12-06 | Stop reason: HOSPADM

## 2024-11-27 RX ORDER — VANCOMYCIN 1 G/200ML
1000 INJECTION, SOLUTION INTRAVENOUS EVERY 24 HOURS
Status: DISCONTINUED | OUTPATIENT
Start: 2024-11-27 | End: 2024-11-28

## 2024-11-27 RX ADMIN — ALPRAZOLAM 0.5 MG: 0.25 TABLET ORAL at 16:26

## 2024-11-27 RX ADMIN — ACETAMINOPHEN 650 MG: 325 TABLET ORAL at 04:56

## 2024-11-27 RX ADMIN — METHOCARBAMOL 500 MG: 500 TABLET ORAL at 22:36

## 2024-11-27 RX ADMIN — ACETAMINOPHEN 650 MG: 325 TABLET ORAL at 22:36

## 2024-11-27 RX ADMIN — SODIUM CHLORIDE, PRESERVATIVE FREE 10 ML: 5 INJECTION INTRAVENOUS at 21:19

## 2024-11-27 RX ADMIN — Medication: at 11:48

## 2024-11-27 RX ADMIN — SODIUM CHLORIDE, PRESERVATIVE FREE 10 ML: 5 INJECTION INTRAVENOUS at 09:04

## 2024-11-27 RX ADMIN — ENOXAPARIN SODIUM 30 MG: 100 INJECTION SUBCUTANEOUS at 08:46

## 2024-11-27 RX ADMIN — ENOXAPARIN SODIUM 30 MG: 100 INJECTION SUBCUTANEOUS at 21:21

## 2024-11-27 RX ADMIN — BUMETANIDE 2 MG: 1 TABLET ORAL at 08:47

## 2024-11-27 RX ADMIN — CEFEPIME 2000 MG: 2 INJECTION, POWDER, FOR SOLUTION INTRAVENOUS at 09:06

## 2024-11-27 RX ADMIN — POTASSIUM CHLORIDE 40 MEQ: 1500 TABLET, EXTENDED RELEASE ORAL at 09:10

## 2024-11-27 RX ADMIN — ACETAMINOPHEN 650 MG: 325 TABLET ORAL at 12:01

## 2024-11-27 RX ADMIN — CEFEPIME 2000 MG: 2 INJECTION, POWDER, FOR SOLUTION INTRAVENOUS at 21:18

## 2024-11-27 RX ADMIN — ATORVASTATIN CALCIUM 40 MG: 40 TABLET, FILM COATED ORAL at 21:22

## 2024-11-27 RX ADMIN — SODIUM CHLORIDE: 9 INJECTION, SOLUTION INTRAVENOUS at 12:05

## 2024-11-27 RX ADMIN — VANCOMYCIN 1000 MG: 1 INJECTION, SOLUTION INTRAVENOUS at 17:16

## 2024-11-27 RX ADMIN — ASPIRIN 81 MG: 81 TABLET, CHEWABLE ORAL at 08:47

## 2024-11-27 RX ADMIN — PANTOPRAZOLE SODIUM 40 MG: 40 TABLET, DELAYED RELEASE ORAL at 08:47

## 2024-11-27 ASSESSMENT — PAIN SCALES - GENERAL
PAINLEVEL_OUTOF10: 1
PAINLEVEL_OUTOF10: 6
PAINLEVEL_OUTOF10: 1
PAINLEVEL_OUTOF10: 3
PAINLEVEL_OUTOF10: 7
PAINLEVEL_OUTOF10: 4

## 2024-11-27 ASSESSMENT — PAIN DESCRIPTION - ORIENTATION: ORIENTATION: OTHER (COMMENT)

## 2024-11-27 ASSESSMENT — PAIN DESCRIPTION - DESCRIPTORS
DESCRIPTORS: ACHING

## 2024-11-27 ASSESSMENT — PAIN - FUNCTIONAL ASSESSMENT
PAIN_FUNCTIONAL_ASSESSMENT: ACTIVITIES ARE NOT PREVENTED
PAIN_FUNCTIONAL_ASSESSMENT: ACTIVITIES ARE NOT PREVENTED
PAIN_FUNCTIONAL_ASSESSMENT: PREVENTS OR INTERFERES SOME ACTIVE ACTIVITIES AND ADLS

## 2024-11-27 ASSESSMENT — PAIN DESCRIPTION - LOCATION
LOCATION: HEAD
LOCATION: JAW;TEETH
LOCATION: HEAD

## 2024-11-27 ASSESSMENT — PAIN DESCRIPTION - PAIN TYPE
TYPE: ACUTE PAIN
TYPE: ACUTE PAIN

## 2024-11-27 NOTE — PROGRESS NOTES
.Department of Pharmacy    Notification received from laboratory of positive blood culture results.    Organism(s) detected:  one BC positive for Pseudomonas aeruginosa    - pending sensitivity, currently on cefepime 2gm q12h ( CrCL ~ 30ml/in) .  Rohini Rendon/Sunny. 11/27/24 10:32 AM EST

## 2024-11-27 NOTE — CONSULTS
[facility] Ostomy Continence Nurse  Consult Note       NAME:  Annie Singleton  MEDICAL RECORD NUMBER:  [id number]  AGE: 84 y.o.   GENDER: female  : 1940  TODAY'S DATE:  2024    Subjective;   Daughter at bed side, stated they have been treating area in coccyx where it gets better then worse.   Reason for WOCN Evaluation and Assessment:     dti on sacrum     Bi lateral buttocks purple in color;  blanches well.  Patient obese sits in recliner all the time.     Open Fissure within coccyx     Annie Singleton is a 84 y.o. female referred by:   [] Physician  [x] Nursing  [] Other:     Wound Identification:  Wound Type: moisture  Contributing Factors: chronic pressure, decreased mobility, shear force, and obesity    Wound History: 84 y.o. female who presented to [facility] with above complaint.  PMHx significant for legally blind, depression, anxiety atrial fibrillation GERD hypertension bronchial asthma.  She lives in nursing home  Current Wound Care Treatment:  zinc    Patient Goal of Care:  [x] Wound Healing  [] Odor Control  [] Palliative Care  [x] Pain Control   [] Other:         PAST MEDICAL HISTORY        Diagnosis Date    Arrhythmia     Arthritis     OSTEOARTHRITIS    Asthma     Atrial fibrillation (HCC)     Diverticula of colon 10/12/2015    GERD (gastroesophageal reflux disease)     Hypertension     IBS (irritable bowel syndrome)        PAST SURGICAL HISTORY    Past Surgical History:   Procedure Laterality Date    COLONOSCOPY      1994 OCT 12,2015 (diverticulosis)    HYSTERECTOMY (CERVIX STATUS UNKNOWN)      JOINT REPLACEMENT      PATRICIO TKR    KNEE SURGERY         FAMILY HISTORY    Family History   Problem Relation Age of Onset    Heart Disease Mother     Other Father     Other Sister     Cancer Brother        SOCIAL HISTORY    Social History     Tobacco Use    Smoking status: Former     Current packs/day: 0.00     Average packs/day: 0.3 packs/day for 2.0 years (0.5 ttl pk-yrs)     Types:  Fransico RN, BSN on 11/27/2024 at 10:36 AM

## 2024-11-27 NOTE — TELEPHONE ENCOUNTER
PMKW- is there anyway for this to be on the chart for medical staff to see? Daughter (okay per HIPAA) wants notified when nursing home calls etc?

## 2024-11-27 NOTE — TELEPHONE ENCOUNTER
Renae contacted office asking if there is anyway to always call her with any modifications or new information if/when the nursing home calls our office for things. Please advise this message. I am not sure if there is some kind of FYI for medical staff to see this.

## 2024-11-27 NOTE — PROGRESS NOTES
[facility]     Electrophysiology                                     Progress Note    Admission date:  2024    Reason for follow up visit: Ventricular tachycardia    HPI/CC: Annie Singleton was admitted on 2024 with abdominal pain.  Patient has history of irritable bowel syndrome.  Patient reports acute onset of debra pain x 1 hour prior to arrival.  While in the emergency room patient was noted to have prolonged episode of rapid polymorphic ventricular tachycardia induced with a long short cycle length sequence.  Her corrected QT interval on ECG was 570 ms..  In the emergency room she was noted to have a potassium of 3.2 which later dropped to 2.7.  She has also been receiving multiple opioid medications at the [facility] which is known to prolong QT.EP consulted.  Recommend holding opioid medication and Zofran    Rhythm has been atrial fibrillation--no ventricular tachycardia or NSVT noted on telemetry.     Subjective: Daughter at bedside.  Patient is legally blind.  She is alert and oriented.  She denies chest pain shortness of breath and palpitations.  She does state her stomach started hurting after eating breakfast.  Long discussion with daughter and patient regarding plan of care.  Daughter states patient was receiving Norco instead of Tylenol or ibuprofen for aches and pains along with her Xanax.  Daughter states she is unsure if patient received Zofran as patient complains frequently of upset stomach and nausea.  Instructed daughter and patient she likely needs to be taking regular Tylenol or ibuprofen for aches and pains and not Norco if able to tolerate    Vitals:  Blood pressure 102/61, pulse 58, temperature 97.7 °F (36.5 °C), temperature source Oral, resp. rate 20, height 1.651 m (5' 5\"), weight 116.6 kg (257 lb 0.9 oz), SpO2 95%.  Temp  Av °F (36.7 °C)  Min: 97.6 °F (36.4 °C)  Max: 98.3 °F (36.8 °C)  Pulse  Av.3  Min: 57  Max: 76  BP  Min: 95/59  Max:

## 2024-11-27 NOTE — PROGRESS NOTES
[facility] Medicine Progress Note  V 10.25      Date of Admission: 11/26/2024    Hospital Day: 2      Chief Admission Complaint:  Chest Pain and Abdominal Pain     Subjective:  Patient seen and examined. She states that she is feeling better. She states that she is breathing better.     Presenting Admission History:       84 y.o. female who presented to [facility] with above complaint.  PMHx significant for legally blind, depression, anxiety atrial fibrillation GERD hypertension bronchial asthma.  She lives in nursing home  Developed chest pain and epigastric abdominal pain this morning  Chest pain was in mid chest, 7 in intensity not associated nausea vomiting or shortness of breath.  She has epigastric and left upper quadrant pain which started the same time with a chest pain  Currently she does not have chest pain or abdominal pain  On 6 L oxygen, she told she had cough yesterday not sure about the sputum, no fever  No change in mental status or any focal neurological symptoms  She denies dizziness or syncope or palpitation   she normally walks with a walker.    She has poor short-term memory   History is from patient ER physician and daughters    Assessment/Plan:      Current Principal Problem:  Chest pain    Chest Pain   Elevated Troponin   - Troponin elevation in the setting of COLLINS   - Continue tele   - Continue aspirin and statin     QT Prolongation   Non-sustained Ventricular Tachycardia   - Secondary to electrolyte abnormalities, Norco administration and Zofran administration   - Likely to correct with improvements in electrolytes   - Would recommend d/c of [place] at d/c   - EP consulted - avoid narcotics and Zofran together     Abdominal Pain   - CT of abdomen and pelvis revealed a distended gallbladder   - Ordered placed for RUQ u/s to r/o gall bladder pathology     Hypokalemia   - Improving   - Continue to monitor BMP and supplement as needed     COLLINS   - Secondary to hypotension?  - Gentle IVFs   -  11/26/24  0904 11/26/24  1802 11/26/24  1933   TROPHS 24* 31* 32*     No results for input(s): \"LABA1C\" in the last 72 hours.  Recent Labs     11/26/24  0805   AST 23   ALT 7*   BILITOT 1.9*   ALKPHOS 118     No results for input(s): \"INR\", \"LACTA\", \"TSH\" in the last 72 hours.    Urine Cultures:   Lab Results   Component Value Date/Time    LABURIN  07/31/2021 05:18 PM     <50,000 CFU/ml mixed skin/urogenital satya. No further workup     Blood Cultures:   Lab Results   Component Value Date/Time    BC  11/26/2024 08:05 AM     Gram stain Aerobic bottle:  Gram negative rods  Information to follow      BC See additional report for complete BCID panel. 11/26/2024 08:05 AM     Lab Results   Component Value Date/Time    BLOODCULT2  11/26/2024 03:06 PM     No Growth to date.  Any change in status will be called.     Organism:   Lab Results   Component Value Date/Time    ORG Pseudomonas aeruginosa DNA Detected 11/26/2024 08:05 AM         Carlita Luis DO

## 2024-11-27 NOTE — PROGRESS NOTES
/61   Pulse 58   Temp 97.7 °F (36.5 °C) (Oral)   Resp 20   Ht 1.651 m (5' 5\")   Wt 116.6 kg (257 lb 0.9 oz)   SpO2 95%   BMI 42.78 kg/m²  on room air.  Pt resting quietly in bed with daughter at bedside.  Pt with complaints of ongoing headache, \"3\", will administer prn tylenol per pt request when due.  Pt denies shortness of breath.  Lungs clear upper lobes, crackles in LLL and diminished other wise.  Pt with occasional productive cough.  Atrial Fibrillation on tele.  Pt turned and repositioned.  New external catheter placed.  Inter dry in place under breasts.  Zinc in place groin.  Zinc with sacral heart in place on coccyx.  BLE elevated off bed onto pillow with heels floated off pillow.  Pt denies any other needs at this time.  Bedside table, call light within reach.  Bed alarm activated on bed.  Anny Bhatt RN  11/27/2024

## 2024-11-27 NOTE — CARE COORDINATION
Per previous CM initial note, patient resides at [facility] and the plan will be to return. Even if skilled services needed she has Medicare primary. LLL pneumonia. Septic work up done in ED. On IVABX's. Limited code / do not intubate. On room air this AM. CM will follow hospital course for barriers and assist with discharge back to [facility] when medically stable for discharge.

## 2024-11-27 NOTE — PROGRESS NOTES
Writer received call from micro lab regarding blood cultures, 1 aerobic bottle is positive for pseudomonas aeruginosa.  Writer sending message to MD regarding.  Anny Bhatt RN  11/27/2024

## 2024-11-27 NOTE — RT PROTOCOL NOTE
RT Inhaler-Nebulizer Bronchodilator Protocol Note    There is a bronchodilator order in the chart from a provider indicating to follow the RT Bronchodilator Protocol and there is an “Initiate RT Inhaler-Nebulizer Bronchodilator Protocol” order as well (see protocol at bottom of note).    CXR Findings:  XR CHEST PORTABLE    Result Date: 11/26/2024  No acute process.       The findings from the last RT Protocol Assessment were as follows:   History Pulmonary Disease: Chronic pulmonary disease  Respiratory Pattern: Regular pattern and RR 12-20 bpm  Breath Sounds: Slightly diminished and/or crackles  Cough: Strong, spontaneous, non-productive  Indication for Bronchodilator Therapy: On home bronchodilators  Bronchodilator Assessment Score: 4    Aerosolized bronchodilator medication orders have been revised according to the RT Inhaler-Nebulizer Bronchodilator Protocol below.    Respiratory Therapist to perform RT Therapy Protocol Assessment initially then follow the protocol.  Repeat RT Therapy Protocol Assessment PRN for score 0-3 or on second treatment, BID, and PRN for scores above 3.    No Indications - adjust the frequency to every 6 hours PRN wheezing or bronchospasm, if no treatments needed after 48 hours then discontinue using Per Protocol order mode.     If indication present, adjust the RT bronchodilator orders based on the Bronchodilator Assessment Score as indicated below.  Use Inhaler orders unless patient has one or more of the following: on home nebulizer, not able to hold breath for 10 seconds, is not alert and oriented, cannot activate and use MDI correctly, or respiratory rate 25 breaths per minute or more, then use the equivalent nebulizer order(s) with same Frequency and PRN reasons based on the score.  If a patient is on this medication at home then do not decrease Frequency below that used at home.    0-3 - enter or revise RT bronchodilator order(s) to equivalent RT Bronchodilator order with

## 2024-11-27 NOTE — PLAN OF CARE
Problem: Discharge Planning  Goal: Discharge to home or other facility with appropriate resources  11/26/2024 2257 by Siri Bentley RN  Outcome: Progressing  11/26/2024 1843 by Юлия Siddiqui RN  Outcome: Progressing  Flowsheets  Taken 11/26/2024 1831  Discharge to home or other facility with appropriate resources:   Identify barriers to discharge with patient and caregiver   Arrange for needed discharge resources and transportation as appropriate   Identify discharge learning needs (meds, wound care, etc)   Refer to discharge planning if patient needs post-hospital services based on physician order or complex needs related to functional status, cognitive ability or social support system  Taken 11/26/2024 1817  Discharge to home or other facility with appropriate resources:   Identify barriers to discharge with patient and caregiver   Identify discharge learning needs (meds, wound care, etc)   Refer to discharge planning if patient needs post-hospital services based on physician order or complex needs related to functional status, cognitive ability or social support system   Arrange for needed discharge resources and transportation as appropriate     Problem: Pain  Goal: Verbalizes/displays adequate comfort level or baseline comfort level  11/26/2024 2257 by Siri Bentley RN  Outcome: Progressing  11/26/2024 1843 by Юлия Siddiqui RN  Outcome: Progressing     Problem: Safety - Adult  Goal: Free from fall injury  11/26/2024 2257 by Siri Bentley RN  Outcome: Progressing  11/26/2024 1843 by Юлия Siddiqui RN  Outcome: Progressing     Problem: Skin/Tissue Integrity  Goal: Absence of new skin breakdown  Description: 1.  Monitor for areas of redness and/or skin breakdown  2.  Assess vascular access sites hourly  3.  Every 4-6 hours minimum:  Change oxygen saturation probe site  4.  Every 4-6 hours:  If on nasal continuous positive airway pressure, respiratory therapy assess nares and determine need for

## 2024-11-28 ENCOUNTER — APPOINTMENT (OUTPATIENT)
Dept: ULTRASOUND IMAGING | Age: 84
End: 2024-11-28
Payer: MEDICARE

## 2024-11-28 LAB
ANION GAP SERPL CALCULATED.3IONS-SCNC: 12 MMOL/L (ref 3–16)
BUN SERPL-MCNC: 37 MG/DL (ref 7–20)
CALCIUM SERPL-MCNC: 8.4 MG/DL (ref 8.3–10.6)
CHLORIDE SERPL-SCNC: 94 MMOL/L (ref 99–110)
CO2 SERPL-SCNC: 31 MMOL/L (ref 21–32)
CREAT SERPL-MCNC: 1.3 MG/DL (ref 0.6–1.2)
DEPRECATED RDW RBC AUTO: 13.7 % (ref 12.4–15.4)
EKG DIAGNOSIS: NORMAL
EKG DIAGNOSIS: NORMAL
EKG Q-T INTERVAL: 520 MS
EKG Q-T INTERVAL: 530 MS
EKG QRS DURATION: 104 MS
EKG QRS DURATION: 98 MS
EKG QTC CALCULATION (BAZETT): 511 MS
EKG QTC CALCULATION (BAZETT): 520 MS
EKG R AXIS: -26 DEGREES
EKG R AXIS: -27 DEGREES
EKG T AXIS: -36 DEGREES
EKG T AXIS: -60 DEGREES
EKG VENTRICULAR RATE: 56 BPM
EKG VENTRICULAR RATE: 60 BPM
GFR SERPLBLD CREATININE-BSD FMLA CKD-EPI: 41 ML/MIN/{1.73_M2}
GLUCOSE BLD-MCNC: 123 MG/DL (ref 70–99)
GLUCOSE SERPL-MCNC: 111 MG/DL (ref 70–99)
HCT VFR BLD AUTO: 32.5 % (ref 36–48)
HGB BLD-MCNC: 11.2 G/DL (ref 12–16)
MAGNESIUM SERPL-MCNC: 2.12 MG/DL (ref 1.8–2.4)
MCH RBC QN AUTO: 32.6 PG (ref 26–34)
MCHC RBC AUTO-ENTMCNC: 34.5 G/DL (ref 31–36)
MCV RBC AUTO: 94.4 FL (ref 80–100)
PERFORMED ON: ABNORMAL
PLATELET # BLD AUTO: 183 K/UL (ref 135–450)
PMV BLD AUTO: 8 FL (ref 5–10.5)
POTASSIUM SERPL-SCNC: 2.5 MMOL/L (ref 3.5–5.1)
POTASSIUM SERPL-SCNC: 2.8 MMOL/L (ref 3.5–5.1)
RBC # BLD AUTO: 3.44 M/UL (ref 4–5.2)
SODIUM SERPL-SCNC: 137 MMOL/L (ref 136–145)
VANCOMYCIN SERPL-MCNC: 22.8 UG/ML
WBC # BLD AUTO: 7.5 K/UL (ref 4–11)

## 2024-11-28 PROCEDURE — 6360000002 HC RX W HCPCS: Performed by: NURSE PRACTITIONER

## 2024-11-28 PROCEDURE — 76705 ECHO EXAM OF ABDOMEN: CPT

## 2024-11-28 PROCEDURE — 93005 ELECTROCARDIOGRAM TRACING: CPT

## 2024-11-28 PROCEDURE — 84132 ASSAY OF SERUM POTASSIUM: CPT

## 2024-11-28 PROCEDURE — 97162 PT EVAL MOD COMPLEX 30 MIN: CPT

## 2024-11-28 PROCEDURE — 80202 ASSAY OF VANCOMYCIN: CPT

## 2024-11-28 PROCEDURE — 6370000000 HC RX 637 (ALT 250 FOR IP): Performed by: NURSE PRACTITIONER

## 2024-11-28 PROCEDURE — 83735 ASSAY OF MAGNESIUM: CPT

## 2024-11-28 PROCEDURE — 6360000002 HC RX W HCPCS: Performed by: INTERNAL MEDICINE

## 2024-11-28 PROCEDURE — 93010 ELECTROCARDIOGRAM REPORT: CPT | Performed by: INTERNAL MEDICINE

## 2024-11-28 PROCEDURE — 6370000000 HC RX 637 (ALT 250 FOR IP): Performed by: INTERNAL MEDICINE

## 2024-11-28 PROCEDURE — 97530 THERAPEUTIC ACTIVITIES: CPT

## 2024-11-28 PROCEDURE — 85027 COMPLETE CBC AUTOMATED: CPT

## 2024-11-28 PROCEDURE — 6370000000 HC RX 637 (ALT 250 FOR IP)

## 2024-11-28 PROCEDURE — 36592 COLLECT BLOOD FROM PICC: CPT

## 2024-11-28 PROCEDURE — 6360000002 HC RX W HCPCS: Performed by: FAMILY MEDICINE

## 2024-11-28 PROCEDURE — 99232 SBSQ HOSP IP/OBS MODERATE 35: CPT | Performed by: STUDENT IN AN ORGANIZED HEALTH CARE EDUCATION/TRAINING PROGRAM

## 2024-11-28 PROCEDURE — 97166 OT EVAL MOD COMPLEX 45 MIN: CPT

## 2024-11-28 PROCEDURE — 2060000000 HC ICU INTERMEDIATE R&B

## 2024-11-28 PROCEDURE — 2580000003 HC RX 258: Performed by: INTERNAL MEDICINE

## 2024-11-28 PROCEDURE — 80048 BASIC METABOLIC PNL TOTAL CA: CPT

## 2024-11-28 RX ORDER — FLUTICASONE PROPIONATE 110 UG/1
1 AEROSOL, METERED RESPIRATORY (INHALATION) DAILY PRN
Status: DISCONTINUED | OUTPATIENT
Start: 2024-11-28 | End: 2024-12-06 | Stop reason: HOSPADM

## 2024-11-28 RX ORDER — POTASSIUM CHLORIDE 7.45 MG/ML
10 INJECTION INTRAVENOUS PRN
Status: DISCONTINUED | OUTPATIENT
Start: 2024-11-28 | End: 2024-12-06 | Stop reason: HOSPADM

## 2024-11-28 RX ORDER — GABAPENTIN 100 MG/1
100 CAPSULE ORAL 3 TIMES DAILY
Status: DISCONTINUED | OUTPATIENT
Start: 2024-11-28 | End: 2024-12-06 | Stop reason: HOSPADM

## 2024-11-28 RX ORDER — PANTOPRAZOLE SODIUM 40 MG/1
40 TABLET, DELAYED RELEASE ORAL
Status: DISCONTINUED | OUTPATIENT
Start: 2024-11-28 | End: 2024-12-06 | Stop reason: HOSPADM

## 2024-11-28 RX ORDER — METHOCARBAMOL 500 MG/1
500 TABLET, FILM COATED ORAL 3 TIMES DAILY PRN
Status: DISCONTINUED | OUTPATIENT
Start: 2024-11-28 | End: 2024-12-06 | Stop reason: HOSPADM

## 2024-11-28 RX ORDER — MAGNESIUM SULFATE IN WATER 40 MG/ML
2000 INJECTION, SOLUTION INTRAVENOUS PRN
Status: DISCONTINUED | OUTPATIENT
Start: 2024-11-28 | End: 2024-12-06 | Stop reason: HOSPADM

## 2024-11-28 RX ORDER — POTASSIUM CHLORIDE 7.45 MG/ML
10 INJECTION INTRAVENOUS
Status: COMPLETED | OUTPATIENT
Start: 2024-11-28 | End: 2024-11-28

## 2024-11-28 RX ORDER — POTASSIUM CHLORIDE 1500 MG/1
40 TABLET, EXTENDED RELEASE ORAL PRN
Status: DISCONTINUED | OUTPATIENT
Start: 2024-11-28 | End: 2024-12-06 | Stop reason: HOSPADM

## 2024-11-28 RX ORDER — MORPHINE SULFATE 2 MG/ML
2 INJECTION, SOLUTION INTRAMUSCULAR; INTRAVENOUS EVERY 4 HOURS PRN
Status: DISCONTINUED | OUTPATIENT
Start: 2024-11-28 | End: 2024-12-06 | Stop reason: HOSPADM

## 2024-11-28 RX ORDER — MORPHINE SULFATE 2 MG/ML
1 INJECTION, SOLUTION INTRAMUSCULAR; INTRAVENOUS EVERY 4 HOURS PRN
Status: DISCONTINUED | OUTPATIENT
Start: 2024-11-28 | End: 2024-12-06 | Stop reason: HOSPADM

## 2024-11-28 RX ORDER — FENTANYL CITRATE 50 UG/ML
25 INJECTION, SOLUTION INTRAMUSCULAR; INTRAVENOUS ONCE
Status: COMPLETED | OUTPATIENT
Start: 2024-11-28 | End: 2024-11-28

## 2024-11-28 RX ADMIN — GABAPENTIN 100 MG: 100 CAPSULE ORAL at 12:06

## 2024-11-28 RX ADMIN — POTASSIUM CHLORIDE 10 MEQ: 7.46 INJECTION, SOLUTION INTRAVENOUS at 09:34

## 2024-11-28 RX ADMIN — POTASSIUM CHLORIDE 10 MEQ: 7.46 INJECTION, SOLUTION INTRAVENOUS at 21:52

## 2024-11-28 RX ADMIN — ENOXAPARIN SODIUM 30 MG: 100 INJECTION SUBCUTANEOUS at 21:03

## 2024-11-28 RX ADMIN — GABAPENTIN 100 MG: 100 CAPSULE ORAL at 21:02

## 2024-11-28 RX ADMIN — POTASSIUM CHLORIDE 10 MEQ: 7.46 INJECTION, SOLUTION INTRAVENOUS at 07:07

## 2024-11-28 RX ADMIN — PANTOPRAZOLE SODIUM 40 MG: 40 TABLET, DELAYED RELEASE ORAL at 09:36

## 2024-11-28 RX ADMIN — ATORVASTATIN CALCIUM 40 MG: 40 TABLET, FILM COATED ORAL at 21:03

## 2024-11-28 RX ADMIN — CEFEPIME 2000 MG: 2 INJECTION, POWDER, FOR SOLUTION INTRAVENOUS at 21:19

## 2024-11-28 RX ADMIN — FENTANYL CITRATE 25 MCG: 50 INJECTION INTRAMUSCULAR; INTRAVENOUS at 04:58

## 2024-11-28 RX ADMIN — PANTOPRAZOLE SODIUM 40 MG: 40 TABLET, DELAYED RELEASE ORAL at 16:01

## 2024-11-28 RX ADMIN — GABAPENTIN 100 MG: 100 CAPSULE ORAL at 13:13

## 2024-11-28 RX ADMIN — ACETAMINOPHEN 650 MG: 325 TABLET ORAL at 21:02

## 2024-11-28 RX ADMIN — POTASSIUM CHLORIDE 10 MEQ: 7.46 INJECTION, SOLUTION INTRAVENOUS at 13:13

## 2024-11-28 RX ADMIN — ASPIRIN 81 MG: 81 TABLET, CHEWABLE ORAL at 09:36

## 2024-11-28 RX ADMIN — ENOXAPARIN SODIUM 30 MG: 100 INJECTION SUBCUTANEOUS at 09:36

## 2024-11-28 RX ADMIN — Medication: at 09:45

## 2024-11-28 RX ADMIN — SODIUM CHLORIDE, PRESERVATIVE FREE 10 ML: 5 INJECTION INTRAVENOUS at 09:43

## 2024-11-28 RX ADMIN — POTASSIUM CHLORIDE 10 MEQ: 7.46 INJECTION, SOLUTION INTRAVENOUS at 19:36

## 2024-11-28 RX ADMIN — POTASSIUM CHLORIDE 10 MEQ: 7.46 INJECTION, SOLUTION INTRAVENOUS at 17:58

## 2024-11-28 RX ADMIN — METHOCARBAMOL 500 MG: 500 TABLET ORAL at 12:06

## 2024-11-28 RX ADMIN — CEFEPIME 2000 MG: 2 INJECTION, POWDER, FOR SOLUTION INTRAVENOUS at 09:44

## 2024-11-28 RX ADMIN — POTASSIUM CHLORIDE 10 MEQ: 7.46 INJECTION, SOLUTION INTRAVENOUS at 12:04

## 2024-11-28 RX ADMIN — Medication 4.5 MG: at 21:02

## 2024-11-28 RX ADMIN — SODIUM CHLORIDE, PRESERVATIVE FREE 10 ML: 5 INJECTION INTRAVENOUS at 21:04

## 2024-11-28 RX ADMIN — METHOCARBAMOL 500 MG: 500 TABLET ORAL at 21:02

## 2024-11-28 RX ADMIN — POTASSIUM CHLORIDE 10 MEQ: 7.46 INJECTION, SOLUTION INTRAVENOUS at 17:02

## 2024-11-28 RX ADMIN — ALPRAZOLAM 0.5 MG: 0.25 TABLET ORAL at 13:14

## 2024-11-28 ASSESSMENT — PAIN DESCRIPTION - DESCRIPTORS
DESCRIPTORS: ACHING

## 2024-11-28 ASSESSMENT — PAIN SCALES - GENERAL
PAINLEVEL_OUTOF10: 0
PAINLEVEL_OUTOF10: 10
PAINLEVEL_OUTOF10: 0
PAINLEVEL_OUTOF10: 10
PAINLEVEL_OUTOF10: 10
PAINLEVEL_OUTOF10: 5

## 2024-11-28 ASSESSMENT — PAIN - FUNCTIONAL ASSESSMENT
PAIN_FUNCTIONAL_ASSESSMENT: ACTIVITIES ARE NOT PREVENTED
PAIN_FUNCTIONAL_ASSESSMENT: PREVENTS OR INTERFERES SOME ACTIVE ACTIVITIES AND ADLS

## 2024-11-28 ASSESSMENT — PAIN DESCRIPTION - ORIENTATION
ORIENTATION: OTHER (COMMENT)
ORIENTATION: OTHER (COMMENT)

## 2024-11-28 ASSESSMENT — PAIN DESCRIPTION - PAIN TYPE: TYPE: ACUTE PAIN

## 2024-11-28 ASSESSMENT — PAIN DESCRIPTION - LOCATION
LOCATION: JAW;TEETH
LOCATION: JAW;TEETH
LOCATION: ABDOMEN

## 2024-11-28 NOTE — FLOWSHEET NOTE
11/27/24 2315   Vital Signs   Temp 98.3 °F (36.8 °C)   Temp Source Oral   Pulse 60   Heart Rate Source Monitor   Respirations 18   /63   MAP (Calculated) 79   BP Location Left lower arm   BP Method Automatic   Patient Position Semi fowlers;Lying right side   Pain Assessment   Pain Assessment None - Denies Pain   Care Plan - Pain Goals   Verbalizes/displays adequate comfort level or baseline comfort level Encourage patient to monitor pain and request assistance   Opioid-Induced Sedation   POSS Score 1   RASS   Meade Agitation Sedation Scale (RASS) 0   Oxygen Therapy   SpO2 94 %   Pulse Oximetry Type Intermittent   Pulse Oximeter Device Mode Intermittent   Pulse Oximeter Device Location Right;Finger   O2 Device None (Room air)   O2 Flow Rate (L/min) 0 L/min   Oxygen Therapy None (Room air)

## 2024-11-28 NOTE — FLOWSHEET NOTE
11/28/24 0353   Vital Signs   Temp 98.3 °F (36.8 °C)   Temp Source Axillary   Pulse 55   Heart Rate Source Monitor   Respirations 18   BP (!) 104/58   MAP (Calculated) 73   BP Location Left upper arm   BP Method Automatic   Patient Position Semi fowlers   Pain Assessment   Pain Assessment 0-10   Pain Level 10   Pain Location Jaw;Teeth   Pain Descriptors Aching   Functional Pain Assessment Activities are not prevented   Pain Type Acute pain   Oxygen Therapy   SpO2 96 %   Pulse Oximetry Type Intermittent   Pulse Oximeter Device Mode Intermittent   Pulse Oximeter Device Location Right;Finger   O2 Device None (Room air)   O2 Flow Rate (L/min) 0 L/min   Oxygen Therapy None (Room air)

## 2024-11-28 NOTE — PROGRESS NOTES
AM labs resulted with critical K+ 2.5  No PRN orders.  Provider RICHIE Quiroz notified through [employer].  Will continue to monitor.

## 2024-11-28 NOTE — PLAN OF CARE
Problem: Discharge Planning  Goal: Discharge to home or other facility with appropriate resources  Outcome: Progressing  Flowsheets (Taken 11/27/2024 2012)  Discharge to home or other facility with appropriate resources: Identify barriers to discharge with patient and caregiver     Problem: Pain  Goal: Verbalizes/displays adequate comfort level or baseline comfort level  11/27/2024 2250 by Iza Mitchell, RN  Outcome: Progressing  Flowsheets (Taken 11/27/2024 2012)  Verbalizes/displays adequate comfort level or baseline comfort level: Encourage patient to monitor pain and request assistance  11/27/2024 1913 by Anny Bhatt, RN  Outcome: Progressing  Note: Pt with complaints of headache this shift, medicated with prn tylenol with effectiveness.       Problem: Safety - Adult  Goal: Free from fall injury  11/27/2024 2250 by Iza Mitchell, RN  Outcome: Progressing  11/27/2024 1913 by Anny Bhatt RN  Outcome: Progressing     Problem: Skin/Tissue Integrity  Goal: Absence of new skin breakdown  Description: 1.  Monitor for areas of redness and/or skin breakdown  2.  Assess vascular access sites hourly  3.  Every 4-6 hours minimum:  Change oxygen saturation probe site  4.  Every 4-6 hours:  If on nasal continuous positive airway pressure, respiratory therapy assess nares and determine need for appliance change or resting period.  11/27/2024 2250 by Iza Mitchell, RN  Outcome: Progressing  11/27/2024 1913 by Anny Bhatt RN  Note: No new areas of skin break down noted this shift.  Pt turned and repositioned every 2 hours.  Triad paste/sacral heart in place on coccyx.  Inter dry under breast and folds/groin.  Wound care saw pt today.  BLE elevated off bed onto pillows with heels floated off pillows.       Problem: Respiratory - Adult  Goal: Achieves optimal ventilation and oxygenation  11/27/2024 2250 by Iza Mitchell, RN  Outcome: Progressing  Flowsheets (Taken  11/27/2024 2012)  Achieves optimal ventilation and oxygenation: Assess for changes in respiratory status  11/27/2024 1913 by Anny Bhatt RN  Outcome: Progressing  Note: Pt on room air this shift, with O2 sats greater than 92%; pt denies shortness of breath.       Problem: Cardiovascular - Adult  Goal: Absence of cardiac dysrhythmias or at baseline  11/27/2024 2250 by Iza Mitchell, RN  Outcome: Progressing  Flowsheets (Taken 11/27/2024 2012)  Absence of cardiac dysrhythmias or at baseline: Monitor cardiac rate and rhythm  11/27/2024 1913 by Anny Bhatt, RN  Outcome: Progressing  Note: Atrial Fibrillation on tele, controlled.       Problem: Skin/Tissue Integrity - Adult  Goal: Skin integrity remains intact  Outcome: Progressing  Flowsheets (Taken 11/27/2024 2012)  Skin Integrity Remains Intact: Monitor for areas of redness and/or skin breakdown     Problem: Genitourinary - Adult  Goal: Absence of urinary retention  11/27/2024 2250 by Iza Mitchell, RN  Outcome: Progressing  Flowsheets (Taken 11/27/2024 2012)  Absence of urinary retention: Assess patient’s ability to void and empty bladder  11/27/2024 1913 by Anny Bhatt RN  Outcome: Progressing  Note: External catheter in place, see doc flow sheet for output.       Problem: Metabolic/Fluid and Electrolytes - Adult  Goal: Electrolytes maintained within normal limits  11/27/2024 2250 by Iza Mitchell, RN  Outcome: Progressing  Flowsheets (Taken 11/27/2024 2012)  Electrolytes maintained within normal limits: Monitor labs and assess patient for signs and symptoms of electrolyte imbalances  11/27/2024 1913 by Anny Bhatt, RN  Outcome: Progressing  Note: K 3.4 today, replaced with 40meq PO today.

## 2024-11-28 NOTE — FLOWSHEET NOTE
11/27/24 2012   Vital Signs   Temp 98 °F (36.7 °C)   Temp Source Oral   Pulse 62   Heart Rate Source Monitor   Respirations 18   BP (!) 101/51   MAP (Calculated) 68   BP Location Left lower arm   BP Method Automatic   Patient Position Lying right side   Pain Assessment   Pain Assessment 0-10   Pain Level 4   Pain Location Head   Pain Descriptors Aching   Functional Pain Assessment Activities are not prevented   Pain Type Acute pain   Non-Pharmaceutical Pain Intervention(s) Family support;Emotional support;Cold pack   Care Plan - Pain Goals   Verbalizes/displays adequate comfort level or baseline comfort level Encourage patient to monitor pain and request assistance   Opioid-Induced Sedation   POSS Score 1   RASS   Meade Agitation Sedation Scale (RASS) 0   Oxygen Therapy   SpO2 91 %   Pulse Oximetry Type Intermittent   Pulse Oximeter Device Mode Intermittent   Pulse Oximeter Device Location Right;Finger   O2 Device None (Room air)   O2 Flow Rate (L/min) 0 L/min   Oxygen Therapy None (Room air)     Pt resting comfortably in bed with family at bedside. IV infusing as ordered. Bed alarm on, call light within reach. No needs expressed at this time. Will continue to monitor.

## 2024-11-28 NOTE — PROGRESS NOTES
[facility]     Electrophysiology                                     Progress Note    Admission date:  2024    Reason for follow up visit: Ventricular tachycardia    HPI/CC: Annie Singleton was admitted on 2024 with abdominal pain.  Patient has history of irritable bowel syndrome.  Patient reports acute onset of debra pain x 1 hour prior to arrival.  While in the emergency room patient was noted to have prolonged episode of rapid polymorphic ventricular tachycardia induced with a long short cycle length sequence.  Her corrected QT interval on ECG was 570 ms..  In the emergency room she was noted to have a potassium of 3.2 which later dropped to 2.7.  She has also been receiving multiple opioid medications at the [facility] which is known to prolonged  QT.EP consulted.  Recommend holding opioid medication and Zofran    Repeat ECG this AM: rate controlled Afib , NSTTWA, QT prolonged but improved to 511 ms from 590 ms    Telemetry reviewed overnight.  A-fib rates high 50s to 60s.  No ventricular ectopy or or further recurrence of VT.    Subjective: Daughter at bedside.  Patient is legally blind.  She is alert and oriented.  She denies chest pain shortness of breath and palpitations.  Patient nauseous and had an episode emesis while in the room.      Vitals:  Blood pressure 126/65, pulse 58, temperature 97.6 °F (36.4 °C), temperature source Oral, resp. rate 20, height 1.651 m (5' 5\"), weight 118 kg (260 lb 2.3 oz), SpO2 96%.  Temp  Av °F (36.7 °C)  Min: 97.6 °F (36.4 °C)  Max: 98.3 °F (36.8 °C)  Pulse  Av.5  Min: 54  Max: 62  BP  Min: 93/55  Max: 126/65  SpO2  Av.5 %  Min: 91 %  Max: 96 %    24 hour I/O    Intake/Output Summary (Last 24 hours) at 2024 1121  Last data filed at 2024 0635  Gross per 24 hour   Intake 2804.74 ml   Output 3950 ml   Net -1145.26 ml     Current Facility-Administered Medications   Medication Dose Route Frequency Provider Last

## 2024-11-28 NOTE — PLAN OF CARE
Problem: Pain  Goal: Verbalizes/displays adequate comfort level or baseline comfort level  Outcome: Progressing  Note: Pt with complaints of headache this shift, medicated with prn tylenol with effectiveness.       Problem: Safety - Adult  Goal: Free from fall injury  Outcome: Progressing     Problem: Skin/Tissue Integrity  Goal: Absence of new skin breakdown  Note: No new areas of skin break down noted this shift.  Pt turned and repositioned every 2 hours.  Triad paste/sacral heart in place on coccyx.  Inter dry under breast and folds/groin.  Wound care saw pt today.  BLE elevated off bed onto pillows with heels floated off pillows.       Problem: Respiratory - Adult  Goal: Achieves optimal ventilation and oxygenation  Outcome: Progressing  Note: Pt on room air this shift, with O2 sats greater than 92%; pt denies shortness of breath.       Problem: Cardiovascular - Adult  Goal: Absence of cardiac dysrhythmias or at baseline  Outcome: Progressing  Note: Atrial Fibrillation on tele, controlled.       Problem: Genitourinary - Adult  Goal: Absence of urinary retention  Outcome: Progressing  Note: External catheter in place, see doc flow sheet for output.       Problem: Metabolic/Fluid and Electrolytes - Adult  Goal: Electrolytes maintained within normal limits  Outcome: Progressing  Note: K 3.4 today, replaced with 40meq PO today.

## 2024-11-28 NOTE — PROGRESS NOTES
Occupational Therapy  Facility/Department: [facility]4 U  Occupational Therapy Initial Assessment    Name: Annie Singleton  : 1940  MRN: [id number]  Date of Service: 2024    Discharge Recommendations:  Subacute/Skilled Nursing Facility  OT Equipment Recommendations  Equipment Needed: No       Patient Diagnosis(es): The primary encounter diagnosis was Nonsustained ventricular tachycardia (HCC). Diagnoses of Elevated troponin, QT prolongation, Leukocytosis, unspecified type, and Pneumonia due to infectious organism, unspecified laterality, unspecified part of lung were also pertinent to this visit.  Past Medical History:  has a past medical history of Arrhythmia, Arthritis, Asthma, Atrial fibrillation (HCC), Diverticula of colon, GERD (gastroesophageal reflux disease), Hypertension, and IBS (irritable bowel syndrome).  Past Surgical History:  has a past surgical history that includes knee surgery; Colonoscopy; Hysterectomy; and joint replacement.         Therapy discharge recommendations are subject to collaboration from the patient’s interdisciplinary healthcare team, including MD and case management recommendations.     Barriers to Home Discharge:   [] Steps to access home entry or bed/bath:   [x] Unable to transfer, ambulate, or propel wheelchair household distances without assist   [x] Limited available assist at home upon discharge    [x] Patient or family requests d/c to post-acute facility    [] Poor cognition/safety awareness for d/c to home alone    [] Unable to maintain ordered weight bearing status    [] Patient with salient signs of long-standing immobility   [x] Decreased independence with ADLs   [x] Increased risk for falls   [] Other:     If pt is unable to be seen after this session, please let this note serve as discharge summary.  Please see case management note for discharge disposition.  Thank you.    Assessment  Performance deficits / Impairments: Decreased ADL status;Decreased  impairment, at times requires multiple cues)  Attention Span: Appears intact  Memory: Appears intact  Safety Judgement: Appears intact  Problem Solving: Assistance required to identify errors made  Insights: Appears intact  Initiation: Appears intact  Sequencing: Appears intact  Cognition Comment: fear of falling  Orientation  Overall Orientation Status: Impaired  Orientation Level: Oriented to place;Oriented to situation;Oriented to person (oriented to month, not year)                  Education Given To: Patient  Education Provided: Role of Therapy;Transfer Training;Plan of Care;Equipment;Precautions;Orientation;Mobility Training  Education Provided Comments: Pt educated on importance of OOB mobility, prevention of complications of bedrest, and general safety during hospitalization  Education Method: Demonstration;Verbal  Barriers to Learning: Vision  Education Outcome: Verbalized understanding;Continued education needed        AM-PAC - ADL  AM-PAC Daily Activity - Inpatient   How much help is needed for putting on and taking off regular lower body clothing?: Total  How much help is needed for bathing (which includes washing, rinsing, drying)?: Total  How much help is needed for toileting (which includes using toilet, bedpan, or urinal)?: Total  How much help is needed for putting on and taking off regular upper body clothing?: A Lot  How much help is needed for taking care of personal grooming?: A Lot  How much help for eating meals?: A Little  AM-PAC Inpatient Daily Activity Raw Score: 10  AM-PAC Inpatient ADL T-Scale Score : 27.31  ADL Inpatient CMS 0-100% Score: 74.7  ADL Inpatient CMS G-Code Modifier : CL    Goals  Short Term Goals  Time Frame for Short Term Goals: 12/05, unless otherwise noted  Short Term Goal 1: Pt will complete functional transfer/toilet transfer with CGA  Short Term Goal 2: Pt will complete toileting with CGA  Short Term Goal 3: Pt will complete UE HEP 15x reps each by 12/03  Short Term

## 2024-11-28 NOTE — PROGRESS NOTES
[facility] Medicine Progress Note  V 10.25      Date of Admission: 11/26/2024    Hospital Day: 3      Chief Admission Complaint:  Chest Pain (Pt from [facility]..pt is dnr-cc..Pt to er with cp , but pointing more towards belly. Pt is in afib with hx of same.. per squad pt was taken off thinner for a procedure.) and Abdominal Pain        Subjective:      Patient lying in bed. She states she feels terrible. Daughters at bedside and on phone when I arrive. They are concerned about the patients jaw tremor causing a HA and medications that have been held or stopped this admission. I answer all their questions and they acknowledge understanding.     Presenting Admission History:       84 y.o. female who presented to [facility] with above complaint.  PMHx significant for legally blind, depression, anxiety atrial fibrillation GERD hypertension bronchial asthma.  She lives in nursing home  Developed chest pain and epigastric abdominal pain this morning  Chest pain was in mid chest, 7 in intensity not associated nausea vomiting or shortness of breath.  She has epigastric and left upper quadrant pain which started the same time with a chest pain  Currently she does not have chest pain or abdominal pain  On 6 L oxygen, she told she had cough yesterday not sure about the sputum, no fever  No change in mental status or any focal neurological symptoms  She denies dizziness or syncope or palpitation   she normally walks with a walker.    She has poor short-term memory   History is from patient ER physician and daughters    Assessment/Plan:      Current Principal Problem:  Chest pain    Chest pain - Initially concerning for ACS but possibly secondary to multiple episodes of VTACH in ED, resolved  Initial Troponin 28 ---> 32.    EKG AFIB rate 75.    - Followed serial Troponins, reviewed and documented, and monitor on tele - currently w/out evidence of ischemia and/or arrhythmia.    - Continue aspirin 81 mg daily  - Continue          Libertad Cross, APRN - CNP

## 2024-11-28 NOTE — PROGRESS NOTES
Pt is complaining of twitching in her jaw and stating that she can feel something between her lower front teeth with her tongue-nothing can be seen among her teeth and family has used dental floss with no relief, but the twitching is visible.     Per report, this started yesterday but states it's progressing and now causing pain and headaches.     Provider Lemuel Patterson notified. Will continue to monitor.     Update: 1x order of Robaxin ordered.    Daughter in room stated that pt's mother experienced something similar with the jaw twitching.

## 2024-11-28 NOTE — PROGRESS NOTES
impairment)  Gait  Gait Training: No                         OutComes Score                                                  AM-PAC - Mobility    AM-PAC Basic Mobility - Inpatient   How much help is needed turning from your back to your side while in a flat bed without using bedrails?: A Little  How much help is needed moving from lying on your back to sitting on the side of a flat bed without using bedrails?: A Little  How much help is needed moving to and from a bed to a chair?: A Little  How much help is needed standing up from a chair using your arms?: A Little  How much help is needed walking in hospital room?: A Lot  How much help is needed climbing 3-5 steps with a railing?: A Lot  AM-PAC Inpatient Mobility Raw Score : 16  AM-PAC Inpatient T-Scale Score : 40.78  Mobility Inpatient CMS 0-100% Score: 54.16  Mobility Inpatient CMS G-Code Modifier : CK         Tinneti Score       Goals  Short Term Goals  Time Frame for Short Term Goals: 12/5 (7 days) unless otherwise stated  Short Term Goal 1: Pt will perform supine <> sit with mod I  Short Term Goal 2: Pt will perform all transfers with LRAD and SBA  Short Term Goal 3: Pt will ambulate 25 ft with LRAD and CGA  Short Term Goal 4: Pt will perform 10 reps of BLE exercises by 12/7  Patient Goals   Patient Goals : \"I would love to walk again\"       Education  Patient Education  Education Given To: Patient  Education Provided: Role of Therapy;Plan of Care;Transfer Training  Education Provided Comments: Disease Specific Education: Pt educated on importance of OOB mobility, prevention of complications of bedrest, and general safety during hospitalization.  Education Method: Verbal  Barriers to Learning: Cognition;Vision  Education Outcome: Verbalized understanding;Continued education needed      Therapy Time   Individual Concurrent Group Co-treatment   Time In 1335         Time Out 1408         Minutes 33         Timed Code Treatment Minutes: 23 Minutes + 10 minute  NICK Alegria, PT

## 2024-11-29 ENCOUNTER — ANESTHESIA EVENT (OUTPATIENT)
Dept: OPERATING ROOM | Age: 84
End: 2024-11-29
Payer: MEDICARE

## 2024-11-29 ENCOUNTER — ANESTHESIA (OUTPATIENT)
Dept: OPERATING ROOM | Age: 84
End: 2024-11-29
Payer: MEDICARE

## 2024-11-29 PROBLEM — K81.0 ACUTE CHOLECYSTITIS: Status: ACTIVE | Noted: 2024-11-26

## 2024-11-29 PROBLEM — R78.81 BACTEREMIA DUE TO PSEUDOMONAS: Status: ACTIVE | Noted: 2024-11-29

## 2024-11-29 PROBLEM — R94.31 QT PROLONGATION: Status: ACTIVE | Noted: 2024-11-29

## 2024-11-29 PROBLEM — D72.829 LEUKOCYTOSIS: Status: ACTIVE | Noted: 2024-11-29

## 2024-11-29 PROBLEM — I47.29 NONSUSTAINED VENTRICULAR TACHYCARDIA (HCC): Status: ACTIVE | Noted: 2024-11-29

## 2024-11-29 PROBLEM — B96.5 BACTEREMIA DUE TO PSEUDOMONAS: Status: ACTIVE | Noted: 2024-11-29

## 2024-11-29 LAB
ALBUMIN SERPL-MCNC: 3.1 G/DL (ref 3.4–5)
ALP SERPL-CCNC: 80 U/L (ref 40–129)
ALT SERPL-CCNC: 8 U/L (ref 10–40)
ANION GAP SERPL CALCULATED.3IONS-SCNC: 13 MMOL/L (ref 3–16)
AST SERPL-CCNC: 19 U/L (ref 15–37)
BACTERIA BLD CULT: ABNORMAL
BACTERIA BLD CULT: ABNORMAL
BILIRUB DIRECT SERPL-MCNC: 0.4 MG/DL (ref 0–0.3)
BILIRUB INDIRECT SERPL-MCNC: 0.6 MG/DL (ref 0–1)
BILIRUB SERPL-MCNC: 1 MG/DL (ref 0–1)
BUN SERPL-MCNC: 30 MG/DL (ref 7–20)
CALCIUM SERPL-MCNC: 8.6 MG/DL (ref 8.3–10.6)
CHLORIDE SERPL-SCNC: 96 MMOL/L (ref 99–110)
CO2 SERPL-SCNC: 29 MMOL/L (ref 21–32)
CREAT SERPL-MCNC: 1.1 MG/DL (ref 0.6–1.2)
DEPRECATED RDW RBC AUTO: 13.4 % (ref 12.4–15.4)
GFR SERPLBLD CREATININE-BSD FMLA CKD-EPI: 50 ML/MIN/{1.73_M2}
GLUCOSE SERPL-MCNC: 115 MG/DL (ref 70–99)
HCT VFR BLD AUTO: 32.8 % (ref 36–48)
HGB BLD-MCNC: 11.2 G/DL (ref 12–16)
MAGNESIUM SERPL-MCNC: 2.29 MG/DL (ref 1.8–2.4)
MCH RBC QN AUTO: 32.2 PG (ref 26–34)
MCHC RBC AUTO-ENTMCNC: 34.3 G/DL (ref 31–36)
MCV RBC AUTO: 93.9 FL (ref 80–100)
ORGANISM: ABNORMAL
ORGANISM: ABNORMAL
PLATELET # BLD AUTO: 185 K/UL (ref 135–450)
PMV BLD AUTO: 8.7 FL (ref 5–10.5)
POTASSIUM SERPL-SCNC: 3.1 MMOL/L (ref 3.5–5.1)
POTASSIUM SERPL-SCNC: 3.3 MMOL/L (ref 3.5–5.1)
PROT SERPL-MCNC: 5.8 G/DL (ref 6.4–8.2)
RBC # BLD AUTO: 3.49 M/UL (ref 4–5.2)
SODIUM SERPL-SCNC: 138 MMOL/L (ref 136–145)
WBC # BLD AUTO: 7.1 K/UL (ref 4–11)

## 2024-11-29 PROCEDURE — 83735 ASSAY OF MAGNESIUM: CPT

## 2024-11-29 PROCEDURE — 6360000002 HC RX W HCPCS: Performed by: INTERNAL MEDICINE

## 2024-11-29 PROCEDURE — 93005 ELECTROCARDIOGRAM TRACING: CPT | Performed by: STUDENT IN AN ORGANIZED HEALTH CARE EDUCATION/TRAINING PROGRAM

## 2024-11-29 PROCEDURE — 99222 1ST HOSP IP/OBS MODERATE 55: CPT | Performed by: SURGERY

## 2024-11-29 PROCEDURE — 6360000002 HC RX W HCPCS: Performed by: SURGERY

## 2024-11-29 PROCEDURE — 85027 COMPLETE CBC AUTOMATED: CPT

## 2024-11-29 PROCEDURE — 2580000003 HC RX 258: Performed by: INTERNAL MEDICINE

## 2024-11-29 PROCEDURE — 87040 BLOOD CULTURE FOR BACTERIA: CPT

## 2024-11-29 PROCEDURE — 36415 COLL VENOUS BLD VENIPUNCTURE: CPT

## 2024-11-29 PROCEDURE — 84132 ASSAY OF SERUM POTASSIUM: CPT

## 2024-11-29 PROCEDURE — 6370000000 HC RX 637 (ALT 250 FOR IP): Performed by: NURSE PRACTITIONER

## 2024-11-29 PROCEDURE — 6370000000 HC RX 637 (ALT 250 FOR IP): Performed by: INTERNAL MEDICINE

## 2024-11-29 PROCEDURE — 2060000000 HC ICU INTERMEDIATE R&B

## 2024-11-29 PROCEDURE — 80076 HEPATIC FUNCTION PANEL: CPT

## 2024-11-29 PROCEDURE — 80048 BASIC METABOLIC PNL TOTAL CA: CPT

## 2024-11-29 PROCEDURE — 99223 1ST HOSP IP/OBS HIGH 75: CPT | Performed by: INTERNAL MEDICINE

## 2024-11-29 RX ORDER — SODIUM CHLORIDE, SODIUM LACTATE, POTASSIUM CHLORIDE, CALCIUM CHLORIDE 600; 310; 30; 20 MG/100ML; MG/100ML; MG/100ML; MG/100ML
INJECTION, SOLUTION INTRAVENOUS CONTINUOUS
Status: CANCELLED | OUTPATIENT
Start: 2024-11-29

## 2024-11-29 RX ORDER — SODIUM CHLORIDE 0.9 % (FLUSH) 0.9 %
5-40 SYRINGE (ML) INJECTION EVERY 12 HOURS SCHEDULED
Status: CANCELLED | OUTPATIENT
Start: 2024-11-29

## 2024-11-29 RX ORDER — METRONIDAZOLE 500 MG/100ML
500 INJECTION, SOLUTION INTRAVENOUS EVERY 8 HOURS
Status: DISCONTINUED | OUTPATIENT
Start: 2024-11-29 | End: 2024-11-29

## 2024-11-29 RX ORDER — LIDOCAINE HYDROCHLORIDE 10 MG/ML
0.3 INJECTION, SOLUTION INFILTRATION; PERINEURAL
Status: CANCELLED | OUTPATIENT
Start: 2024-11-29 | End: 2024-11-30

## 2024-11-29 RX ORDER — SODIUM CHLORIDE 9 MG/ML
INJECTION, SOLUTION INTRAVENOUS PRN
Status: CANCELLED | OUTPATIENT
Start: 2024-11-29

## 2024-11-29 RX ORDER — SODIUM CHLORIDE 0.9 % (FLUSH) 0.9 %
5-40 SYRINGE (ML) INJECTION PRN
Status: CANCELLED | OUTPATIENT
Start: 2024-11-29

## 2024-11-29 RX ADMIN — POTASSIUM CHLORIDE 40 MEQ: 1500 TABLET, EXTENDED RELEASE ORAL at 21:04

## 2024-11-29 RX ADMIN — SODIUM CHLORIDE, PRESERVATIVE FREE 10 ML: 5 INJECTION INTRAVENOUS at 10:54

## 2024-11-29 RX ADMIN — PANTOPRAZOLE SODIUM 40 MG: 40 TABLET, DELAYED RELEASE ORAL at 05:36

## 2024-11-29 RX ADMIN — METHOCARBAMOL 500 MG: 500 TABLET ORAL at 05:36

## 2024-11-29 RX ADMIN — ACETAMINOPHEN 650 MG: 325 TABLET ORAL at 21:25

## 2024-11-29 RX ADMIN — MEROPENEM 1000 MG: 1 INJECTION INTRAVENOUS at 16:47

## 2024-11-29 RX ADMIN — METHOCARBAMOL 500 MG: 500 TABLET ORAL at 21:04

## 2024-11-29 RX ADMIN — ENOXAPARIN SODIUM 30 MG: 100 INJECTION SUBCUTANEOUS at 21:03

## 2024-11-29 RX ADMIN — GABAPENTIN 100 MG: 100 CAPSULE ORAL at 10:54

## 2024-11-29 RX ADMIN — Medication 4.5 MG: at 21:45

## 2024-11-29 RX ADMIN — GABAPENTIN 100 MG: 100 CAPSULE ORAL at 16:43

## 2024-11-29 RX ADMIN — METHOCARBAMOL 500 MG: 500 TABLET ORAL at 21:42

## 2024-11-29 RX ADMIN — PANTOPRAZOLE SODIUM 40 MG: 40 TABLET, DELAYED RELEASE ORAL at 16:43

## 2024-11-29 RX ADMIN — ASPIRIN 81 MG: 81 TABLET, CHEWABLE ORAL at 10:54

## 2024-11-29 RX ADMIN — METRONIDAZOLE 500 MG: 500 INJECTION, SOLUTION INTRAVENOUS at 10:55

## 2024-11-29 RX ADMIN — CEFEPIME 2000 MG: 2 INJECTION, POWDER, FOR SOLUTION INTRAVENOUS at 10:56

## 2024-11-29 RX ADMIN — ENOXAPARIN SODIUM 30 MG: 100 INJECTION SUBCUTANEOUS at 10:53

## 2024-11-29 RX ADMIN — GABAPENTIN 100 MG: 100 CAPSULE ORAL at 21:04

## 2024-11-29 RX ADMIN — SODIUM CHLORIDE, PRESERVATIVE FREE 10 ML: 5 INJECTION INTRAVENOUS at 21:05

## 2024-11-29 RX ADMIN — POTASSIUM CHLORIDE 40 MEQ: 1500 TABLET, EXTENDED RELEASE ORAL at 05:36

## 2024-11-29 RX ADMIN — ATORVASTATIN CALCIUM 40 MG: 40 TABLET, FILM COATED ORAL at 21:04

## 2024-11-29 RX ADMIN — Medication: at 18:24

## 2024-11-29 ASSESSMENT — PAIN SCALES - GENERAL
PAINLEVEL_OUTOF10: 0
PAINLEVEL_OUTOF10: 3
PAINLEVEL_OUTOF10: 0

## 2024-11-29 ASSESSMENT — PAIN - FUNCTIONAL ASSESSMENT: PAIN_FUNCTIONAL_ASSESSMENT: PREVENTS OR INTERFERES SOME ACTIVE ACTIVITIES AND ADLS

## 2024-11-29 ASSESSMENT — PAIN DESCRIPTION - LOCATION: LOCATION: HEAD

## 2024-11-29 ASSESSMENT — PAIN DESCRIPTION - DESCRIPTORS: DESCRIPTORS: ACHING

## 2024-11-29 NOTE — CONSULTS
Infectious Diseases   Consult Note      Reason for Consult: cholecystitis and bacteremia    Requesting Physician:         Date of Admission: 11/26/2024    Subjective:   CHIEF COMPLAINT:  none given       HPI:    Alessia Singleton is an 84yoF with history of blindness, depression, anxiety, afib, GERD, HTN, asthma   Patricio TKA    ED 11/26/24 with acute chest and epigastric pain   WBC was 16, lactic was 2.9, Tbili was 1.9  CT ap with LLL airspace opacity, no other acute intra-abd pathology.   NSVT in ED with QT prolongation   She was admitted with working diagnosis pna, started on                 RUQ US was ordered consistent w acute cholecystitis   Gen Sgy was consulted and are following     ID is consulted re positive BC, 1 of 2 sets collected on admission with Pseudomonas    She is AF on RA   WBC  is wnl today   She has been very confused, agitated at times in the last 24 hours.   Nausea endorsed.  Diarrhea denied.        Current abx:  Cefepime 2g q12   Flagyl 500 IV q8        Past Surgical History:       Diagnosis Date    Arrhythmia     Arthritis     OSTEOARTHRITIS    Asthma     Atrial fibrillation (HCC)     Diverticula of colon 10/12/2015    GERD (gastroesophageal reflux disease)     Hypertension     IBS (irritable bowel syndrome) 2010         Procedure Laterality Date    COLONOSCOPY      1994 OCT 12,2015 (diverticulosis)    HYSTERECTOMY (CERVIX STATUS UNKNOWN)      JOINT REPLACEMENT      PATRICIO TKR    KNEE SURGERY           Social History:    TOBACCO:   reports that she quit smoking about 49 years ago. Her smoking use included cigarettes. She started smoking about 51 years ago. She has a 0.5 pack-year smoking history. She has never used smokeless tobacco.  ETOH:   reports no history of alcohol use.  There is no history of illicit drug use or other significant epidemiologic exposures.      Family History:       Problem Relation Age of Onset    Heart Disease Mother     Other Father     Other Sister     Cancer Brother   treatment.  Therapy requires intensive monitoring for antimicrobial agent toxicity         Agustina Rosado M.D.    Thank you for the opportunity to participate in the care of your patient.    Please do not hesitate to contact me:   [phone] office

## 2024-11-29 NOTE — PROGRESS NOTES
[]Hospice  []Other -    Anticipated Discharge Day/Date: > 2 days  Barriers to Discharge: surgical plan  --------------------------------------------------    MDM (any 2 required for High level billing)    A. Problems (any 1)  [x] Acute/Chronic Illness/injury posing ongoing threat to life and/or bodily function without ongoing treatment: Cholecystitis, bacteremia  [] Severe exacerbation of chronic illness    --------------------------------------------------  B. Risk of Treatment (any 1)    [x] Drugs/treatments that require intensive monitoring for toxicity    [x] IV ABX (Vancomycin, Aminoglycosides, etc)     [] Post-Cath/Contrast study requiring serial monitoring    [] IV Narcotic analgesia    [] Aggressive IV diuresis    [] Hypertonic Saline    [x] Critical electrolyte abnormalities requiring IV replacement    [] Insulin - Scheduled/SSI or Insulin gtt    [] Anticoagulation (Heparin gtt or Coumadin - other anticoagulants in special circumstances)    [] Cardiac Medications (IV Amiodarone/Diltiazem, Tikosyn, etc)    [] Hemodialysis    [] Other -    [] Change in code status    [] Decision to escalate care    [] Major surgery/procedure with associated risk factors    --------------------------------------------------  C. Data (any 2)    [x] Data Review (any 3)    [x] Consultant notes from yesterday/today    [x] All available current labs reviewed interpreted for clinical significance    [x] Appropriate follow-up labs were ordered  [] Collateral history obtained     [] Independent Interpretation of tests (any 1)    [] Telemetry (Rhythm Strip) personally reviewed and interpreted        [] Imaging personally reviewed and interpreted     [x] Discussion (any 1)  [x] Multi-Disciplinary Rounds with Case Management  [] Discussed management of the case with           Labs:  Personally reviewed on 11/29/2024 and interpreted for clinical significance as documented above.     Recent Labs     11/27/24  0107 11/28/24  1168

## 2024-11-29 NOTE — CONSULTS
Consult Placed     Who: Dr. Rosado, Infectious Disease   Date:  Time:     Electronically signed by Arianna Singer on 11/29/2024 at 1:52 PM

## 2024-11-29 NOTE — PLAN OF CARE
Problem: Discharge Planning  Goal: Discharge to home or other facility with appropriate resources  Outcome: Progressing     Problem: Pain  Goal: Verbalizes/displays adequate comfort level or baseline comfort level  11/29/2024 0300 by Siri Bentley, RN  Outcome: Progressing  11/28/2024 1458 by Chelsie Ryan, RN  Outcome: Progressing

## 2024-11-29 NOTE — CONSULTS
Department of General Surgery Consult    PATIENT NAME: Annie Singleton   YOB: 1940    ADMISSION DATE: 11/26/2024  7:43 AM      TODAY'S DATE: 11/29/2024    Reason for Consult:  acute cholecystitis    Chief Complaint: nausea    Historian: patient and family    Requesting Practitioner:  Tay    HISTORY OF PRESENT ILLNESS:              The patient is a 84 y.o. female who presents with nausea, chest and abd pain.  Started two days ago.  Similar less severe pain in the past.  Currently with nausea and pain though improved persists.  Nof oliver or chills    Past Medical History:        Diagnosis Date    Arrhythmia     Arthritis     OSTEOARTHRITIS    Asthma     Atrial fibrillation (HCC)     Diverticula of colon 10/12/2015    GERD (gastroesophageal reflux disease)     Hypertension     IBS (irritable bowel syndrome) 2010       Past Surgical History:        Procedure Laterality Date    COLONOSCOPY      1994 OCT 12,2015 (diverticulosis)    HYSTERECTOMY (CERVIX STATUS UNKNOWN)      JOINT REPLACEMENT      PATRICIO TKR    KNEE SURGERY         Current Medications:   Current Facility-Administered Medications: metroNIDAZOLE (FLAGYL) 500 mg in 0.9% NaCl 100 mL IVPB premix, 500 mg, IntraVENous, Q8H  magnesium sulfate 2000 mg in 50 mL IVPB premix, 2,000 mg, IntraVENous, PRN  potassium chloride (KLOR-CON M) extended release tablet 40 mEq, 40 mEq, Oral, PRN **OR** potassium bicarb-citric acid (EFFER-K) effervescent tablet 40 mEq, 40 mEq, Oral, PRN **OR** potassium chloride 10 mEq/100 mL IVPB (Peripheral Line), 10 mEq, IntraVENous, PRN  pantoprazole (PROTONIX) tablet 40 mg, 40 mg, Oral, BID AC  gabapentin (NEURONTIN) capsule 100 mg, 100 mg, Oral, TID  fluticasone (FLOVENT HFA) 110 MCG/ACT inhaler 1 puff, 1 puff, Inhalation, Daily PRN  melatonin tablet 4.5 mg, 4.5 mg, Oral, Nightly PRN  methocarbamol (ROBAXIN) tablet 500 mg, 500 mg, Oral, TID PRN  morphine (PF) injection 1 mg, 1 mg, IntraVENous, Q4H PRN **OR** morphine

## 2024-11-29 NOTE — PROGRESS NOTES
Peripherally following.  Telemetry with rate controlled A-fib.  Occasionally A-fib with slow ventricular prominence in the 50s.  Otherwise in the 60s.  Recommend repeat ECG to assess QT.  Continue replete electrolytes and treat underlying infection as you are doing.  Cardiology to peripherally follow over the weekend.

## 2024-11-29 NOTE — PLAN OF CARE
Problem: Discharge Planning  Goal: Discharge to home or other facility with appropriate resources  11/29/2024 1607 by Vicenta Mccollum RN  Outcome: Progressing  11/29/2024 0300 by Siri Bentley RN  Outcome: Progressing     Problem: Pain  Goal: Verbalizes/displays adequate comfort level or baseline comfort level  11/29/2024 1607 by Vicenta Mccollum RN  Outcome: Progressing  11/29/2024 0300 by Siri Bentley RN  Outcome: Progressing     Problem: Safety - Adult  Goal: Free from fall injury  11/29/2024 1607 by Vicenta Mccollum RN  Outcome: Progressing  11/29/2024 0300 by Siri Bentley RN  Outcome: Progressing     Problem: Skin/Tissue Integrity  Goal: Absence of new skin breakdown  Description: 1.  Monitor for areas of redness and/or skin breakdown  2.  Assess vascular access sites hourly  3.  Every 4-6 hours minimum:  Change oxygen saturation probe site  4.  Every 4-6 hours:  If on nasal continuous positive airway pressure, respiratory therapy assess nares and determine need for appliance change or resting period.  11/29/2024 1607 by Vicenta Mccollum RN  Outcome: Progressing  11/29/2024 0300 by Siri Bentley RN  Outcome: Progressing     Problem: Respiratory - Adult  Goal: Achieves optimal ventilation and oxygenation  11/29/2024 1607 by Vicenta Mccollum RN  Outcome: Progressing  11/29/2024 0300 by Siri Bentley RN  Outcome: Progressing     Problem: Cardiovascular - Adult  Goal: Absence of cardiac dysrhythmias or at baseline  11/29/2024 1607 by Vicenta Mccollum RN  Outcome: Progressing  11/29/2024 0300 by Siri Bentley RN  Outcome: Progressing     Problem: Skin/Tissue Integrity - Adult  Goal: Skin integrity remains intact  11/29/2024 1607 by Vicenta Mccollum RN  Outcome: Progressing  11/29/2024 0300 by Siri Bentley RN  Outcome: Progressing     Problem: Genitourinary - Adult  Goal: Absence of urinary retention  11/29/2024 1607 by Vicenta Mccollum RN  Outcome: Progressing  11/29/2024 0300 by Siri Bentley

## 2024-11-29 NOTE — CARE COORDINATION
Patient from [facility]. Plan is to return. PT/OT recommendations are for SNF.Medicare patient , no pre-cert to return. Call placed to [facility] in admissions to confirm if patient was long term there or skilled. Initial assessment states she resides there. Left [facility] a vm. Patient with acute cholecystitis and is being followed by general surgery. If symptoms persist may need surgery this admission. Will follow.     Tentative surgery planned for Monday. Confirmed patient is LTC.

## 2024-11-30 LAB
ANION GAP SERPL CALCULATED.3IONS-SCNC: 11 MMOL/L (ref 3–16)
BACTERIA BLD CULT ORG #2: NORMAL
BUN SERPL-MCNC: 21 MG/DL (ref 7–20)
CALCIUM SERPL-MCNC: 9.4 MG/DL (ref 8.3–10.6)
CHLORIDE SERPL-SCNC: 99 MMOL/L (ref 99–110)
CO2 SERPL-SCNC: 28 MMOL/L (ref 21–32)
CREAT SERPL-MCNC: 0.9 MG/DL (ref 0.6–1.2)
DEPRECATED RDW RBC AUTO: 13.3 % (ref 12.4–15.4)
EKG DIAGNOSIS: NORMAL
EKG Q-T INTERVAL: 394 MS
EKG QRS DURATION: 96 MS
EKG QTC CALCULATION (BAZETT): 390 MS
EKG R AXIS: -24 DEGREES
EKG T AXIS: -33 DEGREES
EKG VENTRICULAR RATE: 59 BPM
GFR SERPLBLD CREATININE-BSD FMLA CKD-EPI: 63 ML/MIN/{1.73_M2}
GLUCOSE SERPL-MCNC: 105 MG/DL (ref 70–99)
HCT VFR BLD AUTO: 27.1 % (ref 36–48)
HGB BLD-MCNC: 9.3 G/DL (ref 12–16)
MCH RBC QN AUTO: 32.2 PG (ref 26–34)
MCHC RBC AUTO-ENTMCNC: 34.3 G/DL (ref 31–36)
MCV RBC AUTO: 93.9 FL (ref 80–100)
PLATELET # BLD AUTO: 208 K/UL (ref 135–450)
PMV BLD AUTO: 8.2 FL (ref 5–10.5)
POTASSIUM SERPL-SCNC: 3.8 MMOL/L (ref 3.5–5.1)
RBC # BLD AUTO: 2.89 M/UL (ref 4–5.2)
SODIUM SERPL-SCNC: 138 MMOL/L (ref 136–145)
WBC # BLD AUTO: 5.4 K/UL (ref 4–11)

## 2024-11-30 PROCEDURE — 6370000000 HC RX 637 (ALT 250 FOR IP): Performed by: NURSE PRACTITIONER

## 2024-11-30 PROCEDURE — 80048 BASIC METABOLIC PNL TOTAL CA: CPT

## 2024-11-30 PROCEDURE — 99232 SBSQ HOSP IP/OBS MODERATE 35: CPT | Performed by: SURGERY

## 2024-11-30 PROCEDURE — 85027 COMPLETE CBC AUTOMATED: CPT

## 2024-11-30 PROCEDURE — 2060000000 HC ICU INTERMEDIATE R&B

## 2024-11-30 PROCEDURE — 6370000000 HC RX 637 (ALT 250 FOR IP)

## 2024-11-30 PROCEDURE — 2580000003 HC RX 258: Performed by: INTERNAL MEDICINE

## 2024-11-30 PROCEDURE — 6370000000 HC RX 637 (ALT 250 FOR IP): Performed by: INTERNAL MEDICINE

## 2024-11-30 PROCEDURE — 97530 THERAPEUTIC ACTIVITIES: CPT

## 2024-11-30 PROCEDURE — 6360000002 HC RX W HCPCS: Performed by: INTERNAL MEDICINE

## 2024-11-30 PROCEDURE — 97110 THERAPEUTIC EXERCISES: CPT

## 2024-11-30 PROCEDURE — 97116 GAIT TRAINING THERAPY: CPT

## 2024-11-30 PROCEDURE — 51702 INSERT TEMP BLADDER CATH: CPT

## 2024-11-30 PROCEDURE — 93010 ELECTROCARDIOGRAM REPORT: CPT | Performed by: INTERNAL MEDICINE

## 2024-11-30 RX ORDER — FLUTICASONE PROPIONATE 50 MCG
1 SPRAY, SUSPENSION (ML) NASAL DAILY
Status: DISCONTINUED | OUTPATIENT
Start: 2024-11-30 | End: 2024-12-06 | Stop reason: HOSPADM

## 2024-11-30 RX ORDER — CETIRIZINE HYDROCHLORIDE 10 MG/1
10 TABLET ORAL DAILY
Status: DISCONTINUED | OUTPATIENT
Start: 2024-11-30 | End: 2024-12-06 | Stop reason: HOSPADM

## 2024-11-30 RX ADMIN — METHOCARBAMOL 500 MG: 500 TABLET ORAL at 20:06

## 2024-11-30 RX ADMIN — ALPRAZOLAM 0.5 MG: 0.25 TABLET ORAL at 06:11

## 2024-11-30 RX ADMIN — MEROPENEM 1000 MG: 1 INJECTION INTRAVENOUS at 00:09

## 2024-11-30 RX ADMIN — PANTOPRAZOLE SODIUM 40 MG: 40 TABLET, DELAYED RELEASE ORAL at 04:52

## 2024-11-30 RX ADMIN — METHOCARBAMOL 500 MG: 500 TABLET ORAL at 06:10

## 2024-11-30 RX ADMIN — MEROPENEM 1000 MG: 1 INJECTION INTRAVENOUS at 17:36

## 2024-11-30 RX ADMIN — SODIUM CHLORIDE, PRESERVATIVE FREE 10 ML: 5 INJECTION INTRAVENOUS at 08:43

## 2024-11-30 RX ADMIN — MEROPENEM 1000 MG: 1 INJECTION INTRAVENOUS at 23:17

## 2024-11-30 RX ADMIN — Medication: at 13:41

## 2024-11-30 RX ADMIN — PANTOPRAZOLE SODIUM 40 MG: 40 TABLET, DELAYED RELEASE ORAL at 17:33

## 2024-11-30 RX ADMIN — GABAPENTIN 100 MG: 100 CAPSULE ORAL at 08:41

## 2024-11-30 RX ADMIN — GABAPENTIN 100 MG: 100 CAPSULE ORAL at 20:06

## 2024-11-30 RX ADMIN — ATORVASTATIN CALCIUM 40 MG: 40 TABLET, FILM COATED ORAL at 20:06

## 2024-11-30 RX ADMIN — GABAPENTIN 100 MG: 100 CAPSULE ORAL at 17:33

## 2024-11-30 RX ADMIN — CETIRIZINE HYDROCHLORIDE 10 MG: 10 TABLET, FILM COATED ORAL at 20:42

## 2024-11-30 RX ADMIN — ENOXAPARIN SODIUM 30 MG: 100 INJECTION SUBCUTANEOUS at 08:42

## 2024-11-30 RX ADMIN — Medication 4.5 MG: at 20:06

## 2024-11-30 RX ADMIN — ACETAMINOPHEN 650 MG: 325 TABLET ORAL at 04:50

## 2024-11-30 RX ADMIN — FLUTICASONE PROPIONATE 1 SPRAY: 50 SPRAY, METERED NASAL at 20:42

## 2024-11-30 RX ADMIN — ASPIRIN 81 MG: 81 TABLET, CHEWABLE ORAL at 08:41

## 2024-11-30 RX ADMIN — ENOXAPARIN SODIUM 30 MG: 100 INJECTION SUBCUTANEOUS at 20:06

## 2024-11-30 RX ADMIN — MEROPENEM 1000 MG: 1 INJECTION INTRAVENOUS at 08:54

## 2024-11-30 ASSESSMENT — PAIN DESCRIPTION - DESCRIPTORS: DESCRIPTORS: ACHING

## 2024-11-30 ASSESSMENT — PAIN - FUNCTIONAL ASSESSMENT: PAIN_FUNCTIONAL_ASSESSMENT: PREVENTS OR INTERFERES SOME ACTIVE ACTIVITIES AND ADLS

## 2024-11-30 ASSESSMENT — PAIN SCALES - GENERAL
PAINLEVEL_OUTOF10: 0
PAINLEVEL_OUTOF10: 0
PAINLEVEL_OUTOF10: 3

## 2024-11-30 ASSESSMENT — PAIN DESCRIPTION - LOCATION: LOCATION: BACK

## 2024-11-30 NOTE — PROGRESS NOTES
Comprehensive Nutrition Assessment    Type and Reason for Visit:  Initial, Wound    Nutrition Recommendations/Plan:   Liberalize diet to soft and bite sized, NANETTE, low fat   Add Ensure HP daily   Encourage PO intakes   Monitor nutrition adequacy, pertinent labs, bowel habits, wt changes, and clinical progress     Malnutrition Assessment:  Malnutrition Status:  At risk for malnutrition (11/30/24 1023)    Context:  Acute Illness     Findings of the 6 clinical characteristics of malnutrition:  Energy Intake:  Mild decrease in energy intake  Weight Loss:  No weight loss     Body Fat Loss:  No body fat loss     Muscle Mass Loss:  No muscle mass loss    Fluid Accumulation:  Mild Generalized   Strength:  Not Performed    Nutrition Assessment:    Pt admitted with chest pain and cholecystitis. Hx of GERD, HTN, Afib, and IBS. General surgery following. Pt nutritionally compromised AEB wound. Pt confused. Family at bedside reports pt with decreased PO intakes PTA. Pt reports feeling hungry today. Encouraged PO intakes. Recommend liberalizing diet to allow increased meal options. Drinking ONS at NH, RD to resume. Denies any further questions or concerns at this time. Will monitor.    Nutrition Related Findings:    BM today. Active BS. +2 generalized edema. Labs reviewed. Wound Type: Pressure Injury, Stage II       Current Nutrition Intake & Therapies:    Average Meal Intake: 51-75%, %  Average Supplements Intake: None Ordered  ADULT DIET; Dysphagia - Soft and Bite Sized; Low Fat/Low Chol/High Fiber/2 gm Na    Anthropometric Measures:  Height: 165.1 cm (5' 5\")  Ideal Body Weight (IBW): 125 lbs (57 kg)       Current Body Weight: 117.5 kg (259 lb), 207.2 % IBW. Weight Source: Bed scale  Current BMI (kg/m2): 43.1  Usual Body Weight:  (no weight loss noted in EMR)                          BMI Categories: Obese Class 3 (BMI 40.0 or greater)    Estimated Daily Nutrient Needs:  Energy Requirements Based On: Kcal/kg

## 2024-11-30 NOTE — PROGRESS NOTES
place;Oriented to situation;Oriented to time;Oriented X4  Cognition  Overall Cognitive Status: Exceptions  Arousal/Alertness: Appears intact  Following Commands: Follows one step commands consistently (limited by visual impairment, at times requires multiple cues)  Attention Span: Appears intact  Memory: Decreased short term memory;Decreased recall of recent events;Impaired  Safety Judgement: Appears intact  Problem Solving: Assistance required to identify errors made  Insights: Appears intact  Initiation: Appears intact  Sequencing: Appears intact       Objective  Vitals  Vitals  Pulse: 83  SpO2: 95 %  O2 Device: None (Room air)  BP: 119/71  MAP (Calculated): 87  BP Location: Left lower arm  BP Method: Automatic  Patient Position: Semi fowlers        ADL  Toileting: Dependent/Total  Toileting Skilled Clinical Factors: purewick  OT Exercises  A/AROM Exercises: 2x15 reps of BUE grasp/release, wrist flexion/extension, pronation/supination, elbow flexion/extension, shld flexion, shld press, and chest press     Safety Devices  Type of Devices: Nurse notified;All fall risk precautions in place;Call light within reach;Gait belt;Left in bed;Bed alarm in place  Restraints  Restraints Initially in Place: No    Patient Education  Education Given To: Patient  Education Provided: Role of Therapy;Transfer Training;Plan of Care;Equipment;Precautions;Orientation;Mobility Training  Education Provided Comments: Pt educated on importance of OOB mobility, prevention of complications of bedrest, and general safety during hospitalization  Education Method: Demonstration;Verbal  Barriers to Learning: Vision  Education Outcome: Verbalized understanding;Continued education needed    Goals  Short Term Goals  Time Frame for Short Term Goals: 12/05, unless otherwise noted  Short Term Goal 1: Pt will complete functional transfer/toilet transfer with CGA  Short Term Goal 2: Pt will complete toileting with CGA  Short Term Goal 3: Pt will

## 2024-11-30 NOTE — PLAN OF CARE
Problem: Discharge Planning  Goal: Discharge to home or other facility with appropriate resources  Outcome: Progressing     Problem: Pain  Goal: Verbalizes/displays adequate comfort level or baseline comfort level  Outcome: Progressing     Problem: Safety - Adult  Goal: Free from fall injury  Outcome: Progressing     Problem: Skin/Tissue Integrity  Goal: Absence of new skin breakdown  Description: 1.  Monitor for areas of redness and/or skin breakdown  2.  Assess vascular access sites hourly  3.  Every 4-6 hours minimum:  Change oxygen saturation probe site  4.  Every 4-6 hours:  If on nasal continuous positive airway pressure, respiratory therapy assess nares and determine need for appliance change or resting period.  Outcome: Progressing     Problem: Respiratory - Adult  Goal: Achieves optimal ventilation and oxygenation  Outcome: Progressing     Problem: Cardiovascular - Adult  Goal: Absence of cardiac dysrhythmias or at baseline  Outcome: Progressing     Problem: Skin/Tissue Integrity - Adult  Goal: Skin integrity remains intact  Outcome: Progressing     Problem: Genitourinary - Adult  Goal: Absence of urinary retention  Outcome: Progressing     Problem: Metabolic/Fluid and Electrolytes - Adult  Goal: Electrolytes maintained within normal limits  Outcome: Progressing     Problem: Nutrition Deficit:  Goal: Optimize nutritional status  Outcome: Progressing

## 2024-11-30 NOTE — PROGRESS NOTES
Cardiology peripherally following.  Telemetry reviewed.  Telemetry reviewed, rate controlled A-fib while awake in the 60s.  Was sleeping A-fib with slow ventricular response to 50s.  Repeat EKG yesterday showed A-fib with rates around 59-60.  Nonspecific T wave abnormality inferiorly.  Most importantly QT is normalized and shortened to ~390 ms.  Will have EP reevaluate Monday.

## 2024-11-30 NOTE — PROGRESS NOTES
[facility] Medicine Progress Note  V 10.25      Date of Admission: 11/26/2024    Hospital Day: 5      Chief Admission Complaint:  Chest Pain (Pt from [facility]..pt is dnr-cc..Pt to er with cp , but pointing more towards belly. Pt is in afib with hx of same.. per squad pt was taken off thinner for a procedure.) and Abdominal Pain    Subjective:      Resting in bed, NAD, updated family at bedside    Presenting Admission History:       84 y.o. female who presented to [facility] with above complaint.  PMHx significant for legally blind, depression, anxiety atrial fibrillation GERD hypertension bronchial asthma.  She lives in nursing home  Developed chest pain and epigastric abdominal pain this morning  Chest pain was in mid chest, 7 in intensity not associated nausea vomiting or shortness of breath.  She has epigastric and left upper quadrant pain which started the same time with a chest pain  Currently she does not have chest pain or abdominal pain  On 6 L oxygen, she told she had cough yesterday not sure about the sputum, no fever  No change in mental status or any focal neurological symptoms  She denies dizziness or syncope or palpitation   she normally walks with a walker.    She has poor short-term memory   History is from patient ER physician and daughters    Assessment/Plan:      Current Principal Problem:  Chest pain    Chest pain - Initially concerning for ACS but possibly secondary to multiple episodes of VTACH in ED, resolved  Initial Troponin 28 ---> 32.    EKG AFIB rate 75.    - Followed serial Troponins, reviewed and documented, and monitor on tele - currently w/out evidence of ischemia and/or arrhythmia.    - Continue aspirin 81 mg daily  - Continue Lipitor 40 mg daily      Troponin elevation - c/w myocardial injury 2nd to      [] STEMI  [] NSTEMI  [] Acute/Chronic Myocardial Injury 2nd to CHF  [] Recent MI (with 4 weeks of admission) dated:   [] Type II MI w/ demand ischemia  [] Demand ischemia  Rehab    Multi-Disciplinary Rounds with Case Management completed on 11/30/2024 with the following recommendations:  Anticipated Discharge Location: []Home w/ []HHC vs [x]SNF  []Acute Rehab  []LTAC  []Hospice  []Other -    Anticipated Discharge Day/Date: > 2 days  Barriers to Discharge: surgical plan  --------------------------------------------------    MDM (any 2 required for High level billing)    A. Problems (any 1)  [x] Acute/Chronic Illness/injury posing ongoing threat to life and/or bodily function without ongoing treatment: Cholecystitis, bacteremia  [] Severe exacerbation of chronic illness    --------------------------------------------------  B. Risk of Treatment (any 1)    [x] Drugs/treatments that require intensive monitoring for toxicity    [x] IV ABX (Vancomycin, Aminoglycosides, etc)   Merrem  [] Post-Cath/Contrast study requiring serial monitoring    [] IV Narcotic analgesia    [] Aggressive IV diuresis    [] Hypertonic Saline    [x] Critical electrolyte abnormalities requiring IV replacement    [] Insulin - Scheduled/SSI or Insulin gtt    [] Anticoagulation (Heparin gtt or Coumadin - other anticoagulants in special circumstances)    [] Cardiac Medications (IV Amiodarone/Diltiazem, Tikosyn, etc)    [] Hemodialysis    [] Other -    [] Change in code status    [] Decision to escalate care    [] Major surgery/procedure with associated risk factors    --------------------------------------------------  C. Data (any 2)    [x] Data Review (any 3)    [x] Consultant notes from yesterday/today    [x] All available current labs reviewed interpreted for clinical significance    [x] Appropriate follow-up labs were ordered  [] Collateral history obtained     [] Independent Interpretation of tests (any 1)    [] Telemetry (Rhythm Strip) personally reviewed and interpreted        [] Imaging personally reviewed and interpreted     [x] Discussion (any 1)  [x] Multi-Disciplinary Rounds with Case Management  []

## 2024-11-30 NOTE — PLAN OF CARE
Problem: Discharge Planning  Goal: Discharge to home or other facility with appropriate resources  11/29/2024 2231 by Siri Bentley, RN  Outcome: Progressing  11/29/2024 1607 by Vicenta Mccollum, RN  Outcome: Progressing

## 2024-11-30 NOTE — PROGRESS NOTES
[facility] SURGERY    PATIENT NAME: Annie Singleton     TODAY'S DATE: 11/30/2024    CHIEF COMPLAINT: none    INTERVAL HISTORY/HPI:    Pt without pain today in abdomen, denies nausea, no emesis recorded no fevers.     REVIEW OF SYSTEMS:  Pertinent positives and negatives as per interval history section    OBJECTIVE:  VITALS:  /77   Pulse 68   Temp 98.3 °F (36.8 °C) (Axillary)   Resp 18   Ht 1.651 m (5' 5\")   Wt 117.6 kg (259 lb 4.2 oz)   SpO2 97%   BMI 43.14 kg/m²     INTAKE/OUTPUT:    I/O last 3 completed shifts:  In: 960 [P.O.:960]  Out: 1975 [Urine:1975]  No intake/output data recorded.    CONSTITUTIONAL:  awake and alert  LUNGS:  Respirations easy and unlabored, no crackles or wheezing  CARD:  regular rate and rhythm  ABDOMEN:  normal bowel sounds, soft, non-distended, non-tender     Data:  CBC:   Recent Labs     11/28/24  0545 11/29/24  0310 11/30/24  0424   WBC 7.5 7.1 5.4   HGB 11.2* 11.2* 9.3*   HCT 32.5* 32.8* 27.1*    185 208     BMP:    Recent Labs     11/28/24  0545 11/28/24  1540 11/29/24  0310 11/29/24  1245 11/30/24  0424     --  138  --  138   K 2.5*   < > 3.1* 3.3* 3.8   CL 94*  --  96*  --  99   CO2 31  --  29  --  28   BUN 37*  --  30*  --  21*   CREATININE 1.3*  --  1.1  --  0.9   GLUCOSE 111*  --  115*  --  105*    < > = values in this interval not displayed.     Hepatic:   Recent Labs     11/29/24  1245   AST 19   ALT 8*   BILITOT 1.0   ALKPHOS 80     Mag:      Recent Labs     11/28/24  0545 11/29/24  0310   MG 2.12 2.29      Phos:   No results for input(s): \"PHOS\" in the last 72 hours.   INR: No results for input(s): \"INR\" in the last 72 hours.    Radiology Review:  *Imaging personally reviewed by me.   NA      ASSESSMENT AND PLAN:  83 yo with acute cholecystitis  Symptoms improved and without leukocytosis  Diet as tolerated  Possible OR monday     Electronically signed by Zachary Escobar MD     [id number]

## 2024-11-30 NOTE — PROGRESS NOTES
Order to place indwelling urinary catheter. Procedure explained to patient, patient verbalized understanding. 16Fr flores placed with sterile technique per protocol, no complications, patient tolerated well. 10ml saline inserted into balloon. Leg stat lock in place, all standard safety measures in place. Anai care and CHG completed.

## 2024-11-30 NOTE — PROGRESS NOTES
Physical Therapy  Facility/Department: [facility] C4 PCU  Daily Treatment Note  NAME: Annie Singleton  : 1940  MRN: [id number]    Date of Service: 2024    Discharge Recommendations:  Subacute/Skilled Nursing Facility   PT Equipment Recommendations  Equipment Needed: No  Other: defer    Barriers to Home Discharge:   [] Steps to access home entry or bed/bath:   [x] Unable to transfer, ambulate, or propel wheelchair household distances without assist   [x] Limited available assist at home upon discharge    [x] Patient or family requests d/c to post-acute facility    [] Poor cognition/safety awareness for d/c to home alone    [] Unable to maintain ordered weight bearing status    [] Patient with salient signs of long-standing immobility   [x] Decreased independence with ADLs   [x] Increased risk for falls   [] Other:     If pt is unable to be seen after this session, please let this note serve as discharge summary.  Please see case management note for discharge disposition.  Thank you.    Patient Diagnosis(es): The primary encounter diagnosis was Nonsustained ventricular tachycardia (HCC). Diagnoses of Elevated troponin, QT prolongation, Leukocytosis, unspecified type, Pneumonia due to infectious organism, unspecified laterality, unspecified part of lung, and Acute cholecystitis were also pertinent to this visit.    Assessment  Assessment: Pt tolerated treatment session well today. Pt motivated and pleasant to work with. Dtr in room for session. Pt demo bed mobility Min A for sup > sit EOB as well as scooting MAGDY, pt was Max Ax2 to get back into bed 2/2 fatigue. Pt demo'ed functional mobility Mod A for STS fro EOB > RW, but Mod A for toilet transfer. Pt was Max Ax2 for pericare at this time, pt completes 0/3 pericare. Pt demo ambulation Min A w/ RW for RW managment secondary to visual defiicts, for 10ft x2 to and from bathroom. Pt req assist for line management at this time. Pt has decreased cog throughout

## 2024-12-01 LAB
ANION GAP SERPL CALCULATED.3IONS-SCNC: 9 MMOL/L (ref 3–16)
BUN SERPL-MCNC: 15 MG/DL (ref 7–20)
CALCIUM SERPL-MCNC: 8.8 MG/DL (ref 8.3–10.6)
CHLORIDE SERPL-SCNC: 98 MMOL/L (ref 99–110)
CO2 SERPL-SCNC: 27 MMOL/L (ref 21–32)
CREAT SERPL-MCNC: 1 MG/DL (ref 0.6–1.2)
GFR SERPLBLD CREATININE-BSD FMLA CKD-EPI: 56 ML/MIN/{1.73_M2}
GLUCOSE SERPL-MCNC: 130 MG/DL (ref 70–99)
MAGNESIUM SERPL-MCNC: 2.06 MG/DL (ref 1.8–2.4)
POTASSIUM SERPL-SCNC: 3.4 MMOL/L (ref 3.5–5.1)
SODIUM SERPL-SCNC: 134 MMOL/L (ref 136–145)

## 2024-12-01 PROCEDURE — 80048 BASIC METABOLIC PNL TOTAL CA: CPT

## 2024-12-01 PROCEDURE — 6360000002 HC RX W HCPCS: Performed by: INTERNAL MEDICINE

## 2024-12-01 PROCEDURE — 83735 ASSAY OF MAGNESIUM: CPT

## 2024-12-01 PROCEDURE — 99232 SBSQ HOSP IP/OBS MODERATE 35: CPT | Performed by: SURGERY

## 2024-12-01 PROCEDURE — 6370000000 HC RX 637 (ALT 250 FOR IP): Performed by: NURSE PRACTITIONER

## 2024-12-01 PROCEDURE — 2580000003 HC RX 258: Performed by: INTERNAL MEDICINE

## 2024-12-01 PROCEDURE — 2060000000 HC ICU INTERMEDIATE R&B

## 2024-12-01 PROCEDURE — 6370000000 HC RX 637 (ALT 250 FOR IP): Performed by: INTERNAL MEDICINE

## 2024-12-01 RX ADMIN — ENOXAPARIN SODIUM 30 MG: 100 INJECTION SUBCUTANEOUS at 08:50

## 2024-12-01 RX ADMIN — ASPIRIN 81 MG: 81 TABLET, CHEWABLE ORAL at 08:50

## 2024-12-01 RX ADMIN — CETIRIZINE HYDROCHLORIDE 10 MG: 10 TABLET, FILM COATED ORAL at 08:50

## 2024-12-01 RX ADMIN — GABAPENTIN 100 MG: 100 CAPSULE ORAL at 17:37

## 2024-12-01 RX ADMIN — SODIUM CHLORIDE, PRESERVATIVE FREE 10 ML: 5 INJECTION INTRAVENOUS at 08:50

## 2024-12-01 RX ADMIN — POTASSIUM CHLORIDE 40 MEQ: 1500 TABLET, EXTENDED RELEASE ORAL at 08:48

## 2024-12-01 RX ADMIN — FLUTICASONE PROPIONATE 1 SPRAY: 50 SPRAY, METERED NASAL at 08:50

## 2024-12-01 RX ADMIN — ALUMINUM HYDROXIDE, MAGNESIUM HYDROXIDE, AND SIMETHICONE: 1200; 120; 1200 SUSPENSION ORAL at 22:22

## 2024-12-01 RX ADMIN — GABAPENTIN 100 MG: 100 CAPSULE ORAL at 19:29

## 2024-12-01 RX ADMIN — Medication: at 17:42

## 2024-12-01 RX ADMIN — MEROPENEM 1000 MG: 1 INJECTION INTRAVENOUS at 08:56

## 2024-12-01 RX ADMIN — PANTOPRAZOLE SODIUM 40 MG: 40 TABLET, DELAYED RELEASE ORAL at 05:10

## 2024-12-01 RX ADMIN — ENOXAPARIN SODIUM 30 MG: 100 INJECTION SUBCUTANEOUS at 19:30

## 2024-12-01 RX ADMIN — MEROPENEM 1000 MG: 1 INJECTION INTRAVENOUS at 17:41

## 2024-12-01 RX ADMIN — MEROPENEM 1000 MG: 1 INJECTION INTRAVENOUS at 23:33

## 2024-12-01 RX ADMIN — Medication 4.5 MG: at 19:29

## 2024-12-01 RX ADMIN — PANTOPRAZOLE SODIUM 40 MG: 40 TABLET, DELAYED RELEASE ORAL at 17:37

## 2024-12-01 RX ADMIN — ATORVASTATIN CALCIUM 40 MG: 40 TABLET, FILM COATED ORAL at 19:29

## 2024-12-01 RX ADMIN — GABAPENTIN 100 MG: 100 CAPSULE ORAL at 08:50

## 2024-12-01 RX ADMIN — ACETAMINOPHEN 650 MG: 325 TABLET ORAL at 02:32

## 2024-12-01 RX ADMIN — ACETAMINOPHEN 650 MG: 325 TABLET ORAL at 19:33

## 2024-12-01 RX ADMIN — ACETAMINOPHEN 650 MG: 325 TABLET ORAL at 08:48

## 2024-12-01 ASSESSMENT — PAIN - FUNCTIONAL ASSESSMENT: PAIN_FUNCTIONAL_ASSESSMENT: ACTIVITIES ARE NOT PREVENTED

## 2024-12-01 ASSESSMENT — PAIN SCALES - GENERAL
PAINLEVEL_OUTOF10: 3
PAINLEVEL_OUTOF10: 4
PAINLEVEL_OUTOF10: 4

## 2024-12-01 ASSESSMENT — PAIN DESCRIPTION - LOCATION
LOCATION: HEAD

## 2024-12-01 NOTE — PLAN OF CARE
Problem: Discharge Planning  Goal: Discharge to home or other facility with appropriate resources  11/30/2024 2133 by Izabela Echeverria RN  Outcome: Progressing  11/30/2024 1852 by Vicenta Mccollum RN  Outcome: Progressing     Problem: Pain  Goal: Verbalizes/displays adequate comfort level or baseline comfort level  11/30/2024 2133 by Izabela Echeverria RN  Outcome: Progressing  11/30/2024 1852 by Vicenta Mccollum RN  Outcome: Progressing     Problem: Safety - Adult  Goal: Free from fall injury  11/30/2024 2133 by Izabela Echeverria RN  Outcome: Progressing  11/30/2024 1852 by Vicenta Mccollum RN  Outcome: Progressing     Problem: Skin/Tissue Integrity  Goal: Absence of new skin breakdown  Description: 1.  Monitor for areas of redness and/or skin breakdown  2.  Assess vascular access sites hourly  3.  Every 4-6 hours minimum:  Change oxygen saturation probe site  4.  Every 4-6 hours:  If on nasal continuous positive airway pressure, respiratory therapy assess nares and determine need for appliance change or resting period.  11/30/2024 2133 by Izabela Echeverria RN  Outcome: Progressing  11/30/2024 1852 by Vicenta Mccollum RN  Outcome: Progressing     Problem: Respiratory - Adult  Goal: Achieves optimal ventilation and oxygenation  11/30/2024 2133 by Izabela Echeverria RN  Outcome: Progressing  11/30/2024 1852 by Vicenta Mccollum RN  Outcome: Progressing     Problem: Cardiovascular - Adult  Goal: Absence of cardiac dysrhythmias or at baseline  11/30/2024 2133 by Izabela Echeverria RN  Outcome: Progressing  11/30/2024 1852 by Vicenta Mccollum RN  Outcome: Progressing     Problem: Skin/Tissue Integrity - Adult  Goal: Skin integrity remains intact  11/30/2024 2133 by Izabela Echeverria RN  Outcome: Progressing  11/30/2024 1852 by Vicenta Mccollum RN  Outcome: Progressing     Problem: Genitourinary - Adult  Goal: Absence of urinary retention  11/30/2024 1852 by Vicenta Mccollum RN  Outcome: Progressing

## 2024-12-01 NOTE — PROGRESS NOTES
[facility] Medicine Progress Note  V 10.25      Date of Admission: 11/26/2024    Hospital Day: 6      Chief Admission Complaint:  Chest Pain (Pt from [facility]..pt is dnr-cc..Pt to er with cp , but pointing more towards belly. Pt is in afib with hx of same.. per squad pt was taken off thinner for a procedure.) and Abdominal Pain    Subjective:      Resting in bed, NAD, updated family at bedside. Mild nausea with meal    Presenting Admission History:       84 y.o. female who presented to [facility] with above complaint.  PMHx significant for legally blind, depression, anxiety atrial fibrillation GERD hypertension bronchial asthma.  She lives in nursing home  Developed chest pain and epigastric abdominal pain this morning  Chest pain was in mid chest, 7 in intensity not associated nausea vomiting or shortness of breath.  She has epigastric and left upper quadrant pain which started the same time with a chest pain  Currently she does not have chest pain or abdominal pain  On 6 L oxygen, she told she had cough yesterday not sure about the sputum, no fever  No change in mental status or any focal neurological symptoms  She denies dizziness or syncope or palpitation   she normally walks with a walker.    She has poor short-term memory   History is from patient ER physician and daughters    Assessment/Plan:      Current Principal Problem:  Chest pain    Chest pain - Initially concerning for ACS but possibly secondary to multiple episodes of VTACH in ED, resolved  Initial Troponin 28 ---> 32.    EKG AFIB rate 75.    - Followed serial Troponins, reviewed and documented, and monitor on tele - currently w/out evidence of ischemia and/or arrhythmia.    - Continue aspirin 81 mg daily  - Continue Lipitor 40 mg daily      Troponin elevation - c/w myocardial injury 2nd to      [] STEMI  [] NSTEMI  [] Acute/Chronic Myocardial Injury 2nd to CHF  [] Recent MI (with 4 weeks of admission) dated:   [] Type II MI w/ demand

## 2024-12-01 NOTE — PROGRESS NOTES
[facility] SURGERY    PATIENT NAME: Annie Singleton     TODAY'S DATE: 12/1/2024    CHIEF COMPLAINT: none    INTERVAL HISTORY/HPI:    Pt with some nausea after breakfast, no emesis, no fevers or chills     REVIEW OF SYSTEMS:  Pertinent positives and negatives as per interval history section    OBJECTIVE:  VITALS:  /76   Pulse 70   Temp 98 °F (36.7 °C) (Oral)   Resp 16   Ht 1.651 m (5' 5\")   Wt 118.9 kg (262 lb 2 oz)   SpO2 95%   BMI 43.62 kg/m²     INTAKE/OUTPUT:    I/O last 3 completed shifts:  In: 1110 [P.O.:1110]  Out: 1800 [Urine:1800]  I/O this shift:  In: -   Out: 450 [Urine:450]    CONSTITUTIONAL:  awake and alert  LUNGS:  Respirations easy and unlabored, no crackles or wheezing  CARD:  regular rate and rhythm  ABDOMEN:  normal bowel sounds, soft, non-distended, non-tender     Data:  CBC:   Recent Labs     11/29/24  0310 11/30/24  0424   WBC 7.1 5.4   HGB 11.2* 9.3*   HCT 32.8* 27.1*    208     BMP:    Recent Labs     11/29/24  0310 11/29/24  1245 11/30/24  0424 12/01/24  0409     --  138 134*   K 3.1* 3.3* 3.8 3.4*   CL 96*  --  99 98*   CO2 29  --  28 27   BUN 30*  --  21* 15   CREATININE 1.1  --  0.9 1.0   GLUCOSE 115*  --  105* 130*     Hepatic:   Recent Labs     11/29/24  1245   AST 19   ALT 8*   BILITOT 1.0   ALKPHOS 80     Mag:      Recent Labs     11/29/24  0310 12/01/24  0409   MG 2.29 2.06      Phos:   No results for input(s): \"PHOS\" in the last 72 hours.   INR: No results for input(s): \"INR\" in the last 72 hours.    Radiology Review:  *Imaging personally reviewed by me.   NA      ASSESSMENT AND PLAN:  83 yo with acute cholecystitis  Given wax/wain of symptoms NPO at MN for possible OR monday  Continue abx     Electronically signed by Zachary Escobar MD     [id number]

## 2024-12-02 ENCOUNTER — APPOINTMENT (OUTPATIENT)
Dept: GENERAL RADIOLOGY | Age: 84
End: 2024-12-02
Payer: MEDICARE

## 2024-12-02 LAB
ALBUMIN SERPL-MCNC: 3.1 G/DL (ref 3.4–5)
ALBUMIN/GLOB SERPL: 1.1 {RATIO} (ref 1.1–2.2)
ALP SERPL-CCNC: 83 U/L (ref 40–129)
ALT SERPL-CCNC: 43 U/L (ref 10–40)
ANION GAP SERPL CALCULATED.3IONS-SCNC: 9 MMOL/L (ref 3–16)
AST SERPL-CCNC: 79 U/L (ref 15–37)
BILIRUB SERPL-MCNC: 0.7 MG/DL (ref 0–1)
BUN SERPL-MCNC: 13 MG/DL (ref 7–20)
CALCIUM SERPL-MCNC: 8.9 MG/DL (ref 8.3–10.6)
CHLORIDE SERPL-SCNC: 101 MMOL/L (ref 99–110)
CO2 SERPL-SCNC: 29 MMOL/L (ref 21–32)
CREAT SERPL-MCNC: 0.8 MG/DL (ref 0.6–1.2)
DEPRECATED RDW RBC AUTO: 13.6 % (ref 12.4–15.4)
GFR SERPLBLD CREATININE-BSD FMLA CKD-EPI: 73 ML/MIN/{1.73_M2}
GLUCOSE SERPL-MCNC: 122 MG/DL (ref 70–99)
HCT VFR BLD AUTO: 34.4 % (ref 36–48)
HGB BLD-MCNC: 11.5 G/DL (ref 12–16)
INR PPP: 1.09 (ref 0.85–1.15)
MCH RBC QN AUTO: 31.8 PG (ref 26–34)
MCHC RBC AUTO-ENTMCNC: 33.3 G/DL (ref 31–36)
MCV RBC AUTO: 95.3 FL (ref 80–100)
PLATELET # BLD AUTO: 233 K/UL (ref 135–450)
PMV BLD AUTO: 7.9 FL (ref 5–10.5)
POTASSIUM SERPL-SCNC: 3.8 MMOL/L (ref 3.5–5.1)
PROT SERPL-MCNC: 5.8 G/DL (ref 6.4–8.2)
PROTHROMBIN TIME: 14.3 SEC (ref 11.9–14.9)
RBC # BLD AUTO: 3.61 M/UL (ref 4–5.2)
SODIUM SERPL-SCNC: 139 MMOL/L (ref 136–145)
WBC # BLD AUTO: 7.4 K/UL (ref 4–11)

## 2024-12-02 PROCEDURE — 2500000003 HC RX 250 WO HCPCS: Performed by: SURGERY

## 2024-12-02 PROCEDURE — 2580000003 HC RX 258: Performed by: SURGERY

## 2024-12-02 PROCEDURE — 2580000003 HC RX 258

## 2024-12-02 PROCEDURE — 0FT44ZZ RESECTION OF GALLBLADDER, PERCUTANEOUS ENDOSCOPIC APPROACH: ICD-10-PCS | Performed by: SURGERY

## 2024-12-02 PROCEDURE — 6370000000 HC RX 637 (ALT 250 FOR IP): Performed by: NURSE PRACTITIONER

## 2024-12-02 PROCEDURE — 1200000000 HC SEMI PRIVATE

## 2024-12-02 PROCEDURE — 3700000000 HC ANESTHESIA ATTENDED CARE: Performed by: SURGERY

## 2024-12-02 PROCEDURE — 80053 COMPREHEN METABOLIC PANEL: CPT

## 2024-12-02 PROCEDURE — 6360000002 HC RX W HCPCS

## 2024-12-02 PROCEDURE — 6370000000 HC RX 637 (ALT 250 FOR IP): Performed by: INTERNAL MEDICINE

## 2024-12-02 PROCEDURE — 3600000009 HC SURGERY ROBOT BASE: Performed by: SURGERY

## 2024-12-02 PROCEDURE — 2580000003 HC RX 258: Performed by: INTERNAL MEDICINE

## 2024-12-02 PROCEDURE — 99232 SBSQ HOSP IP/OBS MODERATE 35: CPT | Performed by: INTERNAL MEDICINE

## 2024-12-02 PROCEDURE — 6370000000 HC RX 637 (ALT 250 FOR IP): Performed by: SURGERY

## 2024-12-02 PROCEDURE — 47562 LAPAROSCOPIC CHOLECYSTECTOMY: CPT | Performed by: SURGERY

## 2024-12-02 PROCEDURE — A4217 STERILE WATER/SALINE, 500 ML: HCPCS | Performed by: SURGERY

## 2024-12-02 PROCEDURE — S2900 ROBOTIC SURGICAL SYSTEM: HCPCS | Performed by: SURGERY

## 2024-12-02 PROCEDURE — 88304 TISSUE EXAM BY PATHOLOGIST: CPT

## 2024-12-02 PROCEDURE — 7100000000 HC PACU RECOVERY - FIRST 15 MIN: Performed by: SURGERY

## 2024-12-02 PROCEDURE — 3600000019 HC SURGERY ROBOT ADDTL 15MIN: Performed by: SURGERY

## 2024-12-02 PROCEDURE — 8E0W4CZ ROBOTIC ASSISTED PROCEDURE OF TRUNK REGION, PERCUTANEOUS ENDOSCOPIC APPROACH: ICD-10-PCS | Performed by: SURGERY

## 2024-12-02 PROCEDURE — 99232 SBSQ HOSP IP/OBS MODERATE 35: CPT | Performed by: SURGERY

## 2024-12-02 PROCEDURE — 85027 COMPLETE CBC AUTOMATED: CPT

## 2024-12-02 PROCEDURE — 3700000001 HC ADD 15 MINUTES (ANESTHESIA): Performed by: SURGERY

## 2024-12-02 PROCEDURE — 6360000002 HC RX W HCPCS: Performed by: SURGERY

## 2024-12-02 PROCEDURE — 2709999900 HC NON-CHARGEABLE SUPPLY: Performed by: SURGERY

## 2024-12-02 PROCEDURE — 2500000003 HC RX 250 WO HCPCS

## 2024-12-02 PROCEDURE — 7100000001 HC PACU RECOVERY - ADDTL 15 MIN: Performed by: SURGERY

## 2024-12-02 PROCEDURE — 6360000002 HC RX W HCPCS: Performed by: ANESTHESIOLOGY

## 2024-12-02 PROCEDURE — 6360000002 HC RX W HCPCS: Performed by: INTERNAL MEDICINE

## 2024-12-02 PROCEDURE — 85610 PROTHROMBIN TIME: CPT

## 2024-12-02 RX ORDER — NALOXONE HYDROCHLORIDE 0.4 MG/ML
INJECTION, SOLUTION INTRAMUSCULAR; INTRAVENOUS; SUBCUTANEOUS PRN
Status: DISCONTINUED | OUTPATIENT
Start: 2024-12-02 | End: 2024-12-02 | Stop reason: HOSPADM

## 2024-12-02 RX ORDER — SUCCINYLCHOLINE CHLORIDE 20 MG/ML
INJECTION INTRAMUSCULAR; INTRAVENOUS
Status: DISCONTINUED | OUTPATIENT
Start: 2024-12-02 | End: 2024-12-02 | Stop reason: SDUPTHER

## 2024-12-02 RX ORDER — FENTANYL CITRATE 50 UG/ML
INJECTION, SOLUTION INTRAMUSCULAR; INTRAVENOUS
Status: DISCONTINUED | OUTPATIENT
Start: 2024-12-02 | End: 2024-12-02 | Stop reason: SDUPTHER

## 2024-12-02 RX ORDER — SODIUM CHLORIDE 9 MG/ML
INJECTION, SOLUTION INTRAVENOUS PRN
Status: DISCONTINUED | OUTPATIENT
Start: 2024-12-02 | End: 2024-12-02 | Stop reason: HOSPADM

## 2024-12-02 RX ORDER — SODIUM CHLORIDE 0.9 % (FLUSH) 0.9 %
5-40 SYRINGE (ML) INJECTION EVERY 12 HOURS SCHEDULED
Status: DISCONTINUED | OUTPATIENT
Start: 2024-12-02 | End: 2024-12-02 | Stop reason: HOSPADM

## 2024-12-02 RX ORDER — DEXAMETHASONE SODIUM PHOSPHATE 4 MG/ML
INJECTION, SOLUTION INTRA-ARTICULAR; INTRALESIONAL; INTRAMUSCULAR; INTRAVENOUS; SOFT TISSUE
Status: DISCONTINUED | OUTPATIENT
Start: 2024-12-02 | End: 2024-12-02 | Stop reason: SDUPTHER

## 2024-12-02 RX ORDER — OXYCODONE HYDROCHLORIDE 5 MG/1
10 TABLET ORAL PRN
Status: DISCONTINUED | OUTPATIENT
Start: 2024-12-02 | End: 2024-12-02 | Stop reason: HOSPADM

## 2024-12-02 RX ORDER — MEPERIDINE HYDROCHLORIDE 50 MG/ML
12.5 INJECTION INTRAMUSCULAR; INTRAVENOUS; SUBCUTANEOUS EVERY 5 MIN PRN
Status: DISCONTINUED | OUTPATIENT
Start: 2024-12-02 | End: 2024-12-02 | Stop reason: HOSPADM

## 2024-12-02 RX ORDER — OXYCODONE HYDROCHLORIDE 5 MG/1
5 TABLET ORAL PRN
Status: DISCONTINUED | OUTPATIENT
Start: 2024-12-02 | End: 2024-12-02 | Stop reason: HOSPADM

## 2024-12-02 RX ORDER — ROCURONIUM BROMIDE 10 MG/ML
INJECTION, SOLUTION INTRAVENOUS
Status: DISCONTINUED | OUTPATIENT
Start: 2024-12-02 | End: 2024-12-02 | Stop reason: SDUPTHER

## 2024-12-02 RX ORDER — PROPOFOL 10 MG/ML
INJECTION, EMULSION INTRAVENOUS
Status: DISCONTINUED | OUTPATIENT
Start: 2024-12-02 | End: 2024-12-02 | Stop reason: SDUPTHER

## 2024-12-02 RX ORDER — DIPHENHYDRAMINE HYDROCHLORIDE 50 MG/ML
6.25 INJECTION INTRAMUSCULAR; INTRAVENOUS
Status: DISCONTINUED | OUTPATIENT
Start: 2024-12-02 | End: 2024-12-02 | Stop reason: HOSPADM

## 2024-12-02 RX ORDER — ONDANSETRON 2 MG/ML
4 INJECTION INTRAMUSCULAR; INTRAVENOUS ONCE
Status: DISCONTINUED | OUTPATIENT
Start: 2024-12-02 | End: 2024-12-02 | Stop reason: HOSPADM

## 2024-12-02 RX ORDER — MAGNESIUM HYDROXIDE 1200 MG/15ML
LIQUID ORAL CONTINUOUS PRN
Status: COMPLETED | OUTPATIENT
Start: 2024-12-02 | End: 2024-12-02

## 2024-12-02 RX ORDER — INDOCYANINE GREEN AND WATER 25 MG
5 KIT INJECTION
Status: COMPLETED | OUTPATIENT
Start: 2024-12-02 | End: 2024-12-02

## 2024-12-02 RX ORDER — ONDANSETRON 2 MG/ML
INJECTION INTRAMUSCULAR; INTRAVENOUS
Status: DISCONTINUED | OUTPATIENT
Start: 2024-12-02 | End: 2024-12-02 | Stop reason: SDUPTHER

## 2024-12-02 RX ORDER — SODIUM CHLORIDE 0.9 % (FLUSH) 0.9 %
5-40 SYRINGE (ML) INJECTION PRN
Status: DISCONTINUED | OUTPATIENT
Start: 2024-12-02 | End: 2024-12-02 | Stop reason: HOSPADM

## 2024-12-02 RX ORDER — HYDROMORPHONE HYDROCHLORIDE 2 MG/ML
INJECTION, SOLUTION INTRAMUSCULAR; INTRAVENOUS; SUBCUTANEOUS
Status: DISCONTINUED | OUTPATIENT
Start: 2024-12-02 | End: 2024-12-02 | Stop reason: SDUPTHER

## 2024-12-02 RX ORDER — OXYCODONE HYDROCHLORIDE 5 MG/1
5 TABLET ORAL EVERY 4 HOURS PRN
Status: DISCONTINUED | OUTPATIENT
Start: 2024-12-02 | End: 2024-12-06 | Stop reason: HOSPADM

## 2024-12-02 RX ORDER — SODIUM CHLORIDE 9 MG/ML
INJECTION, SOLUTION INTRAVENOUS
Status: DISCONTINUED | OUTPATIENT
Start: 2024-12-02 | End: 2024-12-02 | Stop reason: SDUPTHER

## 2024-12-02 RX ORDER — BUPIVACAINE HYDROCHLORIDE AND EPINEPHRINE 5; 5 MG/ML; UG/ML
INJECTION, SOLUTION PERINEURAL PRN
Status: DISCONTINUED | OUTPATIENT
Start: 2024-12-02 | End: 2024-12-02 | Stop reason: ALTCHOICE

## 2024-12-02 RX ORDER — OXYCODONE HYDROCHLORIDE 5 MG/1
10 TABLET ORAL EVERY 4 HOURS PRN
Status: DISCONTINUED | OUTPATIENT
Start: 2024-12-02 | End: 2024-12-06 | Stop reason: HOSPADM

## 2024-12-02 RX ORDER — MIDAZOLAM HYDROCHLORIDE 1 MG/ML
2 INJECTION, SOLUTION INTRAMUSCULAR; INTRAVENOUS
Status: DISCONTINUED | OUTPATIENT
Start: 2024-12-02 | End: 2024-12-02 | Stop reason: HOSPADM

## 2024-12-02 RX ADMIN — OXYCODONE 5 MG: 5 TABLET ORAL at 18:24

## 2024-12-02 RX ADMIN — DEXAMETHASONE SODIUM PHOSPHATE 6 MG: 4 INJECTION, SOLUTION INTRAMUSCULAR; INTRAVENOUS at 15:30

## 2024-12-02 RX ADMIN — ONDANSETRON 4 MG: 2 INJECTION INTRAMUSCULAR; INTRAVENOUS at 15:30

## 2024-12-02 RX ADMIN — ROCURONIUM BROMIDE 20 MG: 50 INJECTION, SOLUTION INTRAVENOUS at 16:17

## 2024-12-02 RX ADMIN — ATORVASTATIN CALCIUM 40 MG: 40 TABLET, FILM COATED ORAL at 21:23

## 2024-12-02 RX ADMIN — ROCURONIUM BROMIDE 50 MG: 50 INJECTION, SOLUTION INTRAVENOUS at 15:30

## 2024-12-02 RX ADMIN — LIDOCAINE HYDROCHLORIDE 60 MG: 10 INJECTION, SOLUTION INFILTRATION; PERINEURAL at 15:26

## 2024-12-02 RX ADMIN — HYDROMORPHONE HYDROCHLORIDE 0.5 MG: 2 INJECTION, SOLUTION INTRAMUSCULAR; INTRAVENOUS; SUBCUTANEOUS at 17:02

## 2024-12-02 RX ADMIN — PROPOFOL 180 MG: 10 INJECTION, EMULSION INTRAVENOUS at 15:26

## 2024-12-02 RX ADMIN — HYDROMORPHONE HYDROCHLORIDE 0.5 MG: 1 INJECTION, SOLUTION INTRAMUSCULAR; INTRAVENOUS; SUBCUTANEOUS at 17:46

## 2024-12-02 RX ADMIN — SUGAMMADEX 250 MG: 100 INJECTION, SOLUTION INTRAVENOUS at 16:55

## 2024-12-02 RX ADMIN — MEROPENEM 1000 MG: 1 INJECTION INTRAVENOUS at 16:01

## 2024-12-02 RX ADMIN — FENTANYL CITRATE 50 MCG: 50 INJECTION, SOLUTION INTRAMUSCULAR; INTRAVENOUS at 15:26

## 2024-12-02 RX ADMIN — CETIRIZINE HYDROCHLORIDE 10 MG: 10 TABLET, FILM COATED ORAL at 08:27

## 2024-12-02 RX ADMIN — ASPIRIN 81 MG: 81 TABLET, CHEWABLE ORAL at 08:27

## 2024-12-02 RX ADMIN — GABAPENTIN 100 MG: 100 CAPSULE ORAL at 21:23

## 2024-12-02 RX ADMIN — FLUTICASONE PROPIONATE 1 SPRAY: 50 SPRAY, METERED NASAL at 10:03

## 2024-12-02 RX ADMIN — ENOXAPARIN SODIUM 30 MG: 100 INJECTION SUBCUTANEOUS at 21:23

## 2024-12-02 RX ADMIN — SUCCINYLCHOLINE CHLORIDE 140 MG: 20 INJECTION, SOLUTION INTRAMUSCULAR; INTRAVENOUS at 15:26

## 2024-12-02 RX ADMIN — SODIUM CHLORIDE, PRESERVATIVE FREE 10 ML: 5 INJECTION INTRAVENOUS at 08:27

## 2024-12-02 RX ADMIN — INDOCYANINE GREEN AND WATER 5 MG: KIT at 14:37

## 2024-12-02 RX ADMIN — GABAPENTIN 100 MG: 100 CAPSULE ORAL at 08:27

## 2024-12-02 RX ADMIN — Medication: at 10:02

## 2024-12-02 RX ADMIN — MEROPENEM 1000 MG: 1 INJECTION INTRAVENOUS at 08:25

## 2024-12-02 RX ADMIN — PANTOPRAZOLE SODIUM 40 MG: 40 TABLET, DELAYED RELEASE ORAL at 05:28

## 2024-12-02 RX ADMIN — SODIUM CHLORIDE, PRESERVATIVE FREE 10 ML: 5 INJECTION INTRAVENOUS at 21:31

## 2024-12-02 RX ADMIN — SODIUM CHLORIDE: 9 INJECTION, SOLUTION INTRAVENOUS at 15:26

## 2024-12-02 RX ADMIN — HYDROMORPHONE HYDROCHLORIDE 0.5 MG: 2 INJECTION, SOLUTION INTRAMUSCULAR; INTRAVENOUS; SUBCUTANEOUS at 15:49

## 2024-12-02 RX ADMIN — METHOCARBAMOL 500 MG: 100 INJECTION INTRAMUSCULAR; INTRAVENOUS at 15:55

## 2024-12-02 ASSESSMENT — PAIN - FUNCTIONAL ASSESSMENT: PAIN_FUNCTIONAL_ASSESSMENT: 0-10

## 2024-12-02 ASSESSMENT — PAIN SCALES - GENERAL
PAINLEVEL_OUTOF10: 7
PAINLEVEL_OUTOF10: 6

## 2024-12-02 ASSESSMENT — ENCOUNTER SYMPTOMS: SHORTNESS OF BREATH: 1

## 2024-12-02 NOTE — PROGRESS NOTES
[facility]    PATIENT NAME: Annie Singleton     TODAY'S DATE: 12/2/2024    CHIEF COMPLAINT: none    INTERVAL HISTORY/HPI:    Pt with nausea and pain after dinner last night     REVIEW OF SYSTEMS:  Pertinent positives and negatives as per interval history section    OBJECTIVE:  VITALS:  /71   Pulse 84   Temp 98.2 °F (36.8 °C) (Oral)   Resp 16   Ht 1.651 m (5' 5\")   Wt 119.5 kg (263 lb 7.2 oz)   SpO2 95%   BMI 43.84 kg/m²     INTAKE/OUTPUT:    I/O last 3 completed shifts:  In: 1230 [P.O.:1230]  Out: 2400 [Urine:2400]  I/O this shift:  In: -   Out: 400 [Urine:400]    CONSTITUTIONAL:  awake and alert  LUNGS:  Respirations easy and unlabored, no crackles or wheezing  CARD:  regular rate and rhythm  ABDOMEN:  normal bowel sounds, soft, non-distended, non-tender     Data:  CBC:   Recent Labs     11/30/24  0424 12/02/24  0354   WBC 5.4 7.4   HGB 9.3* 11.5*   HCT 27.1* 34.4*    233     BMP:    Recent Labs     11/30/24  0424 12/01/24  0409 12/02/24  0354    134* 139   K 3.8 3.4* 3.8   CL 99 98* 101   CO2 28 27 29   BUN 21* 15 13   CREATININE 0.9 1.0 0.8   GLUCOSE 105* 130* 122*     Hepatic:   Recent Labs     11/29/24  1245 12/02/24  0354   AST 19 79*   ALT 8* 43*   BILITOT 1.0 0.7   ALKPHOS 80 83     Mag:      Recent Labs     12/01/24  0409   MG 2.06      Phos:   No results for input(s): \"PHOS\" in the last 72 hours.   INR:   Recent Labs     12/02/24  0354   INR 1.09       Radiology Review:  *Imaging personally reviewed by me.   NA      ASSESSMENT AND PLAN:  83 yo with acute cholecystitis  Given wax/wain of symptoms plan OR today  Risks of operation discussed with patient and family  Continue abx     Electronically signed by Zachary Escobar MD     [id number]

## 2024-12-02 NOTE — PROGRESS NOTES
[facility] Medicine Progress Note  V 10.25      Date of Admission: 11/26/2024    Hospital Day: 7      Chief Admission Complaint:  Chest Pain (Pt from [facility]..pt is dnr-cc..Pt to er with cp , but pointing more towards belly. Pt is in afib with hx of same.. per squad pt was taken off thinner for a procedure.) and Abdominal Pain    Subjective:      Resting in bed, NAD, updated family at bedside. Plan for OR this afternoon    Presenting Admission History:       84 y.o. female who presented to [facility] with above complaint.  PMHx significant for legally blind, depression, anxiety atrial fibrillation GERD hypertension bronchial asthma.  She lives in nursing home  Developed chest pain and epigastric abdominal pain this morning  Chest pain was in mid chest, 7 in intensity not associated nausea vomiting or shortness of breath.  She has epigastric and left upper quadrant pain which started the same time with a chest pain  Currently she does not have chest pain or abdominal pain  On 6 L oxygen, she told she had cough yesterday not sure about the sputum, no fever  No change in mental status or any focal neurological symptoms  She denies dizziness or syncope or palpitation   she normally walks with a walker.    She has poor short-term memory   History is from patient ER physician and daughters    Assessment/Plan:      Current Principal Problem:  Chest pain    Chest pain - Initially concerning for ACS but possibly secondary to multiple episodes of VTACH in ED, resolved  Initial Troponin 28 ---> 32.    EKG AFIB rate 75.    - Followed serial Troponins, reviewed and documented, and monitor on tele - currently w/out evidence of ischemia and/or arrhythmia.    - Continue aspirin 81 mg daily  - Continue Lipitor 40 mg daily      Troponin elevation - c/w myocardial injury 2nd to      [] STEMI  [] NSTEMI  [] Acute/Chronic Myocardial Injury 2nd to CHF  [] Recent MI (with 4 weeks of admission) dated:   [] Type II MI w/ demand  Case Management  [] Discussed management of the case with           Labs:  Personally reviewed on 12/2/2024 and interpreted for clinical significance as documented above.     Recent Labs     11/30/24  0424 12/02/24  0354   WBC 5.4 7.4   HGB 9.3* 11.5*   HCT 27.1* 34.4*    233     Recent Labs     11/30/24  0424 12/01/24  0409 12/02/24  0354    134* 139   K 3.8 3.4* 3.8   CL 99 98* 101   CO2 28 27 29   BUN 21* 15 13   CREATININE 0.9 1.0 0.8   CALCIUM 9.4 8.8 8.9   MG  --  2.06  --      No results for input(s): \"PROBNP\", \"TROPHS\" in the last 72 hours.    No results for input(s): \"LABA1C\" in the last 72 hours.  Recent Labs     11/29/24  1245 12/02/24  0354   AST 19 79*   ALT 8* 43*   BILIDIR 0.4*  --    BILITOT 1.0 0.7   ALKPHOS 80 83       Recent Labs     12/02/24  0354   INR 1.09       Urine Cultures:   Lab Results   Component Value Date/Time    LABURIN  07/31/2021 05:18 PM     <50,000 CFU/ml mixed skin/urogenital satya. No further workup     Blood Cultures:   Lab Results   Component Value Date/Time    BC  11/29/2024 12:34 PM     No Growth to date.  Any change in status will be called.     Lab Results   Component Value Date/Time    BLOODCULT2  11/29/2024 12:34 PM     No Growth to date.  Any change in status will be called.     Organism:   Lab Results   Component Value Date/Time    ORG Pseudomonas aeruginosa DNA Detected 11/26/2024 08:05 AM    ORG Pseudomonas aeruginosa 11/26/2024 08:05 AM         KIRILL Yung - CNP

## 2024-12-02 NOTE — PLAN OF CARE
Problem: Discharge Planning  Goal: Discharge to home or other facility with appropriate resources  12/2/2024 0732 by Lynn Pierson RN  Outcome: Progressing  12/1/2024 2103 by Vicenta Mccollum RN  Outcome: Progressing  12/1/2024 2032 by Izabela Echeverria RN  Outcome: Progressing     Problem: Pain  Goal: Verbalizes/displays adequate comfort level or baseline comfort level  12/2/2024 0732 by Lynn Pierson RN  Outcome: Progressing  12/1/2024 2103 by Vicenta Mccollum RN  Outcome: Progressing  12/1/2024 2032 by Izabela Echeverria RN  Outcome: Progressing     Problem: Safety - Adult  Goal: Free from fall injury  12/2/2024 0732 by Lynn Pierson RN  Outcome: Progressing  12/1/2024 2103 by Vicenta Mccollum RN  Outcome: Progressing  12/1/2024 2032 by Izabela Echeverria RN  Outcome: Progressing     Problem: Skin/Tissue Integrity  Goal: Absence of new skin breakdown  Description: 1.  Monitor for areas of redness and/or skin breakdown  2.  Assess vascular access sites hourly  3.  Every 4-6 hours minimum:  Change oxygen saturation probe site  4.  Every 4-6 hours:  If on nasal continuous positive airway pressure, respiratory therapy assess nares and determine need for appliance change or resting period.  12/2/2024 0732 by Lynn Pierson RN  Outcome: Progressing  12/1/2024 2103 by Vicenta Mccollum RN  Outcome: Progressing  12/1/2024 2032 by Izabela Echeverria RN  Outcome: Progressing     Problem: Respiratory - Adult  Goal: Achieves optimal ventilation and oxygenation  12/2/2024 0732 by Lynn Pierson RN  Outcome: Progressing  12/1/2024 2103 by Vicenta Mccollum RN  Outcome: Progressing  12/1/2024 2032 by Izabela Echeverria RN  Outcome: Progressing     Problem: Cardiovascular - Adult  Goal: Absence of cardiac dysrhythmias or at baseline  12/2/2024 0732 by Lynn Pierson RN  Outcome: Progressing  12/1/2024 2103 by Scally, Vicenta, RN  Outcome: Progressing  12/1/2024 2032 by Izabela Echeverria, RN  Outcome:  Progressing     Problem: Skin/Tissue Integrity - Adult  Goal: Skin integrity remains intact  12/2/2024 0732 by Lynn Pierson RN  Outcome: Progressing  12/1/2024 2103 by Vicenta Mccollum RN  Outcome: Progressing     Problem: Genitourinary - Adult  Goal: Absence of urinary retention  12/2/2024 0732 by Lynn Pierson RN  Outcome: Progressing  12/1/2024 2103 by Vicenta Mccollum RN  Outcome: Progressing     Problem: Metabolic/Fluid and Electrolytes - Adult  Goal: Electrolytes maintained within normal limits  12/2/2024 0732 by Lynn Pierson RN  Outcome: Progressing  12/1/2024 2103 by Vicenta Mccollum RN  Outcome: Progressing     Problem: Nutrition Deficit:  Goal: Optimize nutritional status  12/2/2024 0732 by Lynn Pierson RN  Outcome: Progressing  12/1/2024 2103 by Vicenta Mccollum RN  Outcome: Progressing

## 2024-12-02 NOTE — PLAN OF CARE
Problem: Discharge Planning  Goal: Discharge to home or other facility with appropriate resources  Outcome: Progressing     Problem: Pain  Goal: Verbalizes/displays adequate comfort level or baseline comfort level  Outcome: Progressing     Problem: Safety - Adult  Goal: Free from fall injury  Outcome: Progressing     Problem: Skin/Tissue Integrity  Goal: Absence of new skin breakdown  Description: 1.  Monitor for areas of redness and/or skin breakdown  2.  Assess vascular access sites hourly  3.  Every 4-6 hours minimum:  Change oxygen saturation probe site  4.  Every 4-6 hours:  If on nasal continuous positive airway pressure, respiratory therapy assess nares and determine need for appliance change or resting period.  Outcome: Progressing     Problem: Respiratory - Adult  Goal: Achieves optimal ventilation and oxygenation  Outcome: Progressing     Problem: Cardiovascular - Adult  Goal: Absence of cardiac dysrhythmias or at baseline  Outcome: Progressing

## 2024-12-02 NOTE — CARE COORDINATION
Patient continues to wax and wane with ongoing symptoms per General Surgery notes. NPO after midnight last night for possible OR this AM for acute cholecystitis. Will follow for d/c planning. LTC at [facility] and can return when stable.

## 2024-12-02 NOTE — ANESTHESIA PRE PROCEDURE
Plan      general     ASA 4     (Medications & allergies reviewed  All available lab & EKG data reviewed)  Induction: intravenous.      Anesthetic plan and risks discussed with patient.      Plan discussed with CRNA.                TRINIDAD KELLER MD   12/2/2024

## 2024-12-02 NOTE — PLAN OF CARE
Problem: Discharge Planning  Goal: Discharge to home or other facility with appropriate resources  12/1/2024 2103 by Vicenta Mccollum RN  Outcome: Progressing  12/1/2024 2032 by Izabela Echeverria RN  Outcome: Progressing     Problem: Pain  Goal: Verbalizes/displays adequate comfort level or baseline comfort level  12/1/2024 2103 by Vicenta Mccollum RN  Outcome: Progressing  12/1/2024 2032 by Izabela Echeverria RN  Outcome: Progressing     Problem: Safety - Adult  Goal: Free from fall injury  12/1/2024 2103 by Vicenta Mccollum RN  Outcome: Progressing  12/1/2024 2032 by Izabela Echeverria RN  Outcome: Progressing     Problem: Skin/Tissue Integrity  Goal: Absence of new skin breakdown  Description: 1.  Monitor for areas of redness and/or skin breakdown  2.  Assess vascular access sites hourly  3.  Every 4-6 hours minimum:  Change oxygen saturation probe site  4.  Every 4-6 hours:  If on nasal continuous positive airway pressure, respiratory therapy assess nares and determine need for appliance change or resting period.  12/1/2024 2103 by Vicenta Mccollum RN  Outcome: Progressing  12/1/2024 2032 by Izabela Echeverria RN  Outcome: Progressing     Problem: Respiratory - Adult  Goal: Achieves optimal ventilation and oxygenation  12/1/2024 2103 by Vicenta Mccollum RN  Outcome: Progressing  12/1/2024 2032 by Izabela Echeverria RN  Outcome: Progressing     Problem: Cardiovascular - Adult  Goal: Absence of cardiac dysrhythmias or at baseline  12/1/2024 2103 by Vicenta Mccollum RN  Outcome: Progressing  12/1/2024 2032 by Izabela Echeverria RN  Outcome: Progressing     Problem: Skin/Tissue Integrity - Adult  Goal: Skin integrity remains intact  Outcome: Progressing     Problem: Genitourinary - Adult  Goal: Absence of urinary retention  Outcome: Progressing     Problem: Metabolic/Fluid and Electrolytes - Adult  Goal: Electrolytes maintained within normal limits  Outcome: Progressing     Problem: Nutrition Deficit:  Goal: Optimize nutritional  status  Outcome: Progressing

## 2024-12-02 NOTE — PROGRESS NOTES
Infectious Disease Follow up Notes    CC :  acute cholecystitis and Psa bacteremia      Antibiotics:  Meropenem 1g q8, s 11/29    Cefepime 11/26-11/29      Admit Date:   11/26/2024  Hospital Day: 7    Subjective:   She has had intermittent low grade fever in the last 24 hours   On RA   No BM documented since admission   She denies pain   Multiple family at bedside     Objective:     Patient Vitals for the past 8 hrs:   BP Temp Temp src Pulse Resp SpO2 Weight   12/02/24 0818 127/71 98.2 °F (36.8 °C) Oral 84 16 95 % --   12/02/24 0609 -- -- -- -- -- -- 119.5 kg (263 lb 7.2 oz)   12/02/24 0330 (!) 155/80 99.4 °F (37.4 °C) Oral 74 18 95 % --       EXAM:  General:   alert, conversant, NAD     HEENT:   NCAT  MMM, no thrush   NECK:  supple     LUNGS:   non-labored breathing    Upper lobes clear amarilis   CV:   RRR  ABD: Soft, tender RUQ, no R/G  BS present     EXT: No focal rash         LINE:    PIV in place        Scheduled Meds:   cetirizine  10 mg Oral Daily    fluticasone  1 spray Each Nostril Daily    meropenem  1,000 mg IntraVENous Q8H    pantoprazole  40 mg Oral BID AC    gabapentin  100 mg Oral TID    wound/burn dressing   Topical Daily    [Held by provider] bumetanide  2 mg Oral Daily    sodium chloride flush  5-40 mL IntraVENous 2 times per day    aspirin  81 mg Oral Daily    atorvastatin  40 mg Oral Nightly    enoxaparin  30 mg SubCUTAneous BID       Continuous Infusions:   sodium chloride            Data Review:    Lab Results   Component Value Date    WBC 7.4 12/02/2024    HGB 11.5 (L) 12/02/2024    HCT 34.4 (L) 12/02/2024    MCV 95.3 12/02/2024     12/02/2024     Lab Results   Component Value Date    CREATININE 0.8 12/02/2024    BUN 13 12/02/2024     12/02/2024    K 3.8 12/02/2024     12/02/2024    CO2 29 12/02/2024       Hepatic Function Panel:   Lab Results   Component Value Date/Time    ALKPHOS 83 12/02/2024

## 2024-12-02 NOTE — PROGRESS NOTES
Patient arrived to PACU bay 7, phase one initiated. Placed on bedside monitor, VSS. Report obtained from OR RN and anesthesia. Patient on room air. Assessment WNL. Warm blankets applied. Side rails in place, will monitor patient closely.

## 2024-12-03 LAB
ANION GAP SERPL CALCULATED.3IONS-SCNC: 8 MMOL/L (ref 3–16)
BACTERIA BLD CULT ORG #2: NORMAL
BACTERIA BLD CULT: NORMAL
BUN SERPL-MCNC: 12 MG/DL (ref 7–20)
CALCIUM SERPL-MCNC: 8.5 MG/DL (ref 8.3–10.6)
CHLORIDE SERPL-SCNC: 104 MMOL/L (ref 99–110)
CO2 SERPL-SCNC: 27 MMOL/L (ref 21–32)
CREAT SERPL-MCNC: 0.8 MG/DL (ref 0.6–1.2)
DEPRECATED RDW RBC AUTO: 13.6 % (ref 12.4–15.4)
EKG ATRIAL RATE: 394 BPM
EKG DIAGNOSIS: NORMAL
EKG Q-T INTERVAL: 420 MS
EKG QRS DURATION: 88 MS
EKG QTC CALCULATION (BAZETT): 466 MS
EKG R AXIS: -20 DEGREES
EKG T AXIS: -37 DEGREES
EKG VENTRICULAR RATE: 74 BPM
GFR SERPLBLD CREATININE-BSD FMLA CKD-EPI: 73 ML/MIN/{1.73_M2}
GLUCOSE SERPL-MCNC: 125 MG/DL (ref 70–99)
HCT VFR BLD AUTO: 34.2 % (ref 36–48)
HGB BLD-MCNC: 11.4 G/DL (ref 12–16)
MCH RBC QN AUTO: 31.8 PG (ref 26–34)
MCHC RBC AUTO-ENTMCNC: 33.5 G/DL (ref 31–36)
MCV RBC AUTO: 95 FL (ref 80–100)
PLATELET # BLD AUTO: 251 K/UL (ref 135–450)
PMV BLD AUTO: 8 FL (ref 5–10.5)
POTASSIUM SERPL-SCNC: 4.2 MMOL/L (ref 3.5–5.1)
RBC # BLD AUTO: 3.6 M/UL (ref 4–5.2)
SODIUM SERPL-SCNC: 139 MMOL/L (ref 136–145)
WBC # BLD AUTO: 13.3 K/UL (ref 4–11)

## 2024-12-03 PROCEDURE — 6370000000 HC RX 637 (ALT 250 FOR IP): Performed by: SURGERY

## 2024-12-03 PROCEDURE — 85027 COMPLETE CBC AUTOMATED: CPT

## 2024-12-03 PROCEDURE — 99024 POSTOP FOLLOW-UP VISIT: CPT | Performed by: SURGERY

## 2024-12-03 PROCEDURE — 97164 PT RE-EVAL EST PLAN CARE: CPT

## 2024-12-03 PROCEDURE — 80048 BASIC METABOLIC PNL TOTAL CA: CPT

## 2024-12-03 PROCEDURE — 94761 N-INVAS EAR/PLS OXIMETRY MLT: CPT

## 2024-12-03 PROCEDURE — 2580000003 HC RX 258: Performed by: SURGERY

## 2024-12-03 PROCEDURE — 97168 OT RE-EVAL EST PLAN CARE: CPT

## 2024-12-03 PROCEDURE — 51798 US URINE CAPACITY MEASURE: CPT

## 2024-12-03 PROCEDURE — 6360000002 HC RX W HCPCS: Performed by: SURGERY

## 2024-12-03 PROCEDURE — 97110 THERAPEUTIC EXERCISES: CPT

## 2024-12-03 PROCEDURE — 2700000000 HC OXYGEN THERAPY PER DAY

## 2024-12-03 PROCEDURE — 93005 ELECTROCARDIOGRAM TRACING: CPT | Performed by: INTERNAL MEDICINE

## 2024-12-03 PROCEDURE — 93010 ELECTROCARDIOGRAM REPORT: CPT | Performed by: INTERNAL MEDICINE

## 2024-12-03 PROCEDURE — 97530 THERAPEUTIC ACTIVITIES: CPT

## 2024-12-03 PROCEDURE — 1200000000 HC SEMI PRIVATE

## 2024-12-03 RX ADMIN — SODIUM CHLORIDE, PRESERVATIVE FREE 10 ML: 5 INJECTION INTRAVENOUS at 21:13

## 2024-12-03 RX ADMIN — ACETAMINOPHEN 650 MG: 325 TABLET ORAL at 08:18

## 2024-12-03 RX ADMIN — ATORVASTATIN CALCIUM 40 MG: 40 TABLET, FILM COATED ORAL at 21:01

## 2024-12-03 RX ADMIN — MEROPENEM 1000 MG: 1 INJECTION INTRAVENOUS at 08:26

## 2024-12-03 RX ADMIN — ACETAMINOPHEN 650 MG: 325 TABLET ORAL at 14:12

## 2024-12-03 RX ADMIN — MEROPENEM 1000 MG: 1 INJECTION INTRAVENOUS at 00:58

## 2024-12-03 RX ADMIN — FLUTICASONE PROPIONATE 1 SPRAY: 50 SPRAY, METERED NASAL at 08:37

## 2024-12-03 RX ADMIN — GABAPENTIN 100 MG: 100 CAPSULE ORAL at 08:18

## 2024-12-03 RX ADMIN — CETIRIZINE HYDROCHLORIDE 10 MG: 10 TABLET, FILM COATED ORAL at 08:18

## 2024-12-03 RX ADMIN — ENOXAPARIN SODIUM 30 MG: 100 INJECTION SUBCUTANEOUS at 08:19

## 2024-12-03 RX ADMIN — MEROPENEM 1000 MG: 1 INJECTION INTRAVENOUS at 16:15

## 2024-12-03 RX ADMIN — Medication: at 08:30

## 2024-12-03 RX ADMIN — PANTOPRAZOLE SODIUM 40 MG: 40 TABLET, DELAYED RELEASE ORAL at 05:00

## 2024-12-03 RX ADMIN — MORPHINE SULFATE 2 MG: 2 INJECTION, SOLUTION INTRAMUSCULAR; INTRAVENOUS at 21:01

## 2024-12-03 RX ADMIN — ASPIRIN 81 MG: 81 TABLET, CHEWABLE ORAL at 08:18

## 2024-12-03 RX ADMIN — PANTOPRAZOLE SODIUM 40 MG: 40 TABLET, DELAYED RELEASE ORAL at 16:12

## 2024-12-03 RX ADMIN — ACETAMINOPHEN 650 MG: 325 TABLET ORAL at 21:01

## 2024-12-03 RX ADMIN — GABAPENTIN 100 MG: 100 CAPSULE ORAL at 21:01

## 2024-12-03 RX ADMIN — ENOXAPARIN SODIUM 30 MG: 100 INJECTION SUBCUTANEOUS at 21:00

## 2024-12-03 RX ADMIN — GABAPENTIN 100 MG: 100 CAPSULE ORAL at 14:12

## 2024-12-03 RX ADMIN — SODIUM CHLORIDE, PRESERVATIVE FREE 10 ML: 5 INJECTION INTRAVENOUS at 08:29

## 2024-12-03 ASSESSMENT — PAIN DESCRIPTION - DIRECTION: RADIATING_TOWARDS: STOMACH

## 2024-12-03 ASSESSMENT — PAIN DESCRIPTION - PAIN TYPE: TYPE: ACUTE PAIN

## 2024-12-03 ASSESSMENT — PAIN DESCRIPTION - DESCRIPTORS
DESCRIPTORS: PRESSURE
DESCRIPTORS: ACHING
DESCRIPTORS: ACHING

## 2024-12-03 ASSESSMENT — PAIN DESCRIPTION - LOCATION
LOCATION: HEAD
LOCATION: CHEST
LOCATION: CHEST

## 2024-12-03 ASSESSMENT — PAIN DESCRIPTION - ORIENTATION
ORIENTATION: MID
ORIENTATION: OTHER (COMMENT)

## 2024-12-03 ASSESSMENT — PAIN SCALES - GENERAL
PAINLEVEL_OUTOF10: 3
PAINLEVEL_OUTOF10: 4
PAINLEVEL_OUTOF10: 8

## 2024-12-03 ASSESSMENT — PAIN - FUNCTIONAL ASSESSMENT: PAIN_FUNCTIONAL_ASSESSMENT: ACTIVITIES ARE NOT PREVENTED

## 2024-12-03 NOTE — PROGRESS NOTES
[facility] GENERAL SURGERY    Surgery Progress Note           POD # 1    PATIENT NAME: Annie Singleton     TODAY'S DATE: 12/3/2024    INTERVAL HISTORY:    Pt  states that she did well last night, pain well controlled, tolerated dinner and breakfast without any abdominal pain, nausea or emesis.     OBJECTIVE:   VITALS:  /70   Pulse 72   Temp 97.7 °F (36.5 °C) (Oral)   Resp 16   Ht 1.651 m (5' 5\")   Wt 119.8 kg (264 lb 1.8 oz)   SpO2 97%   BMI 43.95 kg/m²     INTAKE/OUTPUT:    I/O last 3 completed shifts:  In: 705 [I.V.:600; IV Piggyback:105]  Out: 1900 [Urine:1900]  I/O this shift:  In: 10 [I.V.:10]  Out: 500 [Urine:500]              CONSTITUTIONAL:  awake and alert  LUNGS:  Normal effort on 3L NC  ABDOMEN: Appropriately tender to palpation, soft, non-distended, incisions c/d/I and covered with surgical grade glue, mild ecchymosis stable     Data:  CBC:   Recent Labs     12/02/24  0354 12/03/24  0528   WBC 7.4 13.3*   HGB 11.5* 11.4*   HCT 34.4* 34.2*    251     BMP:    Recent Labs     12/01/24  0409 12/02/24  0354 12/03/24  0528   * 139 139   K 3.4* 3.8 4.2   CL 98* 101 104   CO2 27 29 27   BUN 15 13 12   CREATININE 1.0 0.8 0.8   GLUCOSE 130* 122* 125*     Hepatic:   Recent Labs     12/02/24  0354   AST 79*   ALT 43*   BILITOT 0.7   ALKPHOS 83     Mag:      Recent Labs     12/01/24  0409   MG 2.06      Phos:   No results for input(s): \"PHOS\" in the last 72 hours.   INR:   Recent Labs     12/02/24  0354   INR 1.09       ASSESSMENT AND PLAN:  84 y.o. female status post robotic cholecystectomy    - Okay for general diet  - Leukocytosis, likely reactive from surgery  - Continue PRN pain medication  - Encourage OOB to chair  - Encourage IS 10x per hour while awake  - Dispo 1-2 days back to facility.      Annette Strange DO, MS  PGY4, General Surgery  12/03/24  12:18 PM  [phone]      Electronically signed by Annette Strange DO

## 2024-12-03 NOTE — PROGRESS NOTES
Perfect serve sent requesting midline order and evaluation of the femoral CVC and if needed.     Request for flores removal and voiding trail.

## 2024-12-03 NOTE — PROGRESS NOTES
Physician Progress Note      PATIENT:               MONET COREA  CSN #:                  [id number]  :                       1940  ADMIT DATE:       2024 7:43 AM  DISCH DATE:  RESPONDING  PROVIDER #:        HUMA YAN          QUERY TEXT:    Pt admitted with cholecystitis and pneumonia.  Noted documentation of sepsis   by Cardiology consultant on .  If possible, please document in progress   notes and discharge summary:    The medical record reflects the following:  Risk Factors: cholecystitis, pneumonia, bacteremia with Pseudomonas  Clinical Indicators: WBC 16, lactic acid 2.7-2.9, acute respiratory failure,   COLLINS, vitals: SPO2 61-98%, RR 18-28, encephalopathy  Treatment: ID consult, serial labs, cultures, IV Merrem, planned   cholecystectomy on , IVF bolus, supportive care  Options provided:  -- Sepsis due to cholecystitis and pneumonia confirmed present on admission  -- Sepsis ruled out  -- Other - I will add my own diagnosis  -- Disagree - Not applicable / Not valid  -- Disagree - Clinically unable to determine / Unknown  -- Refer to Clinical Documentation Reviewer    PROVIDER RESPONSE TEXT:    The diagnosis of sepsis was ruled out.    Query created by: Mirna Kerns on 12/3/2024 11:57 AM      Electronically signed by:  HUMA YAN 12/3/2024 12:35 PM

## 2024-12-03 NOTE — PROGRESS NOTES
[facility] Medicine Progress Note  V 10.25      Date of Admission: 11/26/2024    Hospital Day: 8      Chief Admission Complaint:  Chest Pain (Pt from [facility]..pt is dnr-cc..Pt to er with cp , but pointing more towards belly. Pt is in afib with hx of same.. per squad pt was taken off thinner for a procedure.) and Abdominal Pain    Subjective:      Patient sitting in chair, NAD, updated family at bedside. Pain controlled.    Presenting Admission History:       84 y.o. female [facility].  PMHx significant for legally blind, depression, anxiety atrial fibrillation GERD hypertension bronchial asthma.  She lives in nursing home  Developed chest pain and epigastric abdominal pain this morning  Chest pain was in mid chest, 7 in intensity not associated nausea vomiting or shortness of breath.  She has epigastric and left upper quadrant pain which started the same time with a chest pain  Currently she does not have chest pain or abdominal pain  On 6 L oxygen, she told she had cough yesterday not sure about the sputum, no fever  No change in mental status or any focal neurological symptoms  She denies dizziness or syncope or palpitation   she normally walks with a walker.    She has poor short-term memory   History is from patient ER physician and daughters    Assessment/Plan:      Current Principal Problem:  Chest pain    Chest pain - Initially concerning for ACS but possibly secondary to multiple episodes of VTACH in ED, resolved  Initial Troponin 28 ---> 32.    EKG AFIB rate 75.    - Followed serial Troponins, reviewed and documented, and monitor on tele - currently w/out evidence of ischemia and/or arrhythmia.    - Continue aspirin 81 mg daily  - Continue Lipitor 40 mg daily      Troponin elevation - c/w myocardial injury 2nd to      [] STEMI  [] NSTEMI  [] Acute/Chronic Myocardial Injury 2nd to CHF  [] Recent MI (with 4 weeks of admission) dated:   [] Type II MI w/ demand

## 2024-12-03 NOTE — PROGRESS NOTES
Occupational Therapy  Facility/Department: [facility] C3 TELE/MED SURG/ONC  Re-Evaluation & Treatment    Name: Annie Singleton  : 1940  MRN: [id number]  Date of Service: 12/3/2024    Discharge Recommendations:  Subacute/Skilled Nursing Facility  Therapy discharge recommendations take into account each patient's current medical complexities and are subject to input/oversight from the patient's healthcare team.   Barriers to Home Discharge:      [x] Unable to transfer, ambulate, or propel wheelchair household distances without assist   [x] Limited available assist at home upon discharge    [x] Patient or family requests d/c to post-acute facility       If pt is unable to be seen after this session, please let this note serve as discharge summary.  Please see case management note for discharge disposition.  Thank you.           Patient Diagnosis(es): The primary encounter diagnosis was Nonsustained ventricular tachycardia (HCC). Diagnoses of Elevated troponin, QT prolongation, Leukocytosis, unspecified type, Pneumonia due to infectious organism, unspecified laterality, unspecified part of lung, and Acute cholecystitis were also pertinent to this visit.  Past Medical History:  has a past medical history of Arrhythmia, Arthritis, Asthma, Atrial fibrillation (HCC), Diverticula of colon, GERD (gastroesophageal reflux disease), Hypertension, and IBS (irritable bowel syndrome).  Past Surgical History:  has a past surgical history that includes knee surgery; Colonoscopy; Hysterectomy; and joint replacement.           Assessment  Performance deficits / Impairments: Decreased ADL status;Decreased balance;Decreased coordination;Decreased functional mobility ;Decreased high-level IADLs;Decreased strength;Decreased endurance  Assessment: Pt presents to [facility] with chest pain, leg swelling. Pt PTA getting therapy at LTC  facility, working towards walking to/from bathroom; pt s/p mariola austin 24, now requiring mod assist of 2

## 2024-12-03 NOTE — DISCHARGE INSTRUCTIONS
[facility]    Stew Vee M.D.   [facility] Office      [facility]        [facility]               [address]                [address]  Alejandro Escobar M.D.              [address]          [place] [address]         [place] [address]  Manuel Barrientos M.D                         [phone]        [facility]                   Fahad Mazariegos M.D.          [facility]       POST-OPERATIVE INSTRUCTIONS FOR GALLBLADDER SURGERY    Call the office to schedule your post-operative appointment with your surgeon for two (2) weeks.     You will have either white steri-strips and a water occlusive dressing or surgical glue closing your incisions.    If you have clear bandages over your incisions, you may remove them in 3-4 days.  Leave the steri-stips in place. These will peel away in 7-10 days.     You may shower.  Wash incisions gently, and pat them dry. Do not rub your incisions.    General guidelines for activity:   Avoid strenuous activity or lifting anything heavier than 20 pounds.    It is OK to be up  walking around, and walking up and down stairs.     Do what is comfortable: stop and rest when you feel tired.   Drink plenty of fluids and stay on a bland diet for 2-3 days after surgery.     Do NOT drive while taking your narcotic pain medicine.     Watch for signs of infection:  Excessive warmth or bright redness around your incisions  Leakage cloudy fluid from you incisions  Fever over 101.5  During the laparoscopic procedure that you had, gas is pumped into the abdominal cavity.  You may feel abdominal, shoulder, or rib pain for a few days due to this gas.    You will have pain medicine ordered. Take as directed    If you experience constipation:  Increase your water intake  Increase your activity, walking is best.  A stool softener or mild laxative

## 2024-12-03 NOTE — PROGRESS NOTES
4 Eyes Skin Assessment and Patient belongings     The patient is being assess for  Shift Handoff    I agree that 2 Nurses have performed a thorough Head to Toe Skin Assessment on the patient. ALL assessment sites listed below have been assessed.       Areas assessed by both nurses: Claudia/Reyna  [x]   Head, Face, and Ears   [x]   Shoulders, Back, and Chest  [x]   Arms, Elbows, and Hands   [x]   Coccyx, Sacrum, and IschIum  [x]   Legs, Feet, and Heels        Does the Patient have Skin Breakdown?  No         Ben Prevention initiated:  Yes   Wound Care Orders initiated:  NA      [facility] nurse consulted for Pressure Injury (Stage 3,4, Unstageable, DTI, NWPT, and Complex wounds), New and Established Ostomies:  NA      I agree that 2 Nurses have reviewed patient belongings with the patient/family and documented in the flowsheet upon admission or transfer to the unit.     Belongings  Dental Appliances: Sent home  Vision - Corrective Lenses: None  Hearing Aid: None  Clothing: Sent home  Jewelry: Ring  Body Piercings Removed: No  Electronic Devices: None  Weapons (Notify Protective Services/Security): None  Other Valuables: At home  Home Medications: None  Valuables Given To: Family (Comment)  Provide Name(s) of Who Valuable(s) Were Given To: children  Responsible person(s) in the waiting room: na  Patient approves for provider to speak to responsible person post operatively: Yes       Nurse 1 eSignature: Electronically signed by CLAUDIA GARDINER RN on 12/3/24 at 11:51 AM EST    **SHARE this note so that the co-signing nurse is able to place an eSignature**    Nurse 2 eSignature: Electronically signed by Reyna Colindres RN on 12/3/24 at 6:59 PM EST

## 2024-12-03 NOTE — CARE COORDINATION
Cm spoke with pt's daughter,Renae on telephone, She is aware pt may dc tomorrow back to [facility]. They would like stretcher transport to facility. She asked if pt could be seen by Cardiology while in hospital. Cm will notify attending. S/p norman 12/2. Cm will continue to follow.

## 2024-12-03 NOTE — ANESTHESIA POSTPROCEDURE EVALUATION
Department of Anesthesiology  Postprocedure Note    Patient: Annie Singleton  MRN: [id number]  YOB: 1940  Date of evaluation: 12/2/2024    Procedure Summary       Date: 12/02/24 Room / Location: [address]    Anesthesia Start: 1519 Anesthesia Stop: 1719    Procedure: CHOLECYSTECTOMY LAPAROSCOPIC CHOLANGIOGRAM ROBOTIC XI (Abdomen) Diagnosis:       Acute cholecystitis      (Acute cholecystitis [K81.0])    Surgeons: Zachary Escobar MD Responsible Provider: Aaron Burt MD    Anesthesia Type: general ASA Status: 4            Anesthesia Type: No value filed.    Maria Guadalupe Phase I: Maria Guadalupe Score: 9    Maria Guadalupe Phase II:      Anesthesia Post Evaluation    Comments: Anes Post-op Note    Name:    Annie Singleton  MRN:      [id number]    Patient Vitals in the past 12 hrs:  12/02/24 1940, BP:122/71, Temp:98.1 °F (36.7 °C), Temp src:Oral, Pulse:98, Resp:18, SpO2:96 %  12/02/24 1753, BP:(!) 143/67, Pulse:91, Resp:(!) 0, SpO2:96 %  12/02/24 1748, BP:(!) 148/100, Pulse:(!) 101, Resp:12, SpO2:92 %  12/02/24 1743, BP:(!) 147/83, Pulse:(!) 102, Resp:13, SpO2:93 %  12/02/24 1738, BP:(!) 150/89, Pulse:(!) 103, Resp:(!) 35, SpO2:93 %  12/02/24 1734, BP:(!) 141/75, Pulse:98, Resp:(!) 2, SpO2:93 %  12/02/24 1729, BP:(!) 146/72, Resp:18, SpO2:90 %  12/02/24 1727, Temp:97.2 °F (36.2 °C), Temp src:Oral  12/02/24 1724, BP:(!) 142/69, Resp:13, SpO2:95 %  12/02/24 1717, BP:(!) 147/76  12/02/24 1714, Temp:97.8 °F (36.6 °C), Temp src:Oral, Pulse:92, Resp:20, SpO2:94 %  12/02/24 1428, BP:(!) 133/54, Temp:98.4 °F (36.9 °C), Temp src:Oral, Pulse:82, Resp:16, SpO2:94 %  12/02/24 1226, BP:131/75, Temp:98.5 °F (36.9 °C), Temp src:Oral, Pulse:84, Resp:16, SpO2:93 %  12/02/24 0818, BP:127/71, Temp:98.2 °F (36.8 °C), Temp src:Oral, Pulse:84, Resp:16, SpO2:95 %     LABS:    CBC  Lab Results       Component                Value               Date/Time                  WBC                      7.4

## 2024-12-03 NOTE — PLAN OF CARE
Problem: Discharge Planning  Goal: Discharge to home or other facility with appropriate resources  Outcome: Progressing  Flowsheets (Taken 12/3/2024 1456)  Discharge to home or other facility with appropriate resources: Identify barriers to discharge with patient and caregiver     Problem: Pain  Goal: Verbalizes/displays adequate comfort level or baseline comfort level  Outcome: Progressing  Flowsheets (Taken 12/3/2024 1456)  Verbalizes/displays adequate comfort level or baseline comfort level:   Encourage patient to monitor pain and request assistance   Administer analgesics based on type and severity of pain and evaluate response   Implement non-pharmacological measures as appropriate and evaluate response   Assess pain using appropriate pain scale     Problem: Safety - Adult  Goal: Free from fall injury  Outcome: Progressing  Flowsheets (Taken 12/3/2024 1456)  Free From Fall Injury: Instruct family/caregiver on patient safety

## 2024-12-03 NOTE — PROGRESS NOTES
Limitations Requiring Skilled Therapeutic Intervention: Decreased functional mobility ;Decreased body mechanics;Decreased balance;Decreased endurance;Decreased strength;Decreased safe awareness;Decreased posture;Decreased vision/visual deficit  Assessment: Pt re-evaluated this date following mariola austin on 12/2/24. Pt required increased assist for bed mobility and transfers this date requiring up to mod A x2. Pt would benefit from continued skilled PT. Recommend SNF for continued therapy.  Treatment Diagnosis: impaired functional mobility  Therapy Prognosis: Good  Decision Making: Medium Complexity  Requires PT Follow-Up: Yes  Activity Tolerance  Activity Tolerance: Patient tolerated treatment well;Patient limited by fatigue;Patient limited by pain    Plan  Physical Therapy Plan  General Plan: 3-5 times per week  Current Treatment Recommendations: Strengthening, Balance training, Functional mobility training, Transfer training, Gait training, Stair training, Neuromuscular re-education, Home exercise program, Safety education & training, Therapeutic activities, Wheelchair mobility training  Safety Devices  Type of Devices: Gait belt, Nurse notified, Left in chair, Call light within reach, Chair alarm in place, Patient at risk for falls  Restraints  Restraints Initially in Place: No    Restrictions  Restrictions/Precautions  Restrictions/Precautions: Fall Risk, General Precautions, Up as Tolerated  Position Activity Restriction  Other position/activity restrictions: fem central line, IV, flores, Blind; telemetry     Subjective  General  Chart Reviewed: Yes  Patient assessed for rehabilitation services?: Yes  Family / Caregiver Present: No  Referring Practitioner: Manuel Rangel APRN - CNP  Referral Date : 12/03/24  Diagnosis: chest pain  Follows Commands: Within Functional Limits  Subjective  Subjective: Pt agreeable to therapy.         Social/Functional History  Social/Functional History  Lives With: Other (comment)  Type  standing up from a chair using your arms?: A Lot  How much help is needed walking in hospital room?: A Lot  How much help is needed climbing 3-5 steps with a railing?: A Lot  AM-PAC Inpatient Mobility Raw Score : 12  AM-PAC Inpatient T-Scale Score : 35.33  Mobility Inpatient CMS 0-100% Score: 68.66  Mobility Inpatient CMS G-Code Modifier : CL         Goals  Short Term Goals  Time Frame for Short Term Goals: 12/10 (7 days) unless otherwise stated  Short Term Goal 1: Pt will perform supine <> sit with min A  Short Term Goal 2: Pt will perform all transfers with LRAD and min A  Short Term Goal 3: Pt will ambulate 25 ft with LRAD and min A  Short Term Goal 4: Pt will perform 10 reps of BLE exercises by 12/7  Patient Goals   Patient Goals : \"I would love to walk again\"       Education  Patient Education  Education Given To: Patient  Education Provided: Role of Therapy;Plan of Care;Transfer Training  Education Provided Comments: Disease Specific Education: Pt educated on importance of OOB mobility, prevention of complications of bedrest, and general safety during hospitalization. Safety w/ tranfers, ambulation, RW management.  Education Method: Verbal  Barriers to Learning: Cognition;Vision  Education Outcome: Verbalized understanding;Continued education needed      Therapy Time   Individual Concurrent Group Co-treatment   Time In       0907   Time Out       0943   Minutes       36   Timed Code Treatment Minutes: 24 Minutes       Mariza Romo, [place] [id number]

## 2024-12-03 NOTE — PROGRESS NOTES
Message sent to Dr. Pepe regarding femoral line. Nursing was able to place a peripheral IV this shift. Awaiting response. Electronically signed by CLAUDIA GARDINER RN on 12/3/24 at 5:27 PM EST

## 2024-12-03 NOTE — PROGRESS NOTES
[facility] SURGERY    PATIENT NAME: Annie Singleton     TODAY'S DATE: 12/3/2024    CHIEF COMPLAINT: none    INTERVAL HISTORY/HPI:    Pt with minimal pain, no nausea    REVIEW OF SYSTEMS:  Pertinent positives and negatives as per interval history section    OBJECTIVE:  VITALS:  /70   Pulse 72   Temp 97.7 °F (36.5 °C) (Oral)   Resp 16   Ht 1.651 m (5' 5\")   Wt 119.8 kg (264 lb 1.8 oz)   SpO2 97%   BMI 43.95 kg/m²     INTAKE/OUTPUT:    I/O last 3 completed shifts:  In: 705 [I.V.:600; IV Piggyback:105]  Out: 1900 [Urine:1900]  I/O this shift:  In: 10 [I.V.:10]  Out: 500 [Urine:500]    CONSTITUTIONAL:  awake and alert  LUNGS:  Respirations easy and unlabored  CARD:  regular rate and rhythm  ABDOMEN:  normal bowel sounds, soft, non-distended, appropriate-tender     Data:  CBC:   Recent Labs     12/02/24  0354 12/03/24  0528   WBC 7.4 13.3*   HGB 11.5* 11.4*   HCT 34.4* 34.2*    251     BMP:    Recent Labs     12/01/24  0409 12/02/24  0354 12/03/24  0528   * 139 139   K 3.4* 3.8 4.2   CL 98* 101 104   CO2 27 29 27   BUN 15 13 12   CREATININE 1.0 0.8 0.8   GLUCOSE 130* 122* 125*     Hepatic:   Recent Labs     12/02/24  0354   AST 79*   ALT 43*   BILITOT 0.7   ALKPHOS 83     Mag:      Recent Labs     12/01/24  0409   MG 2.06      Phos:   No results for input(s): \"PHOS\" in the last 72 hours.   INR:   Recent Labs     12/02/24  0354   INR 1.09       Radiology Review:  *Imaging personally reviewed by me.   NA      ASSESSMENT AND PLAN:  85 yo s/p robotic norman  Doing well postop   Low fat diet     Electronically signed by Zachary Escobar MD     [id number]

## 2024-12-03 NOTE — OP NOTE
Operative Note      Patient: Annie Singleton  YOB: 1940  MRN: [id number]    Date of Procedure: 12/2/2024    Pre-Op Diagnosis Codes:      * Acute cholecystitis [K81.0]    Post-Op Diagnosis: Same       Procedure(s):  Robotic cholecystectomy    Surgeon(s):  Zachary Escobar MD    Assistant:   Surgical Assistant: Nerissa Del Cid Shawna    Anesthesia: General    Estimated Blood Loss (mL): 10cc    Complications: None    Specimens:   ID Type Source Tests Collected by Time Destination   A : GALLBLADDER AND CONTENTS Tissue Gallbladder SURGICAL PATHOLOGY Zachary Escobar MD 12/2/2024 1620        Implants:  * No implants in log *      Drains:   [REMOVED] Urinary Catheter 11/30/24 Victoria (Removed)   $ Urethral catheter insertion $ Not inserted for procedure 11/30/24 1343   Catheter Indications Prolonged immobilization (e.g. unstable thoracic or lumbar spine, multiple traumatic injuries such as pelvic fractures) 12/03/24 0747   Site Assessment Moist;Red;No urethral drainage 12/03/24 0747   Urine Color Genia 12/03/24 0747   Urine Appearance Clear 12/03/24 0747   Urine Odor Malodorous 12/03/24 0747   Collection Container Standard 12/03/24 0747   Securement Method Securing device (Describe) 12/03/24 0747   Catheter Care  Perineal wipes 12/03/24 0504   Catheter Best Practices  Drainage tube clipped to bed;Catheter secured to thigh;Tamper seal intact;Bag below bladder;Bag not on floor;Lack of dependent loop in tubing;Drainage bag less than half full 12/03/24 0747   Status Draining;Patent 12/03/24 0747   Output (mL) 300 mL 12/03/24 1143       [REMOVED] External Urinary Catheter (Removed)   Site Assessment Clean,dry & intact 11/30/24 0400   Placement Replaced 11/30/24 0400   Securement Method Securing device (Describe) 11/30/24 0400   Catheter Care Catheter/Wick replaced 11/30/24 0400   Perineal Care Yes 11/29/24 1838   Suction 40 mmgHg continuous 11/30/24 0400   Urine Color Yellow 11/30/24 0400   Urine  Appearance Clear 11/30/24 0400   Urine Odor Malodorous 11/30/24 0400   Output (mL) 600 mL 11/30/24 0400       Findings:  Infection Present At Time Of Surgery (PATOS) (choose all levels that have infection present):  - Organ Space infection (below fascia) present as evidenced by fluid consistent with infection  Other Findings: Intrahepatic gallbladder with evidence of chronic inflammation and tissue was extremely friable.    Indications for the Procedure:  Annie Singleton is a 84-year-old female, and abdominal pain.  Patient admits to multiple episodes of similar nausea and epigastric pain that radiates to her right upper quadrant.  Right upper quadrant ultrasound demonstrated evidence of cholelithiasis with cholecystitis, thickened gallbladder wall but no evidence of duct dilation.  HIDA also consistent with cholecystitis.  Risks and benefits of surgical management discussed with the patient and her family including, bleeding, infection, damage to surrounding structures, need for multiple procedures, and possible drain placement and the patient as well as her family agreed to proceed with a robotic cholecystectomy.    Detailed Description of Procedure:   Patient was brought to the operating room placed in the supine position.  General anesthesia was initiated and patient was intubated.  All pressure points were appropriately padded.  Patient was prepped and draped in usual sterile fashion.  Timeouts were performed to confirm correct patient and procedure.    A 5 mm incision was made in the left upper quadrant at the midclavicular line at Shetty's point and Optiview was used to insufflate the abdomen.  The patient tolerated insufflation well.  Next, the laparoscope was inserted into the abdomen and the abdomen was inspected to ensure no injuries occurred with initial port placement.  Additional ports were then placed as follows: An 8 mm port in the right upper quadrant, a 12 mm port in the supraumbilical region and a

## 2024-12-03 NOTE — PROGRESS NOTES
A&Ox4. Denied chest pain/heaviness, SOB/. With tolerable pain on incision sites.   SR x 2.   Bed on lowest position. Bed alarm on.   With intact lap sites, no bleeding or discharges.   Call light within reach.

## 2024-12-03 NOTE — PROGRESS NOTES
Perfect serve sent to Manuel Rangel regarding new PT/OT orders. Electronically signed by CLAUDIA GARDINER RN on 12/3/24 at 8:52 AM EST

## 2024-12-04 LAB
ANION GAP SERPL CALCULATED.3IONS-SCNC: 8 MMOL/L (ref 3–16)
BUN SERPL-MCNC: 13 MG/DL (ref 7–20)
CALCIUM SERPL-MCNC: 9 MG/DL (ref 8.3–10.6)
CHLORIDE SERPL-SCNC: 102 MMOL/L (ref 99–110)
CO2 SERPL-SCNC: 26 MMOL/L (ref 21–32)
CREAT SERPL-MCNC: 0.8 MG/DL (ref 0.6–1.2)
DEPRECATED RDW RBC AUTO: 13.7 % (ref 12.4–15.4)
GFR SERPLBLD CREATININE-BSD FMLA CKD-EPI: 73 ML/MIN/{1.73_M2}
GLUCOSE SERPL-MCNC: 94 MG/DL (ref 70–99)
HCT VFR BLD AUTO: 35.9 % (ref 36–48)
HGB BLD-MCNC: 11.8 G/DL (ref 12–16)
MCH RBC QN AUTO: 31.6 PG (ref 26–34)
MCHC RBC AUTO-ENTMCNC: 33 G/DL (ref 31–36)
MCV RBC AUTO: 95.9 FL (ref 80–100)
PLATELET # BLD AUTO: 247 K/UL (ref 135–450)
PMV BLD AUTO: 7.9 FL (ref 5–10.5)
POTASSIUM SERPL-SCNC: 4.6 MMOL/L (ref 3.5–5.1)
RBC # BLD AUTO: 3.74 M/UL (ref 4–5.2)
REASON FOR REJECTION: NORMAL
REJECTED TEST: NORMAL
SODIUM SERPL-SCNC: 136 MMOL/L (ref 136–145)
TROPONIN, HIGH SENSITIVITY: 21 NG/L (ref 0–14)
WBC # BLD AUTO: 9.8 K/UL (ref 4–11)

## 2024-12-04 PROCEDURE — 6370000000 HC RX 637 (ALT 250 FOR IP): Performed by: SURGERY

## 2024-12-04 PROCEDURE — 6360000002 HC RX W HCPCS: Performed by: SURGERY

## 2024-12-04 PROCEDURE — 84484 ASSAY OF TROPONIN QUANT: CPT

## 2024-12-04 PROCEDURE — 85027 COMPLETE CBC AUTOMATED: CPT

## 2024-12-04 PROCEDURE — 2580000003 HC RX 258: Performed by: SURGERY

## 2024-12-04 PROCEDURE — 36415 COLL VENOUS BLD VENIPUNCTURE: CPT

## 2024-12-04 PROCEDURE — 99024 POSTOP FOLLOW-UP VISIT: CPT | Performed by: SURGERY

## 2024-12-04 PROCEDURE — 99233 SBSQ HOSP IP/OBS HIGH 50: CPT | Performed by: INTERNAL MEDICINE

## 2024-12-04 PROCEDURE — 80048 BASIC METABOLIC PNL TOTAL CA: CPT

## 2024-12-04 PROCEDURE — 2700000000 HC OXYGEN THERAPY PER DAY

## 2024-12-04 PROCEDURE — 94761 N-INVAS EAR/PLS OXIMETRY MLT: CPT

## 2024-12-04 PROCEDURE — 1200000000 HC SEMI PRIVATE

## 2024-12-04 RX ADMIN — GABAPENTIN 100 MG: 100 CAPSULE ORAL at 20:40

## 2024-12-04 RX ADMIN — OXYCODONE 10 MG: 5 TABLET ORAL at 11:14

## 2024-12-04 RX ADMIN — CETIRIZINE HYDROCHLORIDE 10 MG: 10 TABLET, FILM COATED ORAL at 08:48

## 2024-12-04 RX ADMIN — Medication: at 08:47

## 2024-12-04 RX ADMIN — GABAPENTIN 100 MG: 100 CAPSULE ORAL at 08:48

## 2024-12-04 RX ADMIN — MEROPENEM 1000 MG: 1 INJECTION INTRAVENOUS at 16:37

## 2024-12-04 RX ADMIN — SODIUM CHLORIDE, PRESERVATIVE FREE 10 ML: 5 INJECTION INTRAVENOUS at 20:42

## 2024-12-04 RX ADMIN — ENOXAPARIN SODIUM 30 MG: 100 INJECTION SUBCUTANEOUS at 08:48

## 2024-12-04 RX ADMIN — GABAPENTIN 100 MG: 100 CAPSULE ORAL at 16:27

## 2024-12-04 RX ADMIN — FLUTICASONE PROPIONATE 1 SPRAY: 50 SPRAY, METERED NASAL at 08:49

## 2024-12-04 RX ADMIN — ASPIRIN 81 MG: 81 TABLET, CHEWABLE ORAL at 08:48

## 2024-12-04 RX ADMIN — SODIUM CHLORIDE: 9 INJECTION, SOLUTION INTRAVENOUS at 16:34

## 2024-12-04 RX ADMIN — PANTOPRAZOLE SODIUM 40 MG: 40 TABLET, DELAYED RELEASE ORAL at 08:48

## 2024-12-04 RX ADMIN — ACETAMINOPHEN 650 MG: 325 TABLET ORAL at 20:39

## 2024-12-04 RX ADMIN — MEROPENEM 1000 MG: 1 INJECTION INTRAVENOUS at 23:18

## 2024-12-04 RX ADMIN — ENOXAPARIN SODIUM 30 MG: 100 INJECTION SUBCUTANEOUS at 20:40

## 2024-12-04 RX ADMIN — PANTOPRAZOLE SODIUM 40 MG: 40 TABLET, DELAYED RELEASE ORAL at 16:28

## 2024-12-04 RX ADMIN — ALPRAZOLAM 0.5 MG: 0.25 TABLET ORAL at 00:36

## 2024-12-04 RX ADMIN — ATORVASTATIN CALCIUM 40 MG: 40 TABLET, FILM COATED ORAL at 20:39

## 2024-12-04 RX ADMIN — MEROPENEM 1000 MG: 1 INJECTION INTRAVENOUS at 00:36

## 2024-12-04 RX ADMIN — MEROPENEM 1000 MG: 1 INJECTION INTRAVENOUS at 08:47

## 2024-12-04 RX ADMIN — ACETAMINOPHEN 650 MG: 325 TABLET ORAL at 08:48

## 2024-12-04 ASSESSMENT — PAIN SCALES - GENERAL
PAINLEVEL_OUTOF10: 3
PAINLEVEL_OUTOF10: 0
PAINLEVEL_OUTOF10: 0
PAINLEVEL_OUTOF10: 8

## 2024-12-04 ASSESSMENT — PAIN DESCRIPTION - ORIENTATION: ORIENTATION: RIGHT;LEFT

## 2024-12-04 ASSESSMENT — PAIN DESCRIPTION - LOCATION: LOCATION: ARM

## 2024-12-04 ASSESSMENT — PAIN DESCRIPTION - DESCRIPTORS: DESCRIPTORS: ACHING

## 2024-12-04 NOTE — PROGRESS NOTES
4 Eyes Skin Assessment     The patient is being assess for  Low Ben    I agree that 2 RN's have performed a thorough Head to Toe Skin Assessment on the patient. ALL assessment sites listed below have been assessed.       Areas assessed by both nurses: Marce RN, Claudia RN  [x]   Head, Face, and Ears   [x]   Shoulders, Back, and Chest  [x]   Arms, Elbows, and Hands   [x]   Coccyx, Sacrum, and IschIum  [x]   Legs, Feet, and Heels        Does the Patient have Skin Breakdown?   Fissure on coccyx, purple/redness on coccyx (blanchable)- stage 2, scattered ecchymosis          Ben Prevention initiated:  Yes   Wound Care Orders initiated:  Yes      WOC nurse consulted for Pressure Injury (Stage 3,4, Unstageable, DTI, NWPT, and Complex wounds), New and Established Ostomies:  Yes      Nurse 1 eSignature: Electronically signed by MARCE ROBLES RN on 12/4/24 at 2:58 PM EST    **SHARE this note so that the co-signing nurse is able to place an eSignature**    Nurse 2 eSignature: Electronically signed by CLAUDIA GARDINER RN on 12/4/24 at 2:58 PM EST

## 2024-12-04 NOTE — PLAN OF CARE
Problem: Discharge Planning  Goal: Discharge to home or other facility with appropriate resources  Outcome: Progressing  Flowsheets (Taken 12/4/2024 1224)  Discharge to home or other facility with appropriate resources: Identify barriers to discharge with patient and caregiver     Problem: Pain  Goal: Verbalizes/displays adequate comfort level or baseline comfort level  Outcome: Progressing  Flowsheets (Taken 12/4/2024 1224)  Verbalizes/displays adequate comfort level or baseline comfort level:   Administer analgesics based on type and severity of pain and evaluate response   Encourage patient to monitor pain and request assistance   Notify Licensed Independent Practitioner if interventions unsuccessful or patient reports new pain   Assess pain using appropriate pain scale   Implement non-pharmacological measures as appropriate and evaluate response     Problem: Safety - Adult  Goal: Free from fall injury  Outcome: Progressing  Flowsheets (Taken 12/4/2024 1224)  Free From Fall Injury: Instruct family/caregiver on patient safety

## 2024-12-04 NOTE — PROGRESS NOTES
[facility]    PATIENT NAME: Annie Singleton       POD # 2    TODAY'S DATE: 12/4/2024    CHIEF COMPLAINT: shoulder pain    INTERVAL HISTORY/HPI:    Pt reports having increased shoulder pain overnight. Denies nausea and ate a good breakfast.   Daughter at bedside.     REVIEW OF SYSTEMS:  Pertinent positives and negatives as per interval history section    OBJECTIVE:  VITALS:  /77   Pulse 63   Temp 97.5 °F (36.4 °C) (Oral)   Resp 18   Ht 1.651 m (5' 5\")   Wt 119.8 kg (264 lb 1.8 oz)   SpO2 96%   BMI 43.95 kg/m²     INTAKE/OUTPUT:    I/O last 3 completed shifts:  In: 10 [I.V.:10]  Out: 950 [Urine:950]  No intake/output data recorded.    CONSTITUTIONAL:  awake and alert  LUNGS:  Respirations easy and unlabored  CARD:  regular rate and rhythm  ABDOMEN:  normal bowel sounds, soft, non-distended, appropriate-tender, incisions clean and dry    Data:  CBC:   Recent Labs     12/02/24  0354 12/03/24  0528 12/04/24  0942   WBC 7.4 13.3* 9.8   HGB 11.5* 11.4* 11.8*   HCT 34.4* 34.2* 35.9*    251 247     BMP:    Recent Labs     12/02/24  0354 12/03/24  0528 12/04/24  0828    139 136   K 3.8 4.2 4.6    104 102   CO2 29 27 26   BUN 13 12 13   CREATININE 0.8 0.8 0.8   GLUCOSE 122* 125* 94     Hepatic:   Recent Labs     12/02/24  0354   AST 79*   ALT 43*   BILITOT 0.7   ALKPHOS 83     Mag:      No results for input(s): \"MG\" in the last 72 hours.     Phos:   No results for input(s): \"PHOS\" in the last 72 hours.   INR:   Recent Labs     12/02/24 0354   INR 1.09         ASSESSMENT AND PLAN:  83 yo s/p robotic norman    Continue with diet as tolerated.   Continue with pain control    Will not need further antibiotics once completed from gallbladder standpoint.     Morbid Obesity: BMI : 43.9 Complicating assessment and treatment. Placing patient at risk for multiple co-morbidities and complications.      Electronically signed by KIRILL Philip - CNP     Patient seen and agree with

## 2024-12-04 NOTE — CARE COORDINATION
CM update note  PD #8  POD #2 cholecystectomy  Will receive last dose IVABx 12/5. Plan return to [facility] after dose.   Call to estrellita (liaison) - ABIGAIL with update.    Shaniqua Bolton RN

## 2024-12-04 NOTE — PROGRESS NOTES
Assessment completed and documented. A&O x4 at this time. Incisions to abdomen well approximated. PRN medications given for c/o generalized pain. Daughter remains at bedside. Remains on IV ATB with no side effects noted.  Bed in lowest position, call light in reach, bed alarm in place, non-skid socks on. Denies any needs at this time. Electronically signed by CLAUDIA GARDINER RN on 12/4/24 at 11:32 AM EST

## 2024-12-04 NOTE — PROGRESS NOTES
ID     Chart reviewed   She remains AF   WBC is wnl     POD #2 robotic CCY without apparent complications   Tolerating low fat diet     Abx day 9/10 for Psa bacteremia in the context of acute cholecystitis   Abx beyond that at the discretion of Sgy / Hospitalist     Signing off, please call with questions   ROEL NAIK MD

## 2024-12-04 NOTE — PROGRESS NOTES
A&Ox4. Denied chest pain/heaviness, SOB/.   SR x 2.   Bed on lowest position. Bed alarm on.   With intact lap sites, no bleeding or discharges.   Call light within reach.   With intermittent pain on chest and lap sites.

## 2024-12-04 NOTE — PROGRESS NOTES
Assessment:  Severity:  4 / 10  Quality of pain: aching  Wound Pain Timing/Severity: intermittent  Premedicated: No  Plan:   Plan of Care: Wound 11/26/24 Buttocks Mid;Posterior purple/red blanchable,-Dressing/Treatment: Triad hydro/zinc oxide-based hydrophilic paste  Wound 11/27/24 Groin Left-Dressing/Treatment: Interdry Ag/wicking fabric with Ag  D/C triad paste  Start Zinc paste to coccyx.   Fissure healed at this time.    Recommendation  Daily and PRN with darvin care cleanse within coccyx crease & pat dry.  Apply Zinc paste into coccyx to open fissure.   Keep sacral heart over sacrum as bony prominence protection.    Place as needed Inter dry into abdomen, bi lateral groins, under breast related to moisture.        Specialty Bed Required : Yes   [] Low Air Loss   [x] Pressure Redistribution  [] Fluid Immersion  [] Bariatric  [] Total Pressure Relief  [] Other:     Current Diet: ADULT DIET; Regular; Low Fat (less than or equal to 50 gm/day)  ADULT ORAL NUTRITION SUPPLEMENT; AM Snack, PM Snack; Standard High Calorie/High Protein Oral Supplement  Dietician consult:  Yes    Discharge Plan:  Placement for patient upon discharge: skilled nursing   Patient appropriate for Outpatient Wound Care Center: No    Referrals:  [x]   [] Home Health Care  [] Supplies  [] Other    Patient/Caregiver Teaching:  Level of patient/caregiver understanding able to: daughter & patient  [] Indicates understanding       [x] Needs reinforcement  [] Unsuccessful      [] Verbal Understanding  [] Demonstrated understanding       [] No evidence of learning  [] Refused teaching         [] N/A       Electronically signed by Mandy Bateman RN, on 12/4/2024 at 11:42 AM

## 2024-12-04 NOTE — PROGRESS NOTES
Pt complaining of pain in her chest that radiates to her abdomen. Had recent lap norman on 12/2. Rates pain 3/10. VSS for patient. STAT EKG ordered per protocol. Perfect serve sent to Dr. Pepe. Electronically signed by CLAUDIA GARDINER RN on 12/3/24 at 7:01 PM EST

## 2024-12-04 NOTE — PROGRESS NOTES
Electrophysiology Progress Note     Admit Date: 2024     Reason for follow up: Polymorphic ventricular tachycardia    Interval History:   - Patient seen and examined.   - Clinical notes reviewed.   - Telemetry reviewed.  Atrial fibrillation with controlled rates.  - Ongoing chest pain that reminds her of presenting pain with radiation to shoulders and neck bilaterally radiating to ears and abdomen.  - No major events overnight.     Physical Examination:  Vitals:    24 0800   BP: 134/76   Pulse: 74   Resp: 18   Temp: 98.2 °F (36.8 °C)   SpO2: 96%        Intake/Output Summary (Last 24 hours) at 2024 09  Last data filed at 12/3/2024 1143  Gross per 24 hour   Intake --   Output 300 ml   Net -300 ml     In: 10 [I.V.:10]  Out: 500    Wt Readings from Last 3 Encounters:   24 119.8 kg (264 lb 1.8 oz)   24 120.2 kg (265 lb)   24 120.2 kg (265 lb)     Temp  Av °F (36.7 °C)  Min: 97.4 °F (36.3 °C)  Max: 99 °F (37.2 °C)  Pulse  Av.5  Min: 65  Max: 79  BP  Min: 110/58  Max: 135/56  SpO2  Av.5 %  Min: 94 %  Max: 100 %    Telemetry: Rate controlled atrial fibrillation  Constitutional: Alert. Oriented to person, place, and time. No distress.   Head: Normocephalic and atraumatic.   Mouth/Throat: Lips appear moist. Oropharynx is clear and moist.  Eyes: Conjunctivae normal. EOM are normal.   Cardiovascular: Irregular rate and rhythm w/o M/G/R  Pulmonary/Chest: Bilateral respiratory sounds present. No respiratory accessory muscle use.    Abdominal: Soft. Normal bowel sounds present. No distension, No tenderness.   Musculoskeletal: No tenderness. +1 bilateral lower extremity edema    Psychiatric: No anxiety nor agitation.    Labs, diagnostic and imaging results reviewed.   Reviewed.   Recent Labs     24    139   K 3.8 4.2    104   CO2 29 27   BUN 13 12   CREATININE 0.8 0.8     Recent Labs     24   WBC 7.4 13.3*   HGB  recurrent falls per patient and family's preferences.  Her rates are controlled.    Acute cholecystitis.   - Per primary team.    Thank you for allowing me to participate in the care of this patient. If you have any questions, please do not hesitate to contact me.    Gurwinder Pepe MD  Cardiac Electrophysiology  [facility]  [phone] [facility]

## 2024-12-04 NOTE — PROGRESS NOTES
[facility] Medicine Progress Note  V 10.25      Date of Admission: 11/26/2024    Hospital Day: 9      Chief Admission Complaint:  Chest Pain (Pt from [facility]..pt is dnr-cc..Pt to er with cp , but pointing more towards belly. Pt is in afib with hx of same.. per squad pt was taken off thinner for a procedure.) and Abdominal Pain    Subjective:      Patient c/o shoulder pain/abd pain, reports did not sleep well    Presenting Admission History:       84 y.o. female [facility].  PMHx significant for legally blind, depression, anxiety atrial fibrillation GERD hypertension bronchial asthma.  She lives in nursing home  Developed chest pain and epigastric abdominal pain this morning  Chest pain was in mid chest, 7 in intensity not associated nausea vomiting or shortness of breath.  She has epigastric and left upper quadrant pain which started the same time with a chest pain  Currently she does not have chest pain or abdominal pain  On 6 L oxygen, she told she had cough yesterday not sure about the sputum, no fever  No change in mental status or any focal neurological symptoms  She denies dizziness or syncope or palpitation   she normally walks with a walker.    She has poor short-term memory   History is from patient ER physician and daughters    Assessment/Plan:      Current Principal Problem:  Chest pain    Chest pain - Initially concerning for ACS but possibly secondary to multiple episodes of VTACH in ED, resolved  Initial Troponin 28 ---> 32.    EKG AFIB rate 75.    - Followed serial Troponins, reviewed and documented, and monitor on tele - currently w/out evidence of ischemia and/or arrhythmia.    - Continue aspirin 81 mg daily  - Continue Lipitor 40 mg daily      Troponin elevation - c/w myocardial injury 2nd to      [] STEMI  [] NSTEMI  [] Acute/Chronic Myocardial Injury 2nd to CHF  [] Recent MI (with 4 weeks of admission) dated:   [] Type II MI w/ demand ischemia  []  The level of diabetic retinopathy was communicated to provider.

## 2024-12-05 LAB
ANION GAP SERPL CALCULATED.3IONS-SCNC: 7 MMOL/L (ref 3–16)
BUN SERPL-MCNC: 14 MG/DL (ref 7–20)
CALCIUM SERPL-MCNC: 8.6 MG/DL (ref 8.3–10.6)
CHLORIDE SERPL-SCNC: 103 MMOL/L (ref 99–110)
CO2 SERPL-SCNC: 28 MMOL/L (ref 21–32)
CREAT SERPL-MCNC: 0.7 MG/DL (ref 0.6–1.2)
DEPRECATED RDW RBC AUTO: 13.9 % (ref 12.4–15.4)
GFR SERPLBLD CREATININE-BSD FMLA CKD-EPI: 86 ML/MIN/{1.73_M2}
GLUCOSE SERPL-MCNC: 101 MG/DL (ref 70–99)
HCT VFR BLD AUTO: 31.3 % (ref 36–48)
HGB BLD-MCNC: 10.7 G/DL (ref 12–16)
MCH RBC QN AUTO: 32.2 PG (ref 26–34)
MCHC RBC AUTO-ENTMCNC: 34.2 G/DL (ref 31–36)
MCV RBC AUTO: 94.2 FL (ref 80–100)
PLATELET # BLD AUTO: 236 K/UL (ref 135–450)
PMV BLD AUTO: 8 FL (ref 5–10.5)
POTASSIUM SERPL-SCNC: 4.3 MMOL/L (ref 3.5–5.1)
RBC # BLD AUTO: 3.32 M/UL (ref 4–5.2)
SODIUM SERPL-SCNC: 138 MMOL/L (ref 136–145)
WBC # BLD AUTO: 7.1 K/UL (ref 4–11)

## 2024-12-05 PROCEDURE — 6370000000 HC RX 637 (ALT 250 FOR IP): Performed by: SURGERY

## 2024-12-05 PROCEDURE — 36415 COLL VENOUS BLD VENIPUNCTURE: CPT

## 2024-12-05 PROCEDURE — 6360000002 HC RX W HCPCS: Performed by: SURGERY

## 2024-12-05 PROCEDURE — 6370000000 HC RX 637 (ALT 250 FOR IP): Performed by: NURSE PRACTITIONER

## 2024-12-05 PROCEDURE — 1200000000 HC SEMI PRIVATE

## 2024-12-05 PROCEDURE — 2580000003 HC RX 258: Performed by: SURGERY

## 2024-12-05 PROCEDURE — 97530 THERAPEUTIC ACTIVITIES: CPT

## 2024-12-05 PROCEDURE — 80048 BASIC METABOLIC PNL TOTAL CA: CPT

## 2024-12-05 PROCEDURE — 85027 COMPLETE CBC AUTOMATED: CPT

## 2024-12-05 PROCEDURE — 99024 POSTOP FOLLOW-UP VISIT: CPT | Performed by: SURGERY

## 2024-12-05 PROCEDURE — 99232 SBSQ HOSP IP/OBS MODERATE 35: CPT | Performed by: NURSE PRACTITIONER

## 2024-12-05 RX ORDER — POLYETHYLENE GLYCOL 3350 17 G/17G
17 POWDER, FOR SOLUTION ORAL 2 TIMES DAILY
Status: DISCONTINUED | OUTPATIENT
Start: 2024-12-05 | End: 2024-12-06 | Stop reason: HOSPADM

## 2024-12-05 RX ORDER — DOCUSATE SODIUM 100 MG/1
100 CAPSULE, LIQUID FILLED ORAL 2 TIMES DAILY
Status: DISCONTINUED | OUTPATIENT
Start: 2024-12-05 | End: 2024-12-06 | Stop reason: HOSPADM

## 2024-12-05 RX ADMIN — POLYETHYLENE GLYCOL 3350 17 G: 17 POWDER, FOR SOLUTION ORAL at 11:47

## 2024-12-05 RX ADMIN — Medication: at 08:54

## 2024-12-05 RX ADMIN — ASPIRIN 81 MG: 81 TABLET, CHEWABLE ORAL at 08:54

## 2024-12-05 RX ADMIN — ENOXAPARIN SODIUM 30 MG: 100 INJECTION SUBCUTANEOUS at 20:29

## 2024-12-05 RX ADMIN — GABAPENTIN 100 MG: 100 CAPSULE ORAL at 20:28

## 2024-12-05 RX ADMIN — ACETAMINOPHEN 650 MG: 325 TABLET ORAL at 16:57

## 2024-12-05 RX ADMIN — DOCUSATE SODIUM 100 MG: 100 CAPSULE, LIQUID FILLED ORAL at 11:47

## 2024-12-05 RX ADMIN — GABAPENTIN 100 MG: 100 CAPSULE ORAL at 08:54

## 2024-12-05 RX ADMIN — MEROPENEM 1000 MG: 1 INJECTION INTRAVENOUS at 17:03

## 2024-12-05 RX ADMIN — GABAPENTIN 100 MG: 100 CAPSULE ORAL at 16:57

## 2024-12-05 RX ADMIN — ACETAMINOPHEN 650 MG: 325 TABLET ORAL at 05:00

## 2024-12-05 RX ADMIN — ENOXAPARIN SODIUM 30 MG: 100 INJECTION SUBCUTANEOUS at 08:54

## 2024-12-05 RX ADMIN — PANTOPRAZOLE SODIUM 40 MG: 40 TABLET, DELAYED RELEASE ORAL at 16:57

## 2024-12-05 RX ADMIN — SODIUM CHLORIDE, PRESERVATIVE FREE 10 ML: 5 INJECTION INTRAVENOUS at 20:30

## 2024-12-05 RX ADMIN — ATORVASTATIN CALCIUM 40 MG: 40 TABLET, FILM COATED ORAL at 20:30

## 2024-12-05 RX ADMIN — MEROPENEM 1000 MG: 1 INJECTION INTRAVENOUS at 08:53

## 2024-12-05 RX ADMIN — FLUTICASONE PROPIONATE 1 SPRAY: 50 SPRAY, METERED NASAL at 09:05

## 2024-12-05 RX ADMIN — OXYCODONE 5 MG: 5 TABLET ORAL at 08:54

## 2024-12-05 RX ADMIN — OXYCODONE 5 MG: 5 TABLET ORAL at 16:57

## 2024-12-05 RX ADMIN — PANTOPRAZOLE SODIUM 40 MG: 40 TABLET, DELAYED RELEASE ORAL at 05:00

## 2024-12-05 RX ADMIN — SODIUM CHLORIDE, PRESERVATIVE FREE 10 ML: 5 INJECTION INTRAVENOUS at 08:51

## 2024-12-05 RX ADMIN — CETIRIZINE HYDROCHLORIDE 10 MG: 10 TABLET, FILM COATED ORAL at 08:54

## 2024-12-05 ASSESSMENT — PAIN SCALES - GENERAL
PAINLEVEL_OUTOF10: 2
PAINLEVEL_OUTOF10: 6
PAINLEVEL_OUTOF10: 7
PAINLEVEL_OUTOF10: 4

## 2024-12-05 ASSESSMENT — PAIN DESCRIPTION - LOCATION
LOCATION: HEAD
LOCATION: HEAD

## 2024-12-05 NOTE — PROGRESS NOTES
[facility]    PATIENT NAME: Annie Singleton       POD # 3    TODAY'S DATE: 12/5/2024    CHIEF COMPLAINT: Mild abdominal pain    INTERVAL HISTORY/HPI:    Patient rested well overnight, denies nausea or vomiting, tolerating a diet, having minimal abdominal pain intermittently, expected.     REVIEW OF SYSTEMS:  Pertinent positives and negatives as per interval history section    OBJECTIVE:  VITALS:  /66   Pulse 71   Temp 97.5 °F (36.4 °C) (Oral)   Resp 18   Ht 1.651 m (5' 5\")   Wt 122.6 kg (270 lb 4.5 oz)   SpO2 95%   BMI 44.98 kg/m²     INTAKE/OUTPUT:    I/O last 3 completed shifts:  In: 240 [P.O.:240]  Out: 450 [Urine:450]  No intake/output data recorded.    CONSTITUTIONAL:  awake and alert  LUNGS:  Respirations easy and unlabored  CARD:  regular rate and rhythm  ABDOMEN:  normal bowel sounds, soft, non-distended, appropriate-tender, incisions clean and dry    Data:  CBC:   Recent Labs     12/03/24  0528 12/04/24  0942 12/05/24  0506   WBC 13.3* 9.8 7.1   HGB 11.4* 11.8* 10.7*   HCT 34.2* 35.9* 31.3*    247 236     BMP:    Recent Labs     12/03/24  0528 12/04/24  0828 12/05/24  0506    136 138   K 4.2 4.6 4.3    102 103   CO2 27 26 28   BUN 12 13 14   CREATININE 0.8 0.8 0.7   GLUCOSE 125* 94 101*     Hepatic:   No results for input(s): \"AST\", \"ALT\", \"BILITOT\", \"ALKPHOS\" in the last 72 hours.    Invalid input(s): \"ALB\"    Mag:      No results for input(s): \"MG\" in the last 72 hours.     Phos:   No results for input(s): \"PHOS\" in the last 72 hours.   INR:   No results for input(s): \"INR\" in the last 72 hours.        ASSESSMENT AND PLAN:  85 yo s/p robotic norman    Continue with diet as tolerated.   Continue with pain control.   Okay for discharge from a general surgery standpoint  Follow up in 2 weeks    Will not need further antibiotics once completed from gallbladder standpoint.     Morbid Obesity: BMI : 43.9 Complicating assessment and treatment. Placing patient at

## 2024-12-05 NOTE — PROGRESS NOTES
This RN called primary RN Patricia for follow up regarding the IV extravasation with contrast that occurred on 11/26/24. MATHEW Gomez states that patients' arms are both bruised at this time. Primary RN made aware of what to monitor patient for and to call IR team back if any symptoms worsen.

## 2024-12-05 NOTE — PROGRESS NOTES
Shift assessment completed. Patient is A&O x4. VSS. Denies Pain. IV site patent, flushed, and infusing. Patient on RA. side abdominal Incisions C/D/I. 4-Lap sites. Patient's last BM- was 12/5. Patient admits to passing gas. Patient ambulates with walker, X-1-SBA to bedside commode. Medication given per MAR. Pt last dose of antibiotics are due to be given today she will be ready for discharge in the morning.      Safety Measures in place:   Denies any needs at this time.   Video monitoring in place.  Bed/ Chair alarm on for safety.   Bed locked and in lowest position.    Call light within reach.   Gripper socks applied.   Patient in stable condition when RN leaving room.

## 2024-12-05 NOTE — PROGRESS NOTES
Occupational Therapy  Facility/Department: [facility] C3 TELE/MED SURG/ONC  Daily Treatment Note  NAME: Annie Singleton  : 1940  MRN: [id number]    Date of Service: 2024    Discharge Recommendations:  Subacute/Skilled Nursing Facility  OT Equipment Recommendations  Equipment Needed: No  Other: defer      Patient Diagnosis(es): The primary encounter diagnosis was Nonsustained ventricular tachycardia (HCC). Diagnoses of Elevated troponin, QT prolongation, Leukocytosis, unspecified type, Pneumonia due to infectious organism, unspecified laterality, unspecified part of lung, and Acute cholecystitis were also pertinent to this visit.     Assessment   Assessment: Pt supine in bed at start of session. Pt tolerates OT session well. Pt completes bed mobility with modA and functional transfer from bed to chair with minAx2 and RW. Pt requires verbal and tactile cues for transfer d/t visual impairment. Pt is limited by weakness and pain - OT recommends SNF to increase pt's independence with ADLs/ mobility. Pt will continue to benefit from continued skilled OT services at this time. Co-tx collaboration this date to safely meet goals and will have better occupational performance outcomes with in a co-treatment than 1:1 session.  Activity Tolerance: Patient tolerated treatment well  Discharge Recommendations: Subacute/Skilled Nursing Facility  Equipment Needed: No  Other: defer     Plan  Occupational Therapy Plan  Times Per Week: 3-5x/week  Current Treatment Recommendations: Strengthening;ROM;Functional mobility training;Endurance training;Safety education & training;Patient/Caregiver education & training;Equipment evaluation, education, & procurement;Self-Care / ADL;Co-Treatment;Positioning    Restrictions  Restrictions/Precautions  Restrictions/Precautions: Fall Risk;General Precautions  Position Activity Restriction  Other Position/Activity Restrictions: IV, flores, Blind;

## 2024-12-05 NOTE — PLAN OF CARE
Problem: Discharge Planning  Goal: Discharge to home or other facility with appropriate resources  12/4/2024 1224 by Kiana Salas RN  Outcome: Progressing  Flowsheets (Taken 12/4/2024 1224)  Discharge to home or other facility with appropriate resources: Identify barriers to discharge with patient and caregiver     Problem: Pain  Goal: Verbalizes/displays adequate comfort level or baseline comfort level  12/5/2024 0124 by Cindi Bautista RN  Outcome: Progressing  12/4/2024 1224 by Kiana Salas RN  Outcome: Progressing  Flowsheets (Taken 12/4/2024 1224)  Verbalizes/displays adequate comfort level or baseline comfort level:   Administer analgesics based on type and severity of pain and evaluate response   Encourage patient to monitor pain and request assistance   Notify Licensed Independent Practitioner if interventions unsuccessful or patient reports new pain   Assess pain using appropriate pain scale   Implement non-pharmacological measures as appropriate and evaluate response     Problem: Safety - Adult  Goal: Free from fall injury  12/5/2024 0124 by Cindi Bautista RN  Outcome: Progressing  12/4/2024 1224 by Kiana Salas RN  Outcome: Progressing  Flowsheets (Taken 12/4/2024 1224)  Free From Fall Injury: Instruct family/caregiver on patient safety     Problem: Skin/Tissue Integrity  Goal: Absence of new skin breakdown  Description: 1.  Monitor for areas of redness and/or skin breakdown  2.  Assess vascular access sites hourly  3.  Every 4-6 hours minimum:  Change oxygen saturation probe site  4.  Every 4-6 hours:  If on nasal continuous positive airway pressure, respiratory therapy assess nares and determine need for appliance change or resting period.  Outcome: Progressing     Problem: Respiratory - Adult  Goal: Achieves optimal ventilation and oxygenation  Outcome: Progressing     Problem: Skin/Tissue Integrity - Adult  Goal: Skin integrity remains intact  Outcome: Progressing

## 2024-12-05 NOTE — PROGRESS NOTES
Physical Therapy  Facility/Department: [facility] C3 TELE/MED SURG/ONC  Daily Treatment Note  NAME: Annie Singleton  : 1940  MRN: [id number]    Date of Service: 2024    Discharge Recommendations:  Subacute/Skilled Nursing Facility   PT Equipment Recommendations  Equipment Needed: No    Patient Diagnosis(es): The primary encounter diagnosis was Nonsustained ventricular tachycardia (HCC). Diagnoses of Elevated troponin, QT prolongation, Leukocytosis, unspecified type, Pneumonia due to infectious organism, unspecified laterality, unspecified part of lung, and Acute cholecystitis were also pertinent to this visit.    Assessment  Assessment: Pt seen as cotx with OT to maximize safety and functional gains. Pt required mod A x2 for bed mobility and min/mod A x2 for transfers with use of RW. Will continue to progress mobility as pt tolerates. Recommend SNF for continued therapy.  Activity Tolerance: Patient tolerated treatment well;Patient limited by fatigue;Patient limited by pain  Equipment Needed: No    Plan  Physical Therapy Plan  General Plan: 3-5 times per week  Current Treatment Recommendations: Strengthening;Balance training;Functional mobility training;Transfer training;Gait training;Stair training;Neuromuscular re-education;Home exercise program;Safety education & training;Therapeutic activities;Wheelchair mobility training    Restrictions  Restrictions/Precautions  Restrictions/Precautions: Fall Risk, General Precautions  Position Activity Restriction  Other Position/Activity Restrictions: IV, flores, Blind; telemetry     Subjective      Pt agreeable to therapy.   No direct c/o pain    Objective  Vitals  Pulse: 70  BP: 128/73  BP Location: Left lower arm  MAP (Calculated): 91  SpO2: 96 %  Bed Mobility Training  Bed Mobility Training: Yes  Supine to Sit: Moderate assistance  Sit to Supine: Other (comment) (up in chair at end of session)  Balance  Sitting: High guard  Standing: Impaired  Standing - Static:

## 2024-12-05 NOTE — PROGRESS NOTES
[facility]     Electrophysiology                                     Progress Note    Admission date:  2024    Reason for follow up visit: AF, PMVT     HPI/CC: Annie Singleton was admitted on 2024 with abdominal pain. In the ED, she had a prolonged episode of PMVT and EP was consulted. Work up revealed hypokalemia and acute cholecystitis. She had a robotic cholecystectomy.  Rhythm has been AF with well controlled rates and brief RVR.      Subjective: She has no complaints. Denies chest pain, palpitations, shortness of breath, and dizziness. Grand daughter at bedside.     Vitals:  Blood pressure 128/73, pulse 70, temperature 97.9 °F (36.6 °C), temperature source Oral, resp. rate 18, height 1.651 m (5' 5\"), weight 122.6 kg (270 lb 4.5 oz), SpO2 96%.  Temp  Av.9 °F (36.6 °C)  Min: 97.5 °F (36.4 °C)  Max: 98.6 °F (37 °C)  Pulse  Av.3  Min: 70  Max: 80  BP  Min: 108/66  Max: 152/78  SpO2  Av.8 %  Min: 93 %  Max: 96 %    24 hour I/O    Intake/Output Summary (Last 24 hours) at 2024 1334  Last data filed at 2024 0851  Gross per 24 hour   Intake 250 ml   Output 450 ml   Net -200 ml     Current Facility-Administered Medications   Medication Dose Route Frequency Provider Last Rate Last Admin    polyethylene glycol (GLYCOLAX) packet 17 g  17 g Oral BID Manuel Rangel APRN - CNP   17 g at 24 1147    docusate sodium (COLACE) capsule 100 mg  100 mg Oral BID Manuel Rangel APRN - CNP   100 mg at 24 1147    oxyCODONE (ROXICODONE) immediate release tablet 5 mg  5 mg Oral Q4H PRN Zachary Escobar MD   5 mg at 24 0854    Or    oxyCODONE (ROXICODONE) immediate release tablet 10 mg  10 mg Oral Q4H PRN Zachary Escobar MD   10 mg at 24 1114    cetirizine (ZYRTEC) tablet 10 mg  10 mg Oral Daily Zachary Escobar MD   10 mg at 24 0854    fluticasone (FLONASE) 50 MCG/ACT nasal spray 1 spray  1 spray Each Nostril Daily Zachary Escobar MD   1 spray at  injection 5-40 mL  5-40 mL IntraVENous PRN Zachary Escobar MD        0.9 % sodium chloride infusion   IntraVENous PRN Zachary Escobar MD 5 mL/hr at 12/04/24 1634 New Bag at 12/04/24 1634    acetaminophen (TYLENOL) tablet 650 mg  650 mg Oral Q6H PRN Zachary Escobar MD   650 mg at 12/05/24 0500    Or    acetaminophen (TYLENOL) suppository 650 mg  650 mg Rectal Q6H PRN Zachary Escobar MD        aspirin chewable tablet 81 mg  81 mg Oral Daily Zachary Escobar MD   81 mg at 12/05/24 0854    atorvastatin (LIPITOR) tablet 40 mg  40 mg Oral Nightly Zachary Escobar MD   40 mg at 12/04/24 2039    nitroGLYCERIN (NITROSTAT) SL tablet 0.4 mg  0.4 mg SubLINGual Q5 Min PRN Zachary Escobar MD        enoxaparin Sodium (LOVENOX) injection 30 mg  30 mg SubCUTAneous BID Zachary Escobar MD   30 mg at 12/05/24 0854       Objective:     Telemetry monitor: SR    Physical Exam:  Constitutional and general appearance: alert, cooperative, no distress, and appears stated age  HEENT: PERRL, no cervical lymphadenopathy. No masses palpable. Normal oral mucosa  Respiratory:  Normal excursion and expansion without use of accessory muscles  Resp auscultation: Normal breath sounds without wheezing, rhonchi, and rales  Cardiovascular:  The apical impulse is not displaced  Heart tones are crisp and normal. regular S1 and S2.  Jugular venous pulsation Normal  The carotid upstroke is normal in amplitude and contour without delay or bruit  Peripheral pulses are symmetrical and full   Abdomen:  No masses or tenderness  Bowel sounds present  Extremities:   No cyanosis or clubbing   No lower extremity edema   Skin: warm and dry  Neurological:  Alert and oriented  Moves all extremities well  No abnormalities of mood, affect, memory, mentation, or behavior are noted    Data  EKG 12/3/2024:   AF 74 bpm     Echo 11/2024:   Interpretation Summary         Left Ventricle: Normal left ventricular systolic function with a visually

## 2024-12-05 NOTE — CARE COORDINATION
Writer met with pt and granddaughter bedside, confirmed plan for return to [facility] today or tomorrow, pending IM plan.  Of note, pt did have BM and states she is feeling good now.  KIKI Santamaria-RN

## 2024-12-06 VITALS
HEIGHT: 65 IN | WEIGHT: 270.28 LBS | RESPIRATION RATE: 18 BRPM | SYSTOLIC BLOOD PRESSURE: 136 MMHG | HEART RATE: 73 BPM | TEMPERATURE: 97.4 F | DIASTOLIC BLOOD PRESSURE: 80 MMHG | BODY MASS INDEX: 45.03 KG/M2 | OXYGEN SATURATION: 97 %

## 2024-12-06 PROCEDURE — 6370000000 HC RX 637 (ALT 250 FOR IP): Performed by: NURSE PRACTITIONER

## 2024-12-06 PROCEDURE — 6370000000 HC RX 637 (ALT 250 FOR IP): Performed by: SURGERY

## 2024-12-06 PROCEDURE — 99024 POSTOP FOLLOW-UP VISIT: CPT | Performed by: SURGERY

## 2024-12-06 PROCEDURE — 2580000003 HC RX 258: Performed by: SURGERY

## 2024-12-06 PROCEDURE — 6360000002 HC RX W HCPCS: Performed by: SURGERY

## 2024-12-06 RX ORDER — POTASSIUM CHLORIDE 1500 MG/1
40 TABLET, EXTENDED RELEASE ORAL
COMMUNITY
Start: 2024-12-06

## 2024-12-06 RX ORDER — HYDROCODONE BITARTRATE AND ACETAMINOPHEN 5; 325 MG/1; MG/1
1 TABLET ORAL EVERY 6 HOURS PRN
Qty: 6 TABLET | Refills: 0 | Status: SHIPPED | OUTPATIENT
Start: 2024-12-06 | End: 2024-12-08

## 2024-12-06 RX ORDER — ATORVASTATIN CALCIUM 40 MG/1
40 TABLET, FILM COATED ORAL NIGHTLY
DISCHARGE
Start: 2024-12-06

## 2024-12-06 RX ORDER — ALPRAZOLAM 0.25 MG/1
0.5 TABLET ORAL NIGHTLY PRN
Qty: 4 TABLET | Refills: 0 | Status: SHIPPED | OUTPATIENT
Start: 2024-12-06 | End: 2024-12-08

## 2024-12-06 RX ORDER — ASPIRIN 81 MG/1
81 TABLET, CHEWABLE ORAL DAILY
DISCHARGE
Start: 2024-12-07

## 2024-12-06 RX ADMIN — GABAPENTIN 100 MG: 100 CAPSULE ORAL at 08:49

## 2024-12-06 RX ADMIN — SODIUM CHLORIDE, PRESERVATIVE FREE 10 ML: 5 INJECTION INTRAVENOUS at 08:50

## 2024-12-06 RX ADMIN — MEROPENEM 1000 MG: 1 INJECTION INTRAVENOUS at 08:49

## 2024-12-06 RX ADMIN — MEROPENEM 1000 MG: 1 INJECTION INTRAVENOUS at 00:47

## 2024-12-06 RX ADMIN — MORPHINE SULFATE 1 MG: 2 INJECTION, SOLUTION INTRAMUSCULAR; INTRAVENOUS at 00:37

## 2024-12-06 RX ADMIN — FLUTICASONE PROPIONATE 1 SPRAY: 50 SPRAY, METERED NASAL at 08:50

## 2024-12-06 RX ADMIN — GABAPENTIN 100 MG: 100 CAPSULE ORAL at 15:51

## 2024-12-06 RX ADMIN — ENOXAPARIN SODIUM 30 MG: 100 INJECTION SUBCUTANEOUS at 08:50

## 2024-12-06 RX ADMIN — ASPIRIN 81 MG: 81 TABLET, CHEWABLE ORAL at 08:49

## 2024-12-06 RX ADMIN — POLYETHYLENE GLYCOL 3350 17 G: 17 POWDER, FOR SOLUTION ORAL at 08:49

## 2024-12-06 RX ADMIN — DOCUSATE SODIUM 100 MG: 100 CAPSULE, LIQUID FILLED ORAL at 08:49

## 2024-12-06 RX ADMIN — CETIRIZINE HYDROCHLORIDE 10 MG: 10 TABLET, FILM COATED ORAL at 08:49

## 2024-12-06 RX ADMIN — ALPRAZOLAM 0.5 MG: 0.25 TABLET ORAL at 03:54

## 2024-12-06 RX ADMIN — PANTOPRAZOLE SODIUM 40 MG: 40 TABLET, DELAYED RELEASE ORAL at 15:51

## 2024-12-06 RX ADMIN — ACETAMINOPHEN 650 MG: 325 TABLET ORAL at 04:07

## 2024-12-06 RX ADMIN — ACETAMINOPHEN 650 MG: 325 TABLET ORAL at 10:46

## 2024-12-06 RX ADMIN — Medication: at 08:51

## 2024-12-06 ASSESSMENT — PAIN SCALES - GENERAL
PAINLEVEL_OUTOF10: 3
PAINLEVEL_OUTOF10: 0
PAINLEVEL_OUTOF10: 4

## 2024-12-06 ASSESSMENT — PAIN DESCRIPTION - ORIENTATION: ORIENTATION: RIGHT

## 2024-12-06 ASSESSMENT — PAIN DESCRIPTION - LOCATION: LOCATION: HEAD

## 2024-12-06 ASSESSMENT — PAIN DESCRIPTION - DESCRIPTORS: DESCRIPTORS: ACHING

## 2024-12-06 NOTE — PROGRESS NOTES
RN has called to give report to [facility] there has been no answer. RN called about 4 times it was like their phone was off the hook. RN waited about an hour and tried again this time the phone just kept ringing with answer.

## 2024-12-06 NOTE — DISCHARGE SUMMARY
Hospital Medicine Discharge Summary    Patient: Annie Singleton   : 1940     Admit Date: 2024   Discharge Date: 2024    Disposition:  []Home   []HHC  [x]SNF  []Acute Rehab  []LTAC  []Hospice  Code status:  [x]Full  []DNR/CCA  []Limited (DNR/CCA with Do Not Intubate)  []DNRCC  Condition at Discharge: Stable  Primary Care Provider: Zachary Grimm MD    Admitting Provider: Lissette Augustine MD  Discharge Provider: KIRILL Yung - CNP     Discharge Diagnoses:      Active Hospital Problems    Diagnosis     Bacteremia due to Pseudomonas [R78.81, B96.5]     Nonsustained ventricular tachycardia (HCC) [I47.29]     Leukocytosis [D72.829]     QT prolongation [R94.31]     Acute cholecystitis [K81.0]     Chest pain [R07.9]        Presenting Admission History:      84 y.o. female with a PMHx significant for legally blind, depression, anxiety, atrial fibrillation, GERD, hypertension, bronchial asthma, and  morbid obesity.who presented to [facility] with chest/abdominal pain. She lives in nursing home  Developed chest pain and epigastric abdominal pain PTA    Admitted 24 with acute epigastric pain from acute calculous cholecystitis-s/p lap norman, abx course completed     Acute, uncomplicated secondary bacteremia with Pseudomonas isolated in 1 of 2 sets of BC collected on admission on 24-repeat  cx's ngtd        Chest pain -possibly secondary to multiple episodes of VTACH in ED, resolved  Initial Troponin 28 ---> 32.    EKG AFIB rate 75.    - Followed serial Troponins, reviewed and documented, and monitor on tele - currently w/out evidence of ischemia and/or arrhythmia.    - Continue aspirin 81 mg daily  - Continue Lipitor 40 mg daily     Troponin elevation 2nd to                 [x] Demand ischemia w/out MI from  NSVT  [x] Clinically insignificant Troponin Elevation 2nd to COLLINS     of unclear clinical significance w/out signs and/or symptoms of active ischemia.  Documented and reviewed.  kyphoplasty changes seen at L1.  Postsurgical change is seen in the anterior abdominal wall.     No acute intra-abdominal abnormality Focal nodule left kidney, not clearly a simple cyst.  Recommend follow-up non urgent abdominal MRI, abdomen with and without IV contrast, renal protocol, to evaluate for any abnormal solid internal enhancement. Diverticulosis.  Appendix is normal in caliber.     Echo (TTE) complete (PRN contrast/bubble/strain/3D)    Result Date: 11/19/2024    Left Ventricle: Normal left ventricular systolic function with a visually estimated EF of 55 - 60%. Left ventricle size is normal. Normal wall thickness. Normal wall motion. Indeterminate diastolic function due to atrial fibrillation but probably normal.   Right Ventricle: Right ventricle size is normal. Normal systolic function.   Aortic Valve: No regurgitation. No stenosis.   Mitral Valve: Trace regurgitation.   Tricuspid Valve: Mild regurgitation. The estimated RVSP is 30 mmHg.   Pericardium: No pericardial effusion.   IVC/SVC: IVC diameter is less than or equal to 21 mm and decreases greater than 50% during inspiration; therefore the estimated right atrial pressure is normal (~3 mmHg). IVC size is normal.     CT HEAD W WO CONTRAST    Result Date: 11/7/2024  EXAMINATION: CT OF THE HEAD WITH AND WITHOUT CONTRAST  11/7/2024 12:05 pm TECHNIQUE: CT of the head/brain was performed without and with the administration of intravenous contrast. Multiplanar reformatted images are provided for review. Automated exposure control, iterative reconstruction, and/or weight based adjustment of the mA/kV was utilized to reduce the radiation dose to as low as reasonably achievable. COMPARISON: None. HISTORY: ORDERING SYSTEM PROVIDED HISTORY: Multiple falls TECHNOLOGIST PROVIDED HISTORY: Additional Contrast?->None STAT Creatinine as needed:->Yes Reason for exam:->falls, headaches, dizziness Reason for Exam: 3 days ago hit forehead on cabinet, slight dizzy since

## 2024-12-06 NOTE — CARE COORDINATION
CASE MANAGEMENT DISCHARGE SUMMARY    [facility]charge [facility]    Precertification completed: NA  Hospital Exemption Notification (HENS) completed: NA    IMM given: 12/5     Transportation:    Medical Transport explained to pt/family. Pt/family voice no agency preference.    Agency used: [facility]   time: 4pm   Ambulance form completed: Yes    Confirmed discharge plan with:   Patient: yes     Family:  yes, granddaughter bedside agrees with plan     Facility/Agency, name:  [facility] faxed, writer confirmed plan with Kay/admissions   Phone number for report to facility: [phone]     RN, name: Ptaricia    Note: Discharging nurse to complete CAR[facility], reconcile [facility], and place final copy with patient's discharge packet. RN to ensure that written prescriptions for  Level II medications are sent with patient to the facility as per protocol.  KIKI Santamaria-RN        1 day history of diarrhea + abdominal cramps. Now resolved. No recent ABx use.  - most likely viral gastroenteritis 2/2 enterovirus.  - monitor VS.  - Encourage PO hydration.  - holding home bowel regimen.  - Nauseous, given 4mg IV Zofran x 1 (QTc 450's); Heartburn, given TUMS 1 day history of diarrhea + abdominal cramps. Now resolved. No recent ABx use.  - most likely viral gastroenteritis 2/2 enterovirus.  - monitor VS.  - Encourage PO hydration.  - holding home bowel regimen.  - If nauseous, given TUMS; re-check EKG if need to give Zofran

## 2024-12-06 NOTE — PROGRESS NOTES
A&Ox4. Denied chest pain/heaviness, SOB/.   SR x 2.   Bed on lowest position. Bed alarm on.   With intact lap sites, no bleeding or discharges.   Call light within reach.

## 2024-12-06 NOTE — PROGRESS NOTES
Comprehensive Nutrition Assessment    Type and Reason for Visit:  Reassess    Nutrition Recommendations/Plan:   Continue low fat diet.   Continue Ensure BID to help promote healing.   Monitor pertinent labs, bowel habits, weight, N/V, clinical progression.       Malnutrition Assessment:  Malnutrition Status:  At risk for malnutrition (11/30/24 1023)    Context:  Acute Illness     Findings of the 6 clinical characteristics of malnutrition:  Energy Intake:  Mild decrease in energy intake  Weight Loss:  No weight loss     Body Fat Loss:  No body fat loss     Muscle Mass Loss:  No muscle mass loss    Fluid Accumulation:  Mild Generalized   Strength:  Not Performed    Nutrition Assessment:    Patient with adequate PO intakes of % throughout admission. Pt to discharge to LTC later today. No nutritional concerns at this time. Will continue to monitor.    Nutrition Related Findings:    -125, phos 1.9. +1 pitting edema RLE/LLE. Wound Type: Pressure Injury, Stage II       Current Nutrition Intake & Therapies:    Average Meal Intake: 51-75%, %  Average Supplements Intake: Unable to assess  ADULT DIET; Regular; Low Fat (less than or equal to 50 gm/day)  ADULT ORAL NUTRITION SUPPLEMENT; AM Snack, PM Snack; Standard High Calorie/High Protein Oral Supplement    Anthropometric Measures:  Height: 165.1 cm (5' 5\")  Ideal Body Weight (IBW): 125 lbs (57 kg)       Current Body Weight: 117.5 kg (259 lb), 207.2 % IBW. Weight Source: Bed scale  Current BMI (kg/m2): 43.1  Usual Body Weight:  (no weight loss noted in EMR)           BMI Categories: Obese Class 3 (BMI 40.0 or greater)    Estimated Daily Nutrient Needs:  Energy Requirements Based On: Kcal/kg (12-15)  Weight Used for Energy Requirements: Current  Energy (kcal/day): 2578-6965 kcal  Weight Used for Protein Requirements: Ideal (1.2-1.5 g/kg)  Protein (g/day): 68-86 g  Method Used for Fluid Requirements: 1 ml/kcal  Fluid (ml/day): 4251-3817 mL    Nutrition

## 2024-12-06 NOTE — CARE COORDINATION
[facility] FOUZIA updated writer, discharge today, he asked for 4pm transport.  Writer requested 4pm transport per [facility].  KARIME Santamaria

## 2024-12-06 NOTE — PROGRESS NOTES
[facility]    PATIENT NAME: Annie Singleton       POD # 4    TODAY'S DATE: 12/6/2024    CHIEF COMPLAINT: Mild abdominal pain    INTERVAL HISTORY/HPI:    Patient rested well overnight, denies nausea or vomiting. Tolerating diet.     REVIEW OF SYSTEMS:  Pertinent positives and negatives as per interval history section    OBJECTIVE:  VITALS:  /72   Pulse 76   Temp 98.1 °F (36.7 °C) (Oral)   Resp 18   Ht 1.651 m (5' 5\")   Wt 122.6 kg (270 lb 4.5 oz)   SpO2 96%   BMI 44.98 kg/m²     INTAKE/OUTPUT:    I/O last 3 completed shifts:  In: 450 [P.O.:440; I.V.:10]  Out: 1650 [Urine:1650]  I/O this shift:  In: 10 [I.V.:10]  Out: 400 [Urine:400]    CONSTITUTIONAL:  awake and alert  LUNGS:  Respirations easy and unlabored  CARD:  regular rate and rhythm  ABDOMEN:  normal bowel sounds, soft, non-distended, appropriate-tender, incisions clean and dry    Data:  CBC:   Recent Labs     12/04/24  0942 12/05/24  0506   WBC 9.8 7.1   HGB 11.8* 10.7*   HCT 35.9* 31.3*    236     BMP:    Recent Labs     12/04/24  0828 12/05/24  0506    138   K 4.6 4.3    103   CO2 26 28   BUN 13 14   CREATININE 0.8 0.7   GLUCOSE 94 101*     Hepatic:   No results for input(s): \"AST\", \"ALT\", \"BILITOT\", \"ALKPHOS\" in the last 72 hours.    Invalid input(s): \"ALB\"    Mag:      No results for input(s): \"MG\" in the last 72 hours.     Phos:   No results for input(s): \"PHOS\" in the last 72 hours.   INR:   No results for input(s): \"INR\" in the last 72 hours.        ASSESSMENT AND PLAN:  85 yo s/p robotic norman    Continue with diet as tolerated.   Continue with pain control.   Okay for discharge from a general surgery standpoint  Follow up in 2 weeks    Will not need further antibiotics once completed from gallbladder standpoint.     Morbid Obesity: BMI : 43.9 Complicating assessment and treatment. Placing patient at risk for multiple co-morbidities and complications.     Annette Strange DO, MS  PGY4, General

## 2024-12-06 NOTE — PLAN OF CARE
Problem: Discharge Planning  Goal: Discharge to home or other facility with appropriate resources  Outcome: Adequate for Discharge     Problem: Pain  Goal: Verbalizes/displays adequate comfort level or baseline comfort level  Outcome: Adequate for Discharge     Problem: Safety - Adult  Goal: Free from fall injury  Outcome: Adequate for Discharge     Problem: Skin/Tissue Integrity  Goal: Absence of new skin breakdown  Description: 1.  Monitor for areas of redness and/or skin breakdown  2.  Assess vascular access sites hourly  3.  Every 4-6 hours minimum:  Change oxygen saturation probe site  4.  Every 4-6 hours:  If on nasal continuous positive airway pressure, respiratory therapy assess nares and determine need for appliance change or resting period.  Outcome: Adequate for Discharge     Problem: Respiratory - Adult  Goal: Achieves optimal ventilation and oxygenation  Outcome: Adequate for Discharge     Problem: Cardiovascular - Adult  Goal: Absence of cardiac dysrhythmias or at baseline  Outcome: Adequate for Discharge     Problem: Skin/Tissue Integrity - Adult  Goal: Skin integrity remains intact  Outcome: Adequate for Discharge     Problem: Genitourinary - Adult  Goal: Absence of urinary retention  Outcome: Adequate for Discharge     Problem: Metabolic/Fluid and Electrolytes - Adult  Goal: Electrolytes maintained within normal limits  Outcome: Adequate for Discharge     Problem: Nutrition Deficit:  Goal: Optimize nutritional status  Outcome: Adequate for Discharge

## 2024-12-06 NOTE — PROGRESS NOTES
Shift assessment completed. Patient is A&O x4. VSS. Denies Pain. IV site patent flushed, and infusing. Patient on RA. 4-Lap sites C/D/I. Patient's last BM- was yesterday 2 large occurrences. Patient admits to passing gas. Patient ambulates with walker, X-1-Stand and pivot to bedside commode.  Medication given per MAR. Pt is eating breakfast at this time.               Safety Measures in place:   Denies any needs at this time.   Bed/ Chair alarm on for safety.   Bed locked and in lowest position.    Call light within reach.   Gripper socks applied.   Patient in stable condition when RN leaving room.

## 2024-12-06 NOTE — PROGRESS NOTES
4 Eyes Skin Assessment     The patient is being assess for  Shift Handoff    I agree that 2 RN's have performed a thorough Head to Toe Skin Assessment on the patient. ALL assessment sites listed below have been assessed.       Areas assessed by both nurses: MATHEW Sandy/MATHEW Saldana  [x]   Head, Face, and Ears   [x]   Shoulders, Back, and Chest  [x]   Arms, Elbows, and Hands   [x]   Coccyx, Sacrum, and IschIum  [x]   Legs, Feet, and Heels        Does the Patient have Skin Breakdown?  No         Ben Prevention initiated:  No   Wound Care Orders initiated:  No      WOC nurse consulted for Pressure Injury (Stage 3,4, Unstageable, DTI, NWPT, and Complex wounds), New and Established Ostomies:  No      Nurse 1 eSignature: Electronically signed by Raymundo Florence RN on 12/6/24 at 6:40 AM EST    **SHARE this note so that the co-signing nurse is able to place an eSignature**    Nurse 2 eSignature: {Esignature:[id number]}

## 2024-12-06 NOTE — DISCHARGE INSTR - COC
Continuity of Care Form    Patient Name: Annie Singleton   :  1940  MRN:  [id number]    Admit date:  2024  Discharge date:  24    Code Status Order: Limited   Advance Directives:   Advance Care Flowsheet Documentation        Date/Time Healthcare Directive Type of Healthcare Directive Copy in Chart Healthcare Agent Appointed Healthcare Agent's Name Healthcare Agent's Phone Number    24 1431 No, patient does not have an advance directive for healthcare treatment  --  --  --  --  --                     Admitting Physician:  Lissette Augustine MD  PCP: Zachary Grimm MD    Discharging Nurse: Patricia   [facility] Hospital Unit/Room#: 0334/0334-01  Discharging Unit Phone Number: [phone]     Emergency Contact:   Extended Emergency Contact Information  Primary Emergency Contact: Renae Singleton  Home Phone: [phone]  Relation: Child  Secondary Emergency Contact: Azucena Johnson  Home Phone: [phone]  Relation: Grandchild    Past Surgical History:  Past Surgical History:   Procedure Laterality Date    CHOLECYSTECTOMY, LAPAROSCOPIC N/A 2024    CHOLECYSTECTOMY LAPAROSCOPIC CHOLANGIOGRAM ROBOTIC XI performed by Zachary Escobar MD at [facility] OR    COLONOSCOPY      1994 OCT 12,2015 (diverticulosis)    HYSTERECTOMY (CERVIX STATUS UNKNOWN)      JOINT REPLACEMENT      PATRICIO TKR    KNEE SURGERY         Immunization History:   Immunization History   Administered Date(s) Administered    COVID-19, PFIZER Bivalent, DO NOT Dilute, (age 12y+), IM, 30 mcg/0.3 mL 10/02/2022    COVID-19, PFIZER PURPLE top, DILUTE for use, (age 12 y+), 30mcg/0.3mL 2021, 2021, 10/27/2021    DTaP 2012    Influenza Virus Vaccine 2017    Pneumococcal, PCV-13, PREVNAR 13, (age 6w+), IM, 0.5mL 2015    Pneumococcal, PPSV23, PNEUMOVAX 23, (age 2y+), SC/IM, 0.5mL 2006, 10/01/2012    TDaP, ADACEL (age 10y-64y), BOOSTRIX (age 10y+), IM, 0.5mL 2015       Active Problems:  Patient Active  0.6 cm^2 11/27/24 1031   Wound Volume (cm^3) 0.06 cm^3 11/27/24 1031   Distance Tunneling (cm) 0 cm 12/04/24 1138   Tunneling Position ___ O'Clock 0 12/04/24 1138   Undermining Starts ___ O'Clock 0 12/04/24 1138   Undermining Ends___ O'Clock 0 12/04/24 1138   Undermining Maxium Distance (cm) 0 12/04/24 1138   Wound Assessment Dry 12/04/24 1138   Drainage Amount None (dry) 12/06/24 0355   Drainage Description Serous 11/30/24 0839   Odor None 12/06/24 0355   Anai-wound Assessment Blanchable erythema 12/04/24 1138   Margins Unattached edges 11/30/24 0839   Wound Thickness Description not for Pressure Injury Partial thickness 11/30/24 0839   Number of days: 9       Wound 11/27/24 Groin Left (Active)   Wound Image   11/27/24 1031   Wound Etiology Other 12/04/24 1138   Dressing Status New dressing applied 12/04/24 1138   Wound Cleansed Other (Comment) 12/04/24 1138   Dressing/Treatment Interdry Ag/wicking fabric with Ag 12/06/24 0355   Offloading for Diabetic Foot Ulcers Offloading ordered 12/04/24 1138   Dressing Change Due 12/05/24 12/04/24 1138   Wound Length (cm) 0.5 cm 12/04/24 1138   Wound Width (cm) 8 cm 12/04/24 1138   Wound Depth (cm) 0.1 cm 12/04/24 1138   Wound Surface Area (cm^2) 4 cm^2 12/04/24 1138   Change in Wound Size % (l*w) -233.33 12/04/24 1138   Wound Volume (cm^3) 0.4 cm^3 12/04/24 1138   Wound Healing % -233 12/04/24 1138   Distance Tunneling (cm) 0 cm 12/04/24 1138   Tunneling Position ___ O'Clock 0 12/04/24 1138   Undermining Starts ___ O'Clock 0 12/04/24 1138   Undermining Ends___ O'Clock 0 12/04/24 1138   Undermining Maxium Distance (cm) 0 12/04/24 1138   Wound Assessment Pink/red 12/04/24 1138   Drainage Amount None (dry) 12/06/24 0355   Drainage Description Serous 11/30/24 1500   Odor None 12/04/24 1138   Anai-wound Assessment Dry/flaky 12/04/24 1138   Margins Unattached edges 12/04/24 1138   Wound Thickness Description not for Pressure Injury Partial thickness 12/04/24 1138   Number of days:

## 2024-12-06 NOTE — PROGRESS NOTES
Pt a/o x4. VSS. All prescriptions, discharge and follow up instructions given to the patient.  The patient verbalizes understanding and denies questions.  All belongings collected and sent with the patient. The patient was discharged off the unit by stretcher and transportation in stable condition.

## 2024-12-12 ENCOUNTER — TELEPHONE (OUTPATIENT)
Dept: CARDIOLOGY CLINIC | Age: 84
End: 2024-12-12

## 2024-12-12 NOTE — TELEPHONE ENCOUNTER
Peyton from [facility] stated pt and family does not want any fluid restrictions, they need to know if they need restrictions and what are they? Call back to [phone]

## 2024-12-12 NOTE — TELEPHONE ENCOUNTER
Attempted to reach Xi from [facility]. If xi returns call please ask if there is any changes in pts plan of care or code status.

## 2024-12-12 NOTE — TELEPHONE ENCOUNTER
Bartolome Garsia MD  [facility] Card[facility] Staff; Enedina Botello, RN2 hours ago (1:29 PM)         That is fine, monitor for LE swelling and keep office FU visit as scheduled.       Spoke with nurse at [facility]. Nurses v/u

## 2024-12-12 NOTE — TELEPHONE ENCOUNTER
Spoke with Peyton at [facility] there is no changes to plan of care or code status. Family has requested pt not be on fluid restrictions. Peyton also advised family said pt will be holding off on getting the watchman.  Per telephone encoutner from 11/08/24: Patient should be on 2 liter fluid restriction     AMBER please advise pt and family does not want any fluid restrictions, they need to know if they need restrictions and what are they

## 2024-12-16 ENCOUNTER — OFFICE VISIT (OUTPATIENT)
Dept: SURGERY | Age: 84
End: 2024-12-16

## 2024-12-16 VITALS
DIASTOLIC BLOOD PRESSURE: 94 MMHG | SYSTOLIC BLOOD PRESSURE: 165 MMHG | HEART RATE: 90 BPM | HEIGHT: 65 IN | WEIGHT: 260 LBS | BODY MASS INDEX: 43.32 KG/M2

## 2024-12-16 DIAGNOSIS — Z09 POSTOP CHECK: ICD-10-CM

## 2024-12-16 DIAGNOSIS — Z90.49 STATUS POST CHOLECYSTECTOMY: Primary | ICD-10-CM

## 2024-12-16 PROBLEM — Z48.89 ENCOUNTER FOR POSTOPERATIVE CARE: Status: ACTIVE | Noted: 2024-12-16

## 2024-12-16 PROCEDURE — 99024 POSTOP FOLLOW-UP VISIT: CPT | Performed by: SURGERY

## 2024-12-16 NOTE — PROGRESS NOTES
stooling/urination difficulties or abnormalities. Denies diarrhea and constipation but admits to a sensation of bloating that is relieved by gas-x as well as 6 lb weight loss since procedure. Incision site is CDI and overall patient is recovering well after surgery.  Pt was counseled on importance of avoiding foods that trigger abdominal bloating and continued utilization of gas-x as needed to relieve abdominal bloating symptoms. Pt verbalized understanding.     Michael Mendes MD  PGY-1, Family Medicine  [facility] Residency Program    Electronically signed by Michael Mendes MD      Patient seen and agree with above and more than half of the total time was spent by me on the encounter.     Alejandro Escobar MD

## 2025-01-15 PROBLEM — Z09 POSTOP CHECK: Status: RESOLVED | Noted: 2024-12-16 | Resolved: 2025-01-15

## 2025-07-07 ENCOUNTER — APPOINTMENT (OUTPATIENT)
Dept: GENERAL RADIOLOGY | Age: 85
End: 2025-07-07
Payer: COMMERCIAL

## 2025-07-07 ENCOUNTER — HOSPITAL ENCOUNTER (EMERGENCY)
Age: 85
Discharge: HOME | End: 2025-07-08
Payer: COMMERCIAL

## 2025-07-07 DIAGNOSIS — S91.115A LACERATION OF SECOND TOE OF LEFT FOOT, INITIAL ENCOUNTER: ICD-10-CM

## 2025-07-07 DIAGNOSIS — S92.512B OPEN DISPLACED FRACTURE OF PROXIMAL PHALANX OF LESSER TOE OF LEFT FOOT, INITIAL ENCOUNTER: Primary | ICD-10-CM

## 2025-07-07 PROCEDURE — 6370000000 HC RX 637 (ALT 250 FOR IP): Performed by: PHYSICIAN ASSISTANT

## 2025-07-07 PROCEDURE — 73630 X-RAY EXAM OF FOOT: CPT

## 2025-07-07 PROCEDURE — 12002 RPR S/N/AX/GEN/TRNK2.6-7.5CM: CPT

## 2025-07-07 PROCEDURE — 99283 EMERGENCY DEPT VISIT LOW MDM: CPT

## 2025-07-07 RX ADMIN — CEPHALEXIN 500 MG: 250 CAPSULE ORAL at 23:54

## 2025-07-07 ASSESSMENT — PAIN SCALES - GENERAL: PAINLEVEL_OUTOF10: 4

## 2025-07-07 ASSESSMENT — LIFESTYLE VARIABLES
HOW MANY STANDARD DRINKS CONTAINING ALCOHOL DO YOU HAVE ON A TYPICAL DAY: PATIENT DOES NOT DRINK
HOW OFTEN DO YOU HAVE A DRINK CONTAINING ALCOHOL: NEVER

## 2025-07-07 ASSESSMENT — PAIN - FUNCTIONAL ASSESSMENT: PAIN_FUNCTIONAL_ASSESSMENT: 0-10

## 2025-07-07 ASSESSMENT — PAIN DESCRIPTION - LOCATION: LOCATION: FOOT

## 2025-07-08 ENCOUNTER — TELEPHONE (OUTPATIENT)
Dept: ORTHOPEDIC SURGERY | Age: 85
End: 2025-07-08

## 2025-07-08 VITALS
SYSTOLIC BLOOD PRESSURE: 110 MMHG | DIASTOLIC BLOOD PRESSURE: 62 MMHG | HEART RATE: 82 BPM | RESPIRATION RATE: 17 BRPM | OXYGEN SATURATION: 91 % | TEMPERATURE: 98.5 F

## 2025-07-08 RX ORDER — CEPHALEXIN 500 MG/1
500 CAPSULE ORAL 3 TIMES DAILY
Qty: 21 CAPSULE | Refills: 0 | Status: SHIPPED | OUTPATIENT
Start: 2025-07-08 | End: 2025-07-15

## 2025-07-08 NOTE — ED PROVIDER NOTES
HPI    PHYSICAL EXAM:  /62   Pulse 82   Temp 98.5 °F (36.9 °C)   Resp 17   SpO2 91%   CONSTITUTIONAL: Awake and alert. Well-developed. Well-nourished. Non-toxic. Cooperative. No acute distress.  HENT: Normocephalic. Atraumatic. External ears normal, without discharge. Nose normal. Mucous membranes moist.  EYES: Conjunctiva non-injected. No scleral icterus. PERRL. EOM's grossly intact.    NECK: Supple. Normal ROM.   CARDIOVASCULAR: Normal heart rate. Intact distal pulses.  PULMONARY/CHEST WALL: Breathing is unlabored. Equal, symmetric chest rise. Speaking comfortably in full sentences.   ABDOMEN: Nondistended  MUSKULOSKELETAL: Left foot: Significant swelling and bruising to the 2nd and 3rd toes.  3 separate lacerations to the medial aspect of the toe.  The most proximal wound is 1.5 cm in length.  The middle wound is also 1.5 cm in length and the most distal wound is 1 cm in length.  Patient has mild tenderness with this.  Maintains normal ROM.  See pictures below.  SKIN: Warm and dry.    NEUROLOGICAL: Alert and oriented x 3. Strength is 5/5 in all extremities and sensation is intact.  PSYCHIATRIC: Normal affect      Left foot, second toe lacs                    DIAGNOSTIC STUDIES:    LABS:    None    RADIOLOGY:    All x-ray studies are viewed/reviewed by me. My interpretation: X-ray left foot: Fracture at the base of the second phalanx.  Formal interpretations per the radiologist are as follows:      XR FOOT LEFT (MIN 3 VIEWS)  Result Date: 7/7/2025  Clinical history: Laceration to the second toe. Injury. Comparisons: None. FINDINGS: 3 projections of the left foot. Diffuse midfoot and forefoot soft tissue swelling. Plantar calcaneal spur. Nondisplaced fracture of the base of the proximal phalanx of the left second toe along its medial aspect with intra-articular extension into the second MTP joint. Severe bunion deformity at the first MTP joint.     1. Nondisplaced fracture at the base of the second toe

## 2025-07-08 NOTE — TELEPHONE ENCOUNTER
Medical Facility Question     Facility Name: Nursing facility   Contact Name: Jeannelulu   Contact Number: [phone]  Request or Information: Wanting to know if patient can be seen sooner at the [facility] office.

## 2025-07-08 NOTE — TELEPHONE ENCOUNTER
Called nursing home back to inform them that Dr. Iqbal only works a [facility] on Mondays and we are overbooked next Monday and 07/21 is now completely booked.

## 2025-07-17 ENCOUNTER — OFFICE VISIT (OUTPATIENT)
Dept: ORTHOPEDIC SURGERY | Age: 85
End: 2025-07-17
Payer: COMMERCIAL

## 2025-07-17 VITALS — HEIGHT: 65 IN | OXYGEN SATURATION: 95 % | BODY MASS INDEX: 43.32 KG/M2 | WEIGHT: 260 LBS | HEART RATE: 80 BPM

## 2025-07-17 DIAGNOSIS — S91.312A LACERATION OF LEFT FOOT, INITIAL ENCOUNTER: ICD-10-CM

## 2025-07-17 DIAGNOSIS — S92.512A CLOSED DISPLACED FRACTURE OF PROXIMAL PHALANX OF LESSER TOE OF LEFT FOOT, INITIAL ENCOUNTER: ICD-10-CM

## 2025-07-17 DIAGNOSIS — M79.672 LEFT FOOT PAIN: Primary | ICD-10-CM

## 2025-07-17 PROCEDURE — 1090F PRES/ABSN URINE INCON ASSESS: CPT | Performed by: ORTHOPAEDIC SURGERY

## 2025-07-17 PROCEDURE — G8428 CUR MEDS NOT DOCUMENT: HCPCS | Performed by: ORTHOPAEDIC SURGERY

## 2025-07-17 PROCEDURE — 99203 OFFICE O/P NEW LOW 30 MIN: CPT | Performed by: ORTHOPAEDIC SURGERY

## 2025-07-17 PROCEDURE — 1123F ACP DISCUSS/DSCN MKR DOCD: CPT | Performed by: ORTHOPAEDIC SURGERY

## 2025-07-17 PROCEDURE — 1036F TOBACCO NON-USER: CPT | Performed by: ORTHOPAEDIC SURGERY

## 2025-07-17 PROCEDURE — G8400 PT W/DXA NO RESULTS DOC: HCPCS | Performed by: ORTHOPAEDIC SURGERY

## 2025-07-17 PROCEDURE — G8417 CALC BMI ABV UP PARAM F/U: HCPCS | Performed by: ORTHOPAEDIC SURGERY
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
## 2025-07-18 ENCOUNTER — HOSPITAL ENCOUNTER (INPATIENT)
Age: 85
LOS: 5 days | Discharge: NURSING, REHAB OR LONG-TERM CARE | DRG: 603 | End: 2025-07-23
Attending: STUDENT IN AN ORGANIZED HEALTH CARE EDUCATION/TRAINING PROGRAM | Admitting: HOSPITALIST
Payer: COMMERCIAL

## 2025-07-18 ENCOUNTER — APPOINTMENT (OUTPATIENT)
Dept: GENERAL RADIOLOGY | Age: 85
DRG: 603 | End: 2025-07-18
Payer: COMMERCIAL

## 2025-07-18 ENCOUNTER — APPOINTMENT (OUTPATIENT)
Dept: VASCULAR LAB | Age: 85
DRG: 603 | End: 2025-07-18
Attending: HOSPITALIST
Payer: COMMERCIAL

## 2025-07-18 DIAGNOSIS — R53.1 GENERALIZED WEAKNESS: Primary | ICD-10-CM

## 2025-07-18 DIAGNOSIS — A41.9 SEPTICEMIA (HCC): ICD-10-CM

## 2025-07-18 DIAGNOSIS — L03.116 CELLULITIS OF LEFT LOWER EXTREMITY: ICD-10-CM

## 2025-07-18 DIAGNOSIS — N17.9 AKI (ACUTE KIDNEY INJURY): ICD-10-CM

## 2025-07-18 PROBLEM — L03.115 CELLULITIS OF RIGHT LEG: Status: ACTIVE | Noted: 2025-07-18

## 2025-07-18 PROBLEM — L03.90 CELLULITIS: Status: ACTIVE | Noted: 2025-07-18

## 2025-07-18 LAB
ALBUMIN SERPL-MCNC: 3.5 G/DL (ref 3.4–5)
ALBUMIN/GLOB SERPL: 1.2 {RATIO} (ref 1.1–2.2)
ALP SERPL-CCNC: 138 U/L (ref 40–129)
ALT SERPL-CCNC: 11 U/L (ref 10–40)
ANION GAP SERPL CALCULATED.3IONS-SCNC: 14 MMOL/L (ref 3–16)
AST SERPL-CCNC: 22 U/L (ref 15–37)
BASOPHILS # BLD: 0.1 K/UL (ref 0–0.2)
BASOPHILS NFR BLD: 0.3 %
BILIRUB SERPL-MCNC: 2.4 MG/DL (ref 0–1)
BUN SERPL-MCNC: 42 MG/DL (ref 7–20)
CALCIUM SERPL-MCNC: 8.9 MG/DL (ref 8.3–10.6)
CHLORIDE SERPL-SCNC: 78 MMOL/L (ref 99–110)
CO2 SERPL-SCNC: 40 MMOL/L (ref 21–32)
CREAT SERPL-MCNC: 1.4 MG/DL (ref 0.6–1.2)
CRP SERPL-MCNC: 54.7 MG/L (ref 0–5.1)
DEPRECATED RDW RBC AUTO: 14 % (ref 12.4–15.4)
ECHO BSA: 2.2 M2
EOSINOPHIL # BLD: 0 K/UL (ref 0–0.6)
EOSINOPHIL NFR BLD: 0.1 %
ERYTHROCYTE [SEDIMENTATION RATE] IN BLOOD BY WESTERGREN METHOD: 51 MM/HR (ref 0–30)
GFR SERPLBLD CREATININE-BSD FMLA CKD-EPI: 37 ML/MIN/{1.73_M2}
GLUCOSE SERPL-MCNC: 142 MG/DL (ref 70–99)
HCT VFR BLD AUTO: 36.1 % (ref 36–48)
HGB BLD-MCNC: 12.6 G/DL (ref 12–16)
LACTATE BLDV-SCNC: 1.8 MMOL/L (ref 0.4–1.9)
LIPASE SERPL-CCNC: 12 U/L (ref 13–60)
LYMPHOCYTES # BLD: 0.6 K/UL (ref 1–5.1)
LYMPHOCYTES NFR BLD: 2.7 %
MAGNESIUM SERPL-MCNC: 2.06 MG/DL (ref 1.8–2.4)
MCH RBC QN AUTO: 32.6 PG (ref 26–34)
MCHC RBC AUTO-ENTMCNC: 34.8 G/DL (ref 31–36)
MCV RBC AUTO: 93.7 FL (ref 80–100)
MONOCYTES # BLD: 0.7 K/UL (ref 0–1.3)
MONOCYTES NFR BLD: 3.3 %
NEUTROPHILS # BLD: 21.3 K/UL (ref 1.7–7.7)
NEUTROPHILS NFR BLD: 93.6 %
PLATELET # BLD AUTO: 396 K/UL (ref 135–450)
PMV BLD AUTO: 7.9 FL (ref 5–10.5)
POTASSIUM SERPL-SCNC: 3.1 MMOL/L (ref 3.5–5.1)
PROCALCITONIN SERPL IA-MCNC: 0.12 NG/ML (ref 0–0.15)
PROT SERPL-MCNC: 6.4 G/DL (ref 6.4–8.2)
RBC # BLD AUTO: 3.86 M/UL (ref 4–5.2)
SODIUM SERPL-SCNC: 132 MMOL/L (ref 136–145)
WBC # BLD AUTO: 22.7 K/UL (ref 4–11)

## 2025-07-18 PROCEDURE — 6370000000 HC RX 637 (ALT 250 FOR IP): Performed by: HOSPITALIST

## 2025-07-18 PROCEDURE — 83690 ASSAY OF LIPASE: CPT

## 2025-07-18 PROCEDURE — 93970 EXTREMITY STUDY: CPT

## 2025-07-18 PROCEDURE — 84145 PROCALCITONIN (PCT): CPT

## 2025-07-18 PROCEDURE — 71045 X-RAY EXAM CHEST 1 VIEW: CPT

## 2025-07-18 PROCEDURE — 86140 C-REACTIVE PROTEIN: CPT

## 2025-07-18 PROCEDURE — 85652 RBC SED RATE AUTOMATED: CPT

## 2025-07-18 PROCEDURE — 2500000003 HC RX 250 WO HCPCS: Performed by: INTERNAL MEDICINE

## 2025-07-18 PROCEDURE — 80053 COMPREHEN METABOLIC PANEL: CPT

## 2025-07-18 PROCEDURE — 85025 COMPLETE CBC W/AUTO DIFF WBC: CPT

## 2025-07-18 PROCEDURE — 94761 N-INVAS EAR/PLS OXIMETRY MLT: CPT

## 2025-07-18 PROCEDURE — 99222 1ST HOSP IP/OBS MODERATE 55: CPT | Performed by: INTERNAL MEDICINE

## 2025-07-18 PROCEDURE — 83735 ASSAY OF MAGNESIUM: CPT

## 2025-07-18 PROCEDURE — 2700000000 HC OXYGEN THERAPY PER DAY

## 2025-07-18 PROCEDURE — 99285 EMERGENCY DEPT VISIT HI MDM: CPT

## 2025-07-18 PROCEDURE — 6360000002 HC RX W HCPCS: Performed by: INTERNAL MEDICINE

## 2025-07-18 PROCEDURE — 93970 EXTREMITY STUDY: CPT | Performed by: SURGERY

## 2025-07-18 PROCEDURE — 83605 ASSAY OF LACTIC ACID: CPT

## 2025-07-18 PROCEDURE — 2580000003 HC RX 258: Performed by: HOSPITALIST

## 2025-07-18 PROCEDURE — 96374 THER/PROPH/DIAG INJ IV PUSH: CPT

## 2025-07-18 PROCEDURE — 6360000002 HC RX W HCPCS: Performed by: STUDENT IN AN ORGANIZED HEALTH CARE EDUCATION/TRAINING PROGRAM

## 2025-07-18 PROCEDURE — 1200000000 HC SEMI PRIVATE

## 2025-07-18 PROCEDURE — 87040 BLOOD CULTURE FOR BACTERIA: CPT

## 2025-07-18 PROCEDURE — 6360000002 HC RX W HCPCS: Performed by: HOSPITALIST

## 2025-07-18 PROCEDURE — 2580000003 HC RX 258: Performed by: STUDENT IN AN ORGANIZED HEALTH CARE EDUCATION/TRAINING PROGRAM

## 2025-07-18 RX ORDER — PANTOPRAZOLE SODIUM 40 MG/1
40 TABLET, DELAYED RELEASE ORAL DAILY
Status: DISCONTINUED | OUTPATIENT
Start: 2025-07-18 | End: 2025-07-23 | Stop reason: HOSPADM

## 2025-07-18 RX ORDER — ONDANSETRON 2 MG/ML
4 INJECTION INTRAMUSCULAR; INTRAVENOUS EVERY 6 HOURS PRN
Status: DISCONTINUED | OUTPATIENT
Start: 2025-07-18 | End: 2025-07-23 | Stop reason: HOSPADM

## 2025-07-18 RX ORDER — MECOBALAMIN 5000 MCG
5 TABLET,DISINTEGRATING ORAL AS NEEDED
Status: DISCONTINUED | OUTPATIENT
Start: 2025-07-18 | End: 2025-07-23 | Stop reason: HOSPADM

## 2025-07-18 RX ORDER — FLUTICASONE PROPIONATE 110 UG/1
1 AEROSOL, METERED RESPIRATORY (INHALATION) DAILY PRN
Status: DISCONTINUED | OUTPATIENT
Start: 2025-07-18 | End: 2025-07-23 | Stop reason: HOSPADM

## 2025-07-18 RX ORDER — BUMETANIDE 1 MG/1
2 TABLET ORAL DAILY
Status: DISCONTINUED | OUTPATIENT
Start: 2025-07-18 | End: 2025-07-23 | Stop reason: HOSPADM

## 2025-07-18 RX ORDER — ONDANSETRON 4 MG/1
4 TABLET, ORALLY DISINTEGRATING ORAL EVERY 8 HOURS PRN
Status: DISCONTINUED | OUTPATIENT
Start: 2025-07-18 | End: 2025-07-23 | Stop reason: HOSPADM

## 2025-07-18 RX ORDER — SODIUM CHLORIDE 9 MG/ML
INJECTION, SOLUTION INTRAVENOUS PRN
Status: DISCONTINUED | OUTPATIENT
Start: 2025-07-18 | End: 2025-07-23 | Stop reason: HOSPADM

## 2025-07-18 RX ORDER — ATORVASTATIN CALCIUM 40 MG/1
40 TABLET, FILM COATED ORAL NIGHTLY
Status: DISCONTINUED | OUTPATIENT
Start: 2025-07-18 | End: 2025-07-23 | Stop reason: HOSPADM

## 2025-07-18 RX ORDER — HEPARIN SODIUM 5000 [USP'U]/ML
5000 INJECTION, SOLUTION INTRAVENOUS; SUBCUTANEOUS EVERY 8 HOURS SCHEDULED
Status: DISCONTINUED | OUTPATIENT
Start: 2025-07-18 | End: 2025-07-23 | Stop reason: HOSPADM

## 2025-07-18 RX ORDER — POTASSIUM CHLORIDE 7.45 MG/ML
10 INJECTION INTRAVENOUS PRN
Status: DISCONTINUED | OUTPATIENT
Start: 2025-07-18 | End: 2025-07-23 | Stop reason: HOSPADM

## 2025-07-18 RX ORDER — SODIUM CHLORIDE, SODIUM LACTATE, POTASSIUM CHLORIDE, CALCIUM CHLORIDE 600; 310; 30; 20 MG/100ML; MG/100ML; MG/100ML; MG/100ML
INJECTION, SOLUTION INTRAVENOUS CONTINUOUS
Status: ACTIVE | OUTPATIENT
Start: 2025-07-18 | End: 2025-07-18

## 2025-07-18 RX ORDER — ASPIRIN 81 MG/1
81 TABLET, CHEWABLE ORAL DAILY
Status: DISCONTINUED | OUTPATIENT
Start: 2025-07-18 | End: 2025-07-23 | Stop reason: HOSPADM

## 2025-07-18 RX ORDER — MECLIZINE HCL 12.5 MG 12.5 MG/1
25 TABLET ORAL 3 TIMES DAILY PRN
Status: DISCONTINUED | OUTPATIENT
Start: 2025-07-18 | End: 2025-07-23 | Stop reason: HOSPADM

## 2025-07-18 RX ORDER — LEVOFLOXACIN 5 MG/ML
500 INJECTION, SOLUTION INTRAVENOUS ONCE
Status: COMPLETED | OUTPATIENT
Start: 2025-07-18 | End: 2025-07-18

## 2025-07-18 RX ORDER — SODIUM CHLORIDE 0.9 % (FLUSH) 0.9 %
5-40 SYRINGE (ML) INJECTION EVERY 12 HOURS SCHEDULED
Status: DISCONTINUED | OUTPATIENT
Start: 2025-07-18 | End: 2025-07-23 | Stop reason: HOSPADM

## 2025-07-18 RX ORDER — ACETAMINOPHEN 650 MG/1
650 SUPPOSITORY RECTAL EVERY 6 HOURS PRN
Status: DISCONTINUED | OUTPATIENT
Start: 2025-07-18 | End: 2025-07-23 | Stop reason: HOSPADM

## 2025-07-18 RX ORDER — SODIUM CHLORIDE 0.9 % (FLUSH) 0.9 %
5-40 SYRINGE (ML) INJECTION PRN
Status: DISCONTINUED | OUTPATIENT
Start: 2025-07-18 | End: 2025-07-23 | Stop reason: HOSPADM

## 2025-07-18 RX ORDER — ACETAMINOPHEN 325 MG/1
650 TABLET ORAL EVERY 6 HOURS PRN
Status: DISCONTINUED | OUTPATIENT
Start: 2025-07-18 | End: 2025-07-18 | Stop reason: SDUPTHER

## 2025-07-18 RX ORDER — GABAPENTIN 100 MG/1
100 CAPSULE ORAL 3 TIMES DAILY
Status: DISCONTINUED | OUTPATIENT
Start: 2025-07-18 | End: 2025-07-20

## 2025-07-18 RX ORDER — MAGNESIUM SULFATE IN WATER 40 MG/ML
2000 INJECTION, SOLUTION INTRAVENOUS PRN
Status: DISCONTINUED | OUTPATIENT
Start: 2025-07-18 | End: 2025-07-23 | Stop reason: HOSPADM

## 2025-07-18 RX ORDER — CITALOPRAM HYDROBROMIDE 20 MG/1
40 TABLET ORAL DAILY
Status: DISCONTINUED | OUTPATIENT
Start: 2025-07-18 | End: 2025-07-23 | Stop reason: HOSPADM

## 2025-07-18 RX ORDER — POTASSIUM CHLORIDE 1500 MG/1
40 TABLET, EXTENDED RELEASE ORAL
Status: DISCONTINUED | OUTPATIENT
Start: 2025-07-18 | End: 2025-07-23 | Stop reason: HOSPADM

## 2025-07-18 RX ORDER — ALBUTEROL SULFATE 90 UG/1
2 INHALANT RESPIRATORY (INHALATION) EVERY 6 HOURS PRN
Status: DISCONTINUED | OUTPATIENT
Start: 2025-07-18 | End: 2025-07-23 | Stop reason: HOSPADM

## 2025-07-18 RX ORDER — ACETAMINOPHEN 325 MG/1
650 TABLET ORAL EVERY 6 HOURS PRN
Status: DISCONTINUED | OUTPATIENT
Start: 2025-07-18 | End: 2025-07-23 | Stop reason: HOSPADM

## 2025-07-18 RX ORDER — POLYETHYLENE GLYCOL 3350 17 G/17G
17 POWDER, FOR SOLUTION ORAL DAILY
Status: DISCONTINUED | OUTPATIENT
Start: 2025-07-18 | End: 2025-07-23 | Stop reason: HOSPADM

## 2025-07-18 RX ORDER — POLYETHYLENE GLYCOL 3350 17 G/17G
17 POWDER, FOR SOLUTION ORAL DAILY PRN
Status: DISCONTINUED | OUTPATIENT
Start: 2025-07-18 | End: 2025-07-23 | Stop reason: HOSPADM

## 2025-07-18 RX ORDER — POTASSIUM CHLORIDE 1500 MG/1
40 TABLET, EXTENDED RELEASE ORAL PRN
Status: DISCONTINUED | OUTPATIENT
Start: 2025-07-18 | End: 2025-07-23 | Stop reason: HOSPADM

## 2025-07-18 RX ADMIN — POTASSIUM BICARBONATE 40 MEQ: 782 TABLET, EFFERVESCENT ORAL at 20:13

## 2025-07-18 RX ADMIN — VANCOMYCIN HYDROCHLORIDE 1000 MG: 1 INJECTION, POWDER, LYOPHILIZED, FOR SOLUTION INTRAVENOUS at 08:26

## 2025-07-18 RX ADMIN — SODIUM CHLORIDE, SODIUM LACTATE, POTASSIUM CHLORIDE, AND CALCIUM CHLORIDE: .6; .31; .03; .02 INJECTION, SOLUTION INTRAVENOUS at 06:46

## 2025-07-18 RX ADMIN — BUMETANIDE 2 MG: 1 TABLET ORAL at 16:53

## 2025-07-18 RX ADMIN — PANTOPRAZOLE SODIUM 40 MG: 40 TABLET, DELAYED RELEASE ORAL at 08:13

## 2025-07-18 RX ADMIN — WATER 2000 MG: 1 INJECTION INTRAMUSCULAR; INTRAVENOUS; SUBCUTANEOUS at 16:53

## 2025-07-18 RX ADMIN — GABAPENTIN 100 MG: 100 CAPSULE ORAL at 16:53

## 2025-07-18 RX ADMIN — CITALOPRAM HYDROBROMIDE 40 MG: 20 TABLET ORAL at 16:53

## 2025-07-18 RX ADMIN — CEFEPIME 1000 MG: 1 INJECTION, POWDER, FOR SOLUTION INTRAMUSCULAR; INTRAVENOUS at 04:37

## 2025-07-18 RX ADMIN — LEVOFLOXACIN 500 MG: 500 INJECTION, SOLUTION INTRAVENOUS at 06:47

## 2025-07-18 RX ADMIN — GABAPENTIN 100 MG: 100 CAPSULE ORAL at 08:13

## 2025-07-18 RX ADMIN — ATORVASTATIN CALCIUM 40 MG: 40 TABLET, FILM COATED ORAL at 20:13

## 2025-07-18 RX ADMIN — HEPARIN SODIUM 5000 UNITS: 5000 INJECTION INTRAVENOUS; SUBCUTANEOUS at 08:13

## 2025-07-18 RX ADMIN — GABAPENTIN 100 MG: 100 CAPSULE ORAL at 20:13

## 2025-07-18 RX ADMIN — MICONAZOLE NITRATE: 2 POWDER TOPICAL at 18:49

## 2025-07-18 RX ADMIN — ASPIRIN 81 MG: 81 TABLET, CHEWABLE ORAL at 08:13

## 2025-07-18 RX ADMIN — ACETAMINOPHEN 650 MG: 325 TABLET ORAL at 06:10

## 2025-07-18 ASSESSMENT — PAIN SCALES - GENERAL
PAINLEVEL_OUTOF10: 3
PAINLEVEL_OUTOF10: 4
PAINLEVEL_OUTOF10: 5
PAINLEVEL_OUTOF10: 6
PAINLEVEL_OUTOF10: 7

## 2025-07-18 ASSESSMENT — PAIN DESCRIPTION - LOCATION
LOCATION: HEAD
LOCATION: ARM

## 2025-07-18 ASSESSMENT — PAIN DESCRIPTION - DESCRIPTORS
DESCRIPTORS: ACHING

## 2025-07-18 ASSESSMENT — PAIN - FUNCTIONAL ASSESSMENT: PAIN_FUNCTIONAL_ASSESSMENT: 0-10

## 2025-07-18 NOTE — ED PROVIDER NOTES
Emergency Department Provider Note  Location: [facility] EMERGENCY DEPARTMENT  7/18/2025     Patient Identification  Annie Singleton is a 84 y.o. female    Chief Complaint  abnormal lab  (From [facility] Emergency Department with abnormal lab K 2.7 from 7/16 and Generalized weakness. )      Mode of Arrival  EMS    HPI  (History provided by patient)  This is a 84 y.o. female with a PMH significant for hypertension, atrial fibrillation presented today for generalized weakness and abnormal lab.  Patient presents from [facility].  She was found to have a lab with a potassium of 2.7.  She is endorsing generalized weakness.  Patient reports that she has been feeling generally weak for the last couple of days.  She reports some nausea and 1 episode of vomiting after she choked on some water.  At baseline she wears 1 L of supplemental O2.  She denies any significant worsening shortness of breath.  She is endorsing a productive cough.  She denies any significant abdominal pain.  She denies any blood in her stool.  She has no associated chest pain or fever.  Of note she is currently being treated for left lower extremity cellulitis and has been on oral doxycycline.  States that her left leg continues to be swollen and painful.    ROS  Review of Systems   Constitutional:  Positive for fatigue.   Respiratory:  Positive for cough.    Gastrointestinal:  Positive for nausea and vomiting.   All other systems reviewed and are negative.        I have reviewed the following nursing documentation:  Allergies:   Allergies   Allergen Reactions    Adhesive Tape Other (See Comments)     Severe skin irritation, blisters    Erythromycin Itching    Pcn [Penicillins] Rash       Past medical history:  has a past medical history of Arrhythmia, Arthritis, Asthma, Atrial fibrillation (HCC), Diverticula of colon (10/12/2015), GERD (gastroesophageal reflux disease), Hypertension, and IBS (irritable bowel syndrome) (2010).    Past surgical history:  has a  rhythm.      Pulses: Normal pulses.      Heart sounds: Normal heart sounds.   Pulmonary:      Effort: Pulmonary effort is normal.   Abdominal:      General: There is no distension.      Palpations: Abdomen is soft.      Tenderness: There is no abdominal tenderness.   Musculoskeletal:      Cervical back: Neck supple.      Comments: Erythema to the left lower extremity.  There is bilateral 2+ edema.  Neurovascularly intact.   Skin:     General: Skin is warm.      Capillary Refill: Capillary refill takes less than 2 seconds.   Neurological:      General: No focal deficit present.      Mental Status: She is alert. Mental status is at baseline.   Psychiatric:         Mood and Affect: Mood normal.           MDM/ED Course  ED Medication Orders (From admission, onward)      Start Ordered     Status Ordering Provider    07/18/25 0430 07/18/25 0418  cefepime (MAXIPIME) 1,000 mg in sodium chloride 0.9 % 50 mL IVPB (addEASE)  Once        Question:  Antimicrobial Indications  Answer:  Skin and Soft Tissue Infection    Acknowledged MEMO SALAS                Radiology  XR CHEST PORTABLE  Result Date: 7/18/2025  PORTABLE CHEST. Comparison Study: 11/25/2024 HISTORY: Short of breath, possible aspiration FINDINGS: Patient is rotated in this exam. Heart size appears within normal limits. Mild prominence of lung markings with mild bibasilar airspace disease, atelectasis versus infiltrate. No pleural effusion. Bony structures are intact.     1. Mild bibasilar airspace disease left greater than right, atelectasis versus infiltrate. Correlate clinically. Electronically signed by Landon Harrison MD    XR FOOT LEFT (MIN 3 VIEWS)  Result Date: 7/17/2025  Radiology exam is complete. No Radiologist dictation. Please follow up with ordering provider.         Labs  Results for orders placed or performed during the hospital encounter of 07/18/25   CBC with Auto Differential   Result Value Ref Range    WBC 22.7 (H) 4.0 - 11.0 K/uL    RBC  history and exam I suspect generalized weakness secondary to potassium abnormality.  I did consider underlying renal dysfunction and anemia.  Of note she is also currently on doxycycline for cellulitis.  Presentation concerning for possible worsening infection.  She has no chest pain or shortness of breath concerning for ACS pneumonia or acute cardiopulmonary change.  I obtained labs as noted below  - Patient was placed on telemetry during her ED stay and no malignant dysrhythmia observed.  - Pertinent old records reviewed.   - Chronic medical conditions reviewed  - Social determinants reviewed  - Patient was given 1g IV cefepime, IV vanc,  in the ED.  Upon reassessment, patient remained stable.    - Diagnostic studies reviewed.   - CXR mild bibasilar airspace disease left > right    - Lab:   CBC with leukocytosis 22.7, no evidence of anemia, normal platelets  CMP with hyponatermia 132, low K 3.1, COLLINS with BUN 42, Cr 1.4, GFR 37 consistent with COLLINS, hyperglycemic 142, elevated alk phos 138, elevated bilirubin 2.4  Normal lactic acid  Elevated CRP 54.7  Elevated ESR 51    SEP-1 CORE MEASURE DATA      Sepsis Criteria   Severe Sepsis Criteria   Septic Shock Criteria       Must meet 2:    []Temp >100.9 F (38.3 C) or < 96.8 F (36 C)  [x]HR > 90  []RR > 20  [x]WBC > 12 or < 4 or 10% bands    AND:    [x] Infection Confirmed or Suspected.     Must meet 1:    []Lactate > 2       or   [x]Signs of Organ Dysfunction:    - SBP < 90 or MAP < 65  -Creatinine > 2 or increased from baseline  -Urine Output < 0.5 ml/kg/hr  -Bilirubin > 2  -INR > 1.5 (not anticoagulated)  -Platelets < 100,000  -Acute Respiratory Failure as evidenced by new need for NIPPV or mechanical ventilation   Must meet 1:    []Lactate > 4        or   []SBP < 90 or MAP < 65 for at least two readings in the first hour after fluid bolus administration    []Vasopressors initiated (if hypotension persists after fluid resuscitation)   Patient Vitals for the past 6  contain errors related to that system including errors in grammar, punctuation, and spelling, as well as words and phrases that may be inappropriate. If there are any questions or concerns please feel free to contact the dictating provider for clarification.     Stephanie Baig MD   Acute Care [facility]        Stephanie Baig MD  07/18/25 9023

## 2025-07-18 NOTE — ACP (ADVANCE CARE PLANNING)
Advance Care Planning     General Advance Care Planning (ACP) Conversation    Date of Conversation: 7/18/2025  Conducted with: Patient with Decision Making Capacity and Healthcare Decision Maker and dtr at bedside  Other persons present: Daughter      Healthcare Decision Maker:   Primary Decision Maker: ManiRenae - Child - [phone]     Today we documented Decision Maker(s) consistent with Legal Next of Kin hierarchy.  Content/Action Overview:  Has ACP document(s) NOT on file - requested patient to provide  Remains full code    Length of Voluntary ACP Conversation in minutes:  <16 minutes (Non-Billable)    Minerva Vasques RN

## 2025-07-18 NOTE — PLAN OF CARE
Problem: Pain  Goal: Verbalizes/displays adequate comfort level or baseline comfort level  Outcome: Progressing     Problem: Safety - Adult  Goal: Free from fall injury  Outcome: Progressing     Problem: Skin/Tissue Integrity  Goal: Skin integrity remains intact  Description: 1.  Monitor for areas of redness and/or skin breakdown  2.  Assess vascular access sites hourly  3.  Every 4-6 hours minimum:  Change oxygen saturation probe site  4.  Every 4-6 hours:  If on nasal continuous positive airway pressure, respiratory therapy assess nares and determine need for appliance change or resting period  Outcome: Progressing     Problem: Skin/Tissue Integrity - Adult  Goal: Skin integrity remains intact  Description: 1.  Monitor for areas of redness and/or skin breakdown  2.  Assess vascular access sites hourly  3.  Every 4-6 hours minimum:  Change oxygen saturation probe site  4.  Every 4-6 hours:  If on nasal continuous positive airway pressure, respiratory therapy assess nares and determine need for appliance change or resting period  Outcome: Progressing

## 2025-07-18 NOTE — CARE COORDINATION
Case Management Assessment  Initial Evaluation    Date/Time of Evaluation: 7/18/2025 2:38 PM  Assessment Completed by: Minerva Vasques RN    If patient is discharged prior to next notation, then this note serves as note for discharge by case management.    Patient Name: Annie Singleton                   YOB: 1940  Diagnosis: Cellulitis [L03.90]  Septicemia (HCC) [A41.9]  Generalized weakness [R53.1]  COLLINS (acute kidney injury) [N17.9]  Cellulitis of left lower extremity [L03.116]                   Date / Time: 7/18/2025  3:17 AM    Patient Admission Status: Inpatient   Readmission Risk (Low < 19, Mod (19-27), High > 27): Readmission Risk Score: 14.9    Current PCP: Zachary Grimm MD  PCP verified by CM? Yes (PCP at [facility])    Chart Reviewed: Yes      History Provided by: Child/Family  Patient Orientation: Other (see comment) (drowsy)    Patient Cognition: Other (see comment) (drowsy)    Hospitalization in the last 30 days (Readmission):  No    If yes, Readmission Assessment in CM Navigator will be completed.    Advance Directives:      Code Status: Full Code   Patient's Primary Decision Maker is: Legal Next of Kin    Primary Decision Maker: Renae Singleton - Child - [phone]    Discharge Planning:    Patient lives with: Other (Comment) (LTC) Type of Home: Long-Term Care  Primary Care Giver: Other (Comment) (LTC)  Patient Support Systems include: Children   Current Financial resources: Medicare, Medicaid  Current community resources: ECF/Home Care  Current services prior to admission: Extended Care Placement            Current DME:              Type of Home Care services:       ADLS  Prior functional level: Assistance with the following:, Bathing, Dressing, Toileting, Cooking, Housework, Shopping, Mobility  Current functional level: Assistance with the following:, Bathing, Dressing, Toileting, Cooking, Housework, Shopping, Mobility    PT AM-PAC:   /24  OT AM-PAC:   /24    Family can provide

## 2025-07-18 NOTE — PROGRESS NOTES
4 Eyes Skin Assessment     NAME:  Annie Singleton  YOB: 1940  MEDICAL RECORD NUMBER:  [id number]    The patient is being assessed for  Admission    I agree that at least one RN has performed a thorough Head to Toe Skin Assessment on the patient. ALL assessment sites listed below have been assessed.      Areas assessed by both nurses:    Head, Face, Ears, Shoulders, Back, Chest, Arms, Elbows, Hands, Sacrum. Buttock, Coccyx, Ischium, and Legs. Feet and Heels        Does the Patient have a Wound? Yes wound(s) were present on assessment. LDA wound assessment was Initiated and completed by RN       Ben Prevention initiated by RN: Yes  Wound Care Orders initiated by RN: Yes    For hospital-acquired stage 1 & 2 and ALL Stage 3,4, Unstageable, DTI, NWPT, and Complex wounds: place order “IP Wound Care/Ostomy Nurse Eval and Treat” by RN under : Yes    New Ostomies, if present place, Ostomy referral order under : No     Nurse 1 eSignature: Electronically signed by JEFFREY ROGER RN on 7/18/25 at 4:10 PM EDT    **SHARE this note so that the co-signing nurse can place an eSignature**    Nurse 2 eSignature: {Esignature:[id number]}

## 2025-07-18 NOTE — ED NOTES
Annie Singleton is a 84 y.o. female admitted for  Principal Problem:    Cellulitis  Resolved Problems:    * No resolved hospital problems. *  .   Patient SNF LTC via EMS transportation with   Chief Complaint   Patient presents with    abnormal lab      From [facility] with abnormal lab K 2.7 from 7/16 and Generalized weakness.    .  Patient is alert and Person, Place, Time, and Situation  Patient's baseline mobility: Baseline Mobility: Walker  Code Status: Prior   Cardiac Rhythm:    O2 Flow Rate (L/min): 2 L/min  Is patient on baseline Oxygen: no how many Liters:   Abnormal Assessment Findings: Gen. weakness    Isolation: None      NIH Score:    C-SSRS:    Bedside swallow:        Active LDA's:   Peripheral IV 07/18/25 Left Antecubital (Active)   Site Assessment Clean, dry & intact 07/18/25 0343   Line Status Normal saline locked 07/18/25 0343   Phlebitis Assessment No symptoms 07/18/25 0343   Infiltration Assessment 0 07/18/25 0343   Alcohol Cap Used Yes 07/18/25 0343   Dressing Status Clean, dry & intact 07/18/25 0343   Dressing Type Transparent 07/18/25 0343     Family/Caregiver Present yes Any Concerns: no   Restraints no  Sitter no         Vitals:      Vitals:    07/18/25 0323 07/18/25 0354   BP: 101/74 116/61   Pulse: 92 85   Resp: 19 20   Temp: 98.5 °F (36.9 °C)    TempSrc: Oral    SpO2: 94% 94%   Weight: 114.8 kg (253 lb)    Height: 1.524 m (5')        Last documented pain score (0-10 scale) Pain Level: 7  Pain medication administered No.    Pertinent or High Risk Medications/Drips: No.    Pending Blood Product Administration: no    Abnormal labs:   Abnormal Labs Reviewed   CBC WITH AUTO DIFFERENTIAL - Abnormal; Notable for the following components:       Result Value    WBC 22.7 (*)     RBC 3.86 (*)     Neutrophils Absolute 21.3 (*)     Lymphocytes Absolute 0.6 (*)     All other components within normal limits   COMPREHENSIVE METABOLIC PANEL W/ REFLEX TO MG FOR LOW K - Abnormal; Notable for the

## 2025-07-18 NOTE — PROGRESS NOTES
Patient admitted by my Colleague early this am. Reviewed lab work, orders and  and imaging. Appears stable. Will see tomorrow.    Libertad Cross, KIRILL - CNP

## 2025-07-18 NOTE — CONSULTS
Infectious Diseases   Consult Note      Reason for Consult:  acute febrile illness   Requesting Physician:  Erin       Date of Admission: 7/18/2025    Subjective:   CHIEF COMPLAINT:  none given       HPI:   Alessia Singleton is an 84yoF with history of GERD, HTN, IBS, afib, asthma  Patricio TKA                ED 7/7/25 - trauma to L 2nd toe w fracture proximal phalanx, laceration, sutured  Discharged on po cephalexin              She was referred to Ortho for follow-up and was seen on 7/17/25.  XR of the toe revealed displaced fracture of 2nd prox phalanx.  Conservative     She was sent to the ED overnight for hypokalemia, weakness.  She was AF on arrival.  She had fever to 101 overnight   WBC was 22, CRP 54, ESR 51  COLLINS noted   She was admitted for evaluation        Current abx:  Vanc 1g q24    Cefepime, levaquin x1       Past Surgical History:       Diagnosis Date    Arrhythmia     Arthritis     OSTEOARTHRITIS    Asthma     Atrial fibrillation (HCC)     Diverticula of colon 10/12/2015    GERD (gastroesophageal reflux disease)     Hypertension     IBS (irritable bowel syndrome) 2010         Procedure Laterality Date    CHOLECYSTECTOMY, LAPAROSCOPIC N/A 12/2/2024    CHOLECYSTECTOMY LAPAROSCOPIC CHOLANGIOGRAM ROBOTIC XI performed by Zachary Escobar MD at [facility] OR    COLONOSCOPY      1994 OCT 12,2015 (diverticulosis)    HYSTERECTOMY (CERVIX STATUS UNKNOWN)      JOINT REPLACEMENT      PATRICIO TKR    KNEE SURGERY           Social History:    TOBACCO:   reports that she quit smoking about 50 years ago. Her smoking use included cigarettes. She started smoking about 52 years ago. She has a 0.5 pack-year smoking history. She has never used smokeless tobacco.  ETOH:   reports no history of alcohol use.  There is no history of illicit drug use or other significant epidemiologic exposures.      Family History:       Problem Relation Age of Onset    Heart Disease Mother     Other Father     Other Sister     Cancer Brother         Current Medications:    Current Facility-Administered Medications: sodium chloride flush 0.9 % injection 5-40 mL, 5-40 mL, IntraVENous, 2 times per day  sodium chloride flush 0.9 % injection 5-40 mL, 5-40 mL, IntraVENous, PRN  0.9 % sodium chloride infusion, , IntraVENous, PRN  potassium chloride (KLOR-CON M) extended release tablet 40 mEq, 40 mEq, Oral, PRN **OR** potassium bicarb-citric acid (EFFER-K) effervescent tablet 40 mEq, 40 mEq, Oral, PRN **OR** potassium chloride 10 mEq/100 mL IVPB (Peripheral Line), 10 mEq, IntraVENous, PRN  magnesium sulfate 2000 mg in 50 mL IVPB premix, 2,000 mg, IntraVENous, PRN  ondansetron (ZOFRAN-ODT) disintegrating tablet 4 mg, 4 mg, Oral, Q8H PRN **OR** ondansetron (ZOFRAN) injection 4 mg, 4 mg, IntraVENous, Q6H PRN  polyethylene glycol (GLYCOLAX) packet 17 g, 17 g, Oral, Daily PRN  acetaminophen (TYLENOL) tablet 650 mg, 650 mg, Oral, Q6H PRN **OR** acetaminophen (TYLENOL) suppository 650 mg, 650 mg, Rectal, Q6H PRN  lactated ringers infusion, , IntraVENous, Continuous  heparin (porcine) injection 5,000 Units, 5,000 Units, SubCUTAneous, 3 times per day  albuterol sulfate HFA (PROVENTIL;VENTOLIN;PROAIR) 108 (90 Base) MCG/ACT inhaler 2 puff, 2 puff, Inhalation, Q6H PRN  aspirin chewable tablet 81 mg, 81 mg, Oral, Daily  atorvastatin (LIPITOR) tablet 40 mg, 40 mg, Oral, Nightly  bumetanide (BUMEX) tablet 2 mg, 2 mg, Oral, Daily  citalopram (CELEXA) tablet 40 mg, 40 mg, Oral, Daily  gabapentin (NEURONTIN) capsule 100 mg, 100 mg, Oral, TID  meclizine (ANTIVERT) tablet 25 mg, 25 mg, Oral, TID PRN  melatonin disintegrating tablet 5 mg, 5 mg, Oral, PRN  pantoprazole (PROTONIX) tablet 40 mg, 40 mg, Oral, Daily  polyethylene glycol (GLYCOLAX) packet 17 g, 17 g, Oral, Daily  potassium chloride (KLOR-CON M) extended release tablet 40 mEq, 40 mEq, Oral, Daily with breakfast  fluticasone (FLOVENT HFA) 110 MCG/ACT inhaler 1 puff, 1 puff, Inhalation, Daily PRN  vancomycin (VANCOCIN) 1,000

## 2025-07-18 NOTE — CONSULTS
[facility] Nurse  Consult Note       NAME:  Annie Singleton  MEDICAL RECORD NUMBER:  [id number]  AGE: 84 y.o.   GENDER: female  : 1940  TODAY'S DATE:  2025    Subjective;Oh who are you?  O.K.   Daughter Renae at bedside.   Reason for WOCN Evaluation and Assessment:     lt leg         Annie Singleton is a 84 y.o. female referred by:   [] Physician  [x] Nursing  [] Other:     Wound Identification:  Wound Type: venous  Contributing Factors: edema and venous stasis    Wound History: wound Care has seen patient last Dec. & 2024 buttocks purple blanchable.    Current Wound Care Treatment:  BRIGITTE    Patient Goal of Care:  [x] Wound Healing  [] Odor Control  [] Palliative Care  [x] Pain Control   [] Other:         PAST MEDICAL HISTORY        Diagnosis Date    Arrhythmia     Arthritis     OSTEOARTHRITIS    Asthma     Atrial fibrillation (HCC)     Diverticula of colon 10/12/2015    GERD (gastroesophageal reflux disease)     Hypertension     IBS (irritable bowel syndrome)        PAST SURGICAL HISTORY    Past Surgical History:   Procedure Laterality Date    CHOLECYSTECTOMY, LAPAROSCOPIC N/A 2024    CHOLECYSTECTOMY LAPAROSCOPIC CHOLANGIOGRAM ROBOTIC XI performed by Zachary Escobar MD at [facility] OR    COLONOSCOPY      1994 OCT 12,2015 (diverticulosis)    HYSTERECTOMY (CERVIX STATUS UNKNOWN)      JOINT REPLACEMENT      PATRICIO TKR    KNEE SURGERY         FAMILY HISTORY    Family History   Problem Relation Age of Onset    Heart Disease Mother     Other Father     Other Sister     Cancer Brother        SOCIAL HISTORY    Social History     Tobacco Use    Smoking status: Former     Current packs/day: 0.00     Average packs/day: 0.3 packs/day for 2.0 years (0.5 ttl pk-yrs)     Types: Cigarettes     Start date: 1973     Quit date: 1975     Years since quittin.1    Smokeless tobacco: Never   Vaping Use    Vaping status: Never Used   Substance Use Topics    Alcohol use: No    Drug  1533   Tunneling Position ___ O'Clock 0 07/18/25 1533   Undermining Starts ___ O'Clock 0 07/18/25 1533   Undermining Ends___ O'Clock 0 07/18/25 1533   Undermining Maxium Distance (cm) 0 07/18/25 1533   Wound Assessment Devitalized tissue;Fluid filled blister;Pink/red 07/18/25 1533   Drainage Amount Scant (moist but unmeasurable) 07/18/25 1533   Drainage Description Serosanguinous 07/18/25 1533   Odor None 07/18/25 1533   Anai-wound Assessment Denuded 07/18/25 1533   Margins Unattached edges 07/18/25 1533   Number of days: 0       Wound 07/18/25 Toe (Comment  which one) Left 2nd toe (Active)   Wound Image   07/18/25 1533   Wound Etiology Diabetic 07/18/25 1533   Dressing Status New dressing applied 07/18/25 1533   Wound Cleansed Betadine/povidone iodine 07/18/25 1533   Dressing/Treatment Betadine swabs/povidone iodine 07/18/25 1533   Offloading for Diabetic Foot Ulcers Offloading ordered 07/18/25 1533   Dressing Change Due 07/19/25 07/18/25 1533   Distance Tunneling (cm) 0 cm 07/18/25 1533   Tunneling Position ___ O'Clock 0 07/18/25 1533   Undermining Starts ___ O'Clock 0 07/18/25 1533   Undermining Ends___ O'Clock 0 07/18/25 1533   Undermining Maxium Distance (cm) 0 07/18/25 1533   Wound Assessment Dry;Pink/red;Purple/maroon 07/18/25 1533   Drainage Amount None (dry) 07/18/25 1533   Odor None 07/18/25 1533   Anai-wound Assessment Denuded 07/18/25 1533   Margins Attached edges 07/18/25 1533   Wound Thickness Description not for Pressure Injury Partial thickness 07/18/25 1533   Number of days: 0     Incision 12/02/24 Abdomen Lower;Medial (Active)   Number of days: 228        07/18/25 1536   Skin Integumentary    Skin Color Blanchable erythema;Dusky;Red;Cody;Purple   Skin Condition/Temp Dry   Skin Integrity Other (comment)  (blanches, r/t sitting on bottom, no open areas.)   Location bi lateral buttocks   blanchable use zinc   Skin Integumentary (WDL) X      Skin Integrity Site 2   Skin Integrity Location 2 Redness

## 2025-07-18 NOTE — CONSULTS
Pharmacy to Dose Vancomycin    Dx: SSTI  Goal AUC  = 400-600  Pt wt = 114.8 kg  Recent Labs     07/18/25 0343   CREATININE 1.4*     Recent Labs     07/18/25 0343   WBC 22.7*       Regimen: 1000 mg IV every 24 hours.  Start time: 07:00 on 07/18/2025  Exposure target: AUC24 (range) 400-600 mg/L.hr   ZUO29-08: 350 mg/L.hr  AUC24,ss: 506 mg/L.hr  Probability of AUC24 > 400: 78 %  Ctrough,ss: 13.6 mg/L  Probability of Ctrough,ss > 20: 14 %  Vancomycin trough: 7/19 0600  Kristian Clarke RPH 7/18/2025 5:03 AM

## 2025-07-18 NOTE — CONSULTS
Pharmacy to Dose Vancomycin    Dx: SSTI x 5 days  Goal AUC  = 400-600  Pt wt = 114.8 kg  Recent Labs     07/18/25 0343   CREATININE 1.4*     Recent Labs     07/18/25 0343   WBC 22.7*       Regimen: 1000 mg IV every 24 hours.  Start time: 07:00 on 07/18/2025  Exposure target: AUC24 (range) 400-600 mg/L.hr   KCT11-55: 350 mg/L.hr  AUC24,ss: 506 mg/L.hr  Probability of AUC24 > 400: 78 %  Ctrough,ss: 13.6 mg/L  Probability of Ctrough,ss > 20: 14 %  Vancomycin trough: 7/19 0600  Kristian Clarke RPH 7/18/2025 5:03 AM

## 2025-07-18 NOTE — H&P
[facility] Medicine History & Physical    V 5.1    Date of Admission: 7/18/2025    Date of Service:  Pt seen/examined on 7/18/25    [x]Admitted to Inpatient with expected LOS greater than two midnights due to medical therapy.  []Placed in Observation status.    Chief Admission Complaint:  Cellulitis, COLLINS    Presenting Admission History:      84 y.o. female with PMHx of chronic diastolic CHF, PAF no longer on coumadin due to recurrent falls, HTN, GERD, presents from [facility] for generalized weakness, COLLINS and ongoing lower extremity cellulitis despite outpatient antibiotics with keflex and doxycycline. Nursing facility reported that she had lab work done on the 16th which was notable for potassium of 2.6. She is currently undergoing treatment for left lower extremity swelling and cellulitis. They also recently increased her Lasix given worsening lower extremity swelling. She has no worsening shortness of breath and on baseline 1-2L. No associated fever.   In the ED, her VS were stable. Labs revealed WBC 22, K 3.1, lactate was normal. Inflammatory markers were elevated. Pt to be admitted for further evaluation.    Assessment/Plan:      LE cellulitis - failed OP tx   - was on keflex and doxycycline as OP   - IV vanco + levaquin   - FU cxs    - pain control   - check LE doppler   - ID consult  Hypokalemia   - replete, monitor  COLLINS   - gentle IVF x 12hrs   - monitor Crt  Chronic diastolic CHF   - stable   - EF 56% 11/24   - cont GDMT    Labs/medications/imaging reviewed    Dispo: High risk to deteriorate with multiple medical problems and advanced age              High level medical complexity with complicated medical decision-making                Discussed management and the need for Hospitalization of the patient w/ the Emergency Department Provider: Yes    CXR: I have reviewed the CXR with the following interpretation: +atelectasis  EKG:  I have reviewed the EKG with the following interpretation:

## 2025-07-18 NOTE — ED NOTES
Assumed care from night shift. Pt wearing urine saturated pants and brief. Orders released prior to assuming care. Pt cleaned up and pure wick placed. Repositioned in bed. Pillows elevating both lower extremities. Dressing in place to right lower, post op shoe on left foot. Left leg red and swollen. Daughter at bedside. Pt is blind. Rectally febrile, pt warm to touch. Medicated for c/o headache with Tylenol, advised pt/daughter will help treat fever as well. Pt resting comfortably without additional complaints.

## 2025-07-18 NOTE — PROGRESS NOTES
.  4 Eyes Skin Assessment and Patient belongings     The patient is being assess for  Shift Handoff    I agree that 2 Nurses have performed a thorough Head to Toe Skin Assessment on the patient. ALL assessment sites listed below have been assessed.       Areas assessed by both nurses: Laisha RN and Lino RN  [x]   Head, Face, and Ears   [x]   Shoulders, Back, and Chest  [x]   Arms, Elbows, and Hands   [x]   Coccyx, Sacrum, and IschIum  [x]   Legs, Feet, and Heels        Does the Patient have Skin Breakdown?  Yes LDA WOUND CARE was Initiated documentation include the Aani-wound, Wound Assessment, Measurements, Dressing Treatment, Drainage, and Color\",     Pt has wound of right lower extremity        Ben Prevention initiated:  Yes   Wound Care Orders initiated:  Yes      WOC nurse consulted for Pressure Injury (Stage 3,4, Unstageable, DTI, NWPT, and Complex wounds), New and Established Ostomies:  Yes      I agree that 2 Nurses have reviewed patient belongings with the patient/family and documented in the flowsheet upon admission or transfer to the unit.             Nurse 1 eSignature: Electronically signed by Laisha Jones RN on 7/18/25 at 3:02 PM EDT    **SHARE this note so that the co-signing nurse is able to place an eSignature**    Nurse 2 eSignature: Electronically signed by LINO ROGER RN on 7/18/25 at 4:10 PM EDT

## 2025-07-18 NOTE — DISCHARGE INSTR - COC
Continuity of Care Form    Patient Name: Annie Singleton   :  1940  MRN:  [id number]    Admit date:  2025  Discharge date:      Code Status Order: Full Code   Advance Directives:     Admitting Physician:  Pedro Khan MD  PCP: Zachary Grimm MD    Discharging Nurse: [facility] Unit/Room#: 0342/0342-01  Discharging Unit Phone Number: [phone]    Emergency Contact:   Extended Emergency Contact Information  Primary Emergency Contact: Renae Singleton  Address: [address]  Home Phone: [phone]  Mobile Phone: [phone]  Relation: Child   needed? No  Secondary Emergency Contact: Azucena Johnson, [facility]  Home Phone: [phone]  Mobile Phone: [phone]  Relation: Grandchild    Past Surgical History:  Past Surgical History:   Procedure Laterality Date    CHOLECYSTECTOMY, LAPAROSCOPIC N/A 2024    CHOLECYSTECTOMY LAPAROSCOPIC CHOLANGIOGRAM ROBOTIC XI performed by Zachary Escobar MD at [facility] OR    COLONOSCOPY      1994 OCT 12,2015 (diverticulosis)    HYSTERECTOMY (CERVIX STATUS UNKNOWN)      JOINT REPLACEMENT      PATRICIO TKR    KNEE SURGERY         Immunization History:   Immunization History   Administered Date(s) Administered    COVID-19, PFIZER Bivalent, DO NOT Dilute, (age 12y+), IM, 30 mcg/0.3 mL 10/02/2022    COVID-19, PFIZER PURPLE top, DILUTE for use, (age 12 y+), 30mcg/0.3mL 2021, 2021, 10/27/2021    DTaP 2012    Influenza Virus Vaccine 2017    Pneumococcal, PCV-13, PREVNAR 13, (age 6w+), IM, 0.5mL 2015    Pneumococcal, PPSV23, PNEUMOVAX 23, (age 2y+), SC/IM, 0.5mL 2006, 10/01/2012    TDaP, ADACEL (age 10y-64y), BOOSTRIX (age 10y+), IM, 0.5mL 2015       Active Problems:  Patient Active Problem List   Diagnosis Code    Arrhythmia I49.9    Hypertension I10    Atrial fibrillation (HCC) I48.91    Acute pancreatitis  K85.90    Chest pain R07.9    Dizziness R42    Multiple falls R29.6    Shortness of breath R06.02    Establishing care with new doctor, encounter for Z76.89    Medication management Z79.899    Morbid obesity (HCC) E66.01    Bradycardia R00.1    Acute cholecystitis K81.0    Bacteremia due to Pseudomonas R78.81, B96.5    Nonsustained ventricular tachycardia (HCC) I47.29    Leukocytosis D72.829    QT prolongation R94.31    Encounter for postoperative care Z48.89    Status post cholecystectomy Z90.49    Cellulitis L03.90    Cellulitis of right leg L03.115    COLLINS (acute kidney injury) N17.9    Generalized weakness R53.1       Isolation/Infection:   Isolation            No Isolation          Patient Infection Status    None to display         Nurse Assessment:  Last Vital Signs: BP (!) 98/47   Pulse 77   Temp 97.3 °F (36.3 °C) (Oral)   Resp 18   Ht 1.524 m (5')   Wt 115.6 kg (254 lb 13.6 oz)   SpO2 95%   BMI 49.77 kg/m²     Last documented pain score (0-10 scale): Pain Level: 4  Last Weight:   Wt Readings from Last 1 Encounters:   07/18/25 115.6 kg (254 lb 13.6 oz)     Mental Status:  oriented    IV Access:  - None    Nursing Mobility/ADLs:  Walking   Dependent  Transfer  Dependent  Bathing  Dependent  Dressing  Dependent  Toileting  Dependent  Feeding  Assisted  Med Admin  Dependent  Med Delivery   whole    Wound Care Documentation and Therapy:  Wound 11/17/17 Skin tear Hand Left (Active)   Number of days: 2800       Wound 11/26/24 Buttocks Mid;Posterior purple/red blanchable, (Active)   Number of days: 233       Wound 11/27/24 Groin Left (Active)   Number of days: 233       Wound 07/18/25 Pretibial Right (Active)   Wound Image   07/18/25 1533   Wound Etiology Venous 07/18/25 1533   Dressing Status New dressing applied 07/18/25 1533   Wound Cleansed Cleansed with saline 07/18/25 1533   Dressing/Treatment Xeroform;Foam impregnated with Ag;Dry dressing;Roll gauze;Ace wrap 07/18/25 1533   Offloading for Diabetic Foot

## 2025-07-18 NOTE — ED NOTES
@[id number] paged Dr. Wright   Per: Pedro Khan MD   RE: LE cellulitis failed OP tx  Said no callback required due to being an ED hold, wanted provider to be aware of patient

## 2025-07-19 LAB
ALBUMIN SERPL-MCNC: 2.7 G/DL (ref 3.4–5)
ALBUMIN/GLOB SERPL: 1 {RATIO} (ref 1.1–2.2)
ALP SERPL-CCNC: 107 U/L (ref 40–129)
ALT SERPL-CCNC: 7 U/L (ref 10–40)
ANION GAP SERPL CALCULATED.3IONS-SCNC: 10 MMOL/L (ref 3–16)
AST SERPL-CCNC: 24 U/L (ref 15–37)
BASOPHILS # BLD: 0.1 K/UL (ref 0–0.2)
BASOPHILS NFR BLD: 0.6 %
BILIRUB SERPL-MCNC: 1.4 MG/DL (ref 0–1)
BUN SERPL-MCNC: 37 MG/DL (ref 7–20)
CALCIUM SERPL-MCNC: 8.4 MG/DL (ref 8.3–10.6)
CHLORIDE SERPL-SCNC: 83 MMOL/L (ref 99–110)
CO2 SERPL-SCNC: 40 MMOL/L (ref 21–32)
CREAT SERPL-MCNC: 1.3 MG/DL (ref 0.6–1.2)
DEPRECATED RDW RBC AUTO: 14.1 % (ref 12.4–15.4)
EKG DIAGNOSIS: NORMAL
EKG Q-T INTERVAL: 452 MS
EKG QRS DURATION: 88 MS
EKG QTC CALCULATION (BAZETT): 488 MS
EKG R AXIS: -24 DEGREES
EKG T AXIS: -32 DEGREES
EKG VENTRICULAR RATE: 70 BPM
EOSINOPHIL # BLD: 0.3 K/UL (ref 0–0.6)
EOSINOPHIL NFR BLD: 2.4 %
GFR SERPLBLD CREATININE-BSD FMLA CKD-EPI: 41 ML/MIN/{1.73_M2}
GLUCOSE SERPL-MCNC: 109 MG/DL (ref 70–99)
HCT VFR BLD AUTO: 32 % (ref 36–48)
HGB BLD-MCNC: 11.1 G/DL (ref 12–16)
LYMPHOCYTES # BLD: 1.7 K/UL (ref 1–5.1)
LYMPHOCYTES NFR BLD: 15.9 %
MCH RBC QN AUTO: 32.9 PG (ref 26–34)
MCHC RBC AUTO-ENTMCNC: 34.6 G/DL (ref 31–36)
MCV RBC AUTO: 95.2 FL (ref 80–100)
MONOCYTES # BLD: 1.5 K/UL (ref 0–1.3)
MONOCYTES NFR BLD: 14 %
NEUTROPHILS # BLD: 7.3 K/UL (ref 1.7–7.7)
NEUTROPHILS NFR BLD: 67.1 %
PLATELET # BLD AUTO: 269 K/UL (ref 135–450)
PMV BLD AUTO: 7.9 FL (ref 5–10.5)
POTASSIUM SERPL-SCNC: 3.3 MMOL/L (ref 3.5–5.1)
PROT SERPL-MCNC: 5.3 G/DL (ref 6.4–8.2)
RBC # BLD AUTO: 3.36 M/UL (ref 4–5.2)
SODIUM SERPL-SCNC: 133 MMOL/L (ref 136–145)
WBC # BLD AUTO: 10.9 K/UL (ref 4–11)

## 2025-07-19 PROCEDURE — 6370000000 HC RX 637 (ALT 250 FOR IP): Performed by: NURSE PRACTITIONER

## 2025-07-19 PROCEDURE — 82728 ASSAY OF FERRITIN: CPT

## 2025-07-19 PROCEDURE — 6370000000 HC RX 637 (ALT 250 FOR IP): Performed by: HOSPITALIST

## 2025-07-19 PROCEDURE — 36415 COLL VENOUS BLD VENIPUNCTURE: CPT

## 2025-07-19 PROCEDURE — 80053 COMPREHEN METABOLIC PANEL: CPT

## 2025-07-19 PROCEDURE — 85025 COMPLETE CBC W/AUTO DIFF WBC: CPT

## 2025-07-19 PROCEDURE — 2580000003 HC RX 258: Performed by: NURSE PRACTITIONER

## 2025-07-19 PROCEDURE — 2500000003 HC RX 250 WO HCPCS: Performed by: HOSPITALIST

## 2025-07-19 PROCEDURE — 6360000002 HC RX W HCPCS: Performed by: INTERNAL MEDICINE

## 2025-07-19 PROCEDURE — 1200000000 HC SEMI PRIVATE

## 2025-07-19 PROCEDURE — 2500000003 HC RX 250 WO HCPCS: Performed by: INTERNAL MEDICINE

## 2025-07-19 PROCEDURE — 83540 ASSAY OF IRON: CPT

## 2025-07-19 PROCEDURE — 83550 IRON BINDING TEST: CPT

## 2025-07-19 PROCEDURE — 6360000002 HC RX W HCPCS: Performed by: HOSPITALIST

## 2025-07-19 PROCEDURE — 94761 N-INVAS EAR/PLS OXIMETRY MLT: CPT

## 2025-07-19 PROCEDURE — 93005 ELECTROCARDIOGRAM TRACING: CPT | Performed by: NURSE PRACTITIONER

## 2025-07-19 PROCEDURE — 2700000000 HC OXYGEN THERAPY PER DAY

## 2025-07-19 PROCEDURE — 93010 ELECTROCARDIOGRAM REPORT: CPT | Performed by: INTERNAL MEDICINE

## 2025-07-19 RX ORDER — SODIUM CHLORIDE 9 MG/ML
INJECTION, SOLUTION INTRAVENOUS CONTINUOUS
Status: ACTIVE | OUTPATIENT
Start: 2025-07-19 | End: 2025-07-20

## 2025-07-19 RX ORDER — HYDROXYZINE HYDROCHLORIDE 10 MG/1
10 TABLET, FILM COATED ORAL 3 TIMES DAILY PRN
Status: DISCONTINUED | OUTPATIENT
Start: 2025-07-19 | End: 2025-07-23 | Stop reason: HOSPADM

## 2025-07-19 RX ORDER — METHOCARBAMOL 500 MG/1
500 TABLET, FILM COATED ORAL ONCE
Status: COMPLETED | OUTPATIENT
Start: 2025-07-19 | End: 2025-07-19

## 2025-07-19 RX ORDER — OXYCODONE HYDROCHLORIDE 5 MG/1
5 TABLET ORAL EVERY 4 HOURS PRN
Refills: 0 | Status: DISCONTINUED | OUTPATIENT
Start: 2025-07-19 | End: 2025-07-23 | Stop reason: HOSPADM

## 2025-07-19 RX ADMIN — PANTOPRAZOLE SODIUM 40 MG: 40 TABLET, DELAYED RELEASE ORAL at 09:27

## 2025-07-19 RX ADMIN — ACETAMINOPHEN 650 MG: 325 TABLET ORAL at 01:17

## 2025-07-19 RX ADMIN — WATER 2000 MG: 1 INJECTION INTRAMUSCULAR; INTRAVENOUS; SUBCUTANEOUS at 15:41

## 2025-07-19 RX ADMIN — ONDANSETRON 4 MG: 2 INJECTION, SOLUTION INTRAMUSCULAR; INTRAVENOUS at 10:46

## 2025-07-19 RX ADMIN — POTASSIUM CHLORIDE 40 MEQ: 1500 TABLET, EXTENDED RELEASE ORAL at 09:20

## 2025-07-19 RX ADMIN — ACETAMINOPHEN 650 MG: 325 TABLET ORAL at 09:20

## 2025-07-19 RX ADMIN — OXYCODONE 5 MG: 5 TABLET ORAL at 10:48

## 2025-07-19 RX ADMIN — GABAPENTIN 100 MG: 100 CAPSULE ORAL at 09:27

## 2025-07-19 RX ADMIN — HEPARIN SODIUM 5000 UNITS: 5000 INJECTION INTRAVENOUS; SUBCUTANEOUS at 00:12

## 2025-07-19 RX ADMIN — MICONAZOLE NITRATE: 2 POWDER TOPICAL at 09:27

## 2025-07-19 RX ADMIN — HYDROXYZINE HYDROCHLORIDE 10 MG: 10 TABLET ORAL at 23:52

## 2025-07-19 RX ADMIN — HYDROXYZINE HYDROCHLORIDE 10 MG: 10 TABLET ORAL at 10:59

## 2025-07-19 RX ADMIN — HEPARIN SODIUM 5000 UNITS: 5000 INJECTION INTRAVENOUS; SUBCUTANEOUS at 23:52

## 2025-07-19 RX ADMIN — BUMETANIDE 2 MG: 1 TABLET ORAL at 10:49

## 2025-07-19 RX ADMIN — HEPARIN SODIUM 5000 UNITS: 5000 INJECTION INTRAVENOUS; SUBCUTANEOUS at 07:32

## 2025-07-19 RX ADMIN — ASPIRIN 81 MG: 81 TABLET, CHEWABLE ORAL at 09:27

## 2025-07-19 RX ADMIN — POLYETHYLENE GLYCOL 3350 17 G: 17 POWDER, FOR SOLUTION ORAL at 09:21

## 2025-07-19 RX ADMIN — Medication 10 ML: at 09:28

## 2025-07-19 RX ADMIN — HEPARIN SODIUM 5000 UNITS: 5000 INJECTION INTRAVENOUS; SUBCUTANEOUS at 15:36

## 2025-07-19 RX ADMIN — METHOCARBAMOL 500 MG: 500 TABLET ORAL at 17:31

## 2025-07-19 RX ADMIN — WATER 2000 MG: 1 INJECTION INTRAMUSCULAR; INTRAVENOUS; SUBCUTANEOUS at 00:12

## 2025-07-19 RX ADMIN — CITALOPRAM HYDROBROMIDE 40 MG: 20 TABLET ORAL at 09:27

## 2025-07-19 RX ADMIN — SODIUM CHLORIDE: 0.9 INJECTION, SOLUTION INTRAVENOUS at 09:41

## 2025-07-19 RX ADMIN — HYDROXYZINE HYDROCHLORIDE 10 MG: 10 TABLET ORAL at 01:03

## 2025-07-19 RX ADMIN — WATER 2000 MG: 1 INJECTION INTRAMUSCULAR; INTRAVENOUS; SUBCUTANEOUS at 09:43

## 2025-07-19 RX ADMIN — GABAPENTIN 100 MG: 100 CAPSULE ORAL at 15:36

## 2025-07-19 RX ADMIN — OXYCODONE 5 MG: 5 TABLET ORAL at 17:30

## 2025-07-19 ASSESSMENT — PAIN SCALES - GENERAL
PAINLEVEL_OUTOF10: 4
PAINLEVEL_OUTOF10: 7
PAINLEVEL_OUTOF10: 4

## 2025-07-19 ASSESSMENT — PAIN DESCRIPTION - LOCATION
LOCATION: HEAD
LOCATION: FOOT
LOCATION: HEAD

## 2025-07-19 NOTE — PROGRESS NOTES
Hospital Medicine Progress Note  V 5.17      Date of Admission: 7/18/2025    Hospital Day: 2      Chief Admission Complaint:  cellulitis, COLLINS    Subjective:    Pt states that she feels better today and that her BLE are not as painful today.     Presenting Admission History:       This is an 84 y.o. female with PMHx of chronic diastolic CHF, PAF no longer on coumadin due to recurrent falls, HTN, GERD, presents from [facility] for generalized weakness, COLLINS and ongoing lower extremity cellulitis despite outpatient antibiotics with keflex and doxycycline. Nursing facility reported that she had lab work done on the 16th which was notable for potassium of 2.6. She is currently undergoing treatment for left lower extremity swelling and cellulitis. They also recently increased her Lasix given worsening lower extremity swelling. She has no worsening shortness of breath and on baseline 1-2L. No associated fever.   In the ED, her VS were stable. Labs revealed WBC 22, K 3.1, lactate was normal. Inflammatory markers were elevated. Pt to be admitted for further evaluation.    Assessment/Plan:      BLE cellulitis:   - ID consulted and rec: Changed atbx to cefazolin 2000 mg IV q 8 hrs, Received Vanc and Levaquin on admission.   - Wound care consulted - venous statsis        ulcers  - Blood cx pending     - BLE doppler: 7/18 - no evidence of deep of superficial venous thrombosis, BLE    Traumatic left foot second toe proximal phalanx intra-articular fracture with laceration:  -Treated 7/7/25 in ED, laceration sutured, d/c on PO cephalexin  - Ortho consulted: recommend non surgical intervention, conservative management     HypoKalemia - etiology clinically unable to determine.  Followed serial Potassium, personally reviewed and documented in this note. Replaced PRN.      ARF - with elevated BUN/Cr ratio, likely secondary to pre-renal azotemia.  Followed serial BUN/Creatinine on IVF hydration personally reviewed and

## 2025-07-19 NOTE — CONSULTS
Consult Call Back    Who:Agustina Rosado   Date:7/18/2025,  Time:8:51 PM    Electronically signed by Vicenta Figueroa on 7/18/25 at 8:51 PM EDT

## 2025-07-20 LAB
ALBUMIN SERPL-MCNC: 2.8 G/DL (ref 3.4–5)
ALP SERPL-CCNC: 104 U/L (ref 40–129)
ALT SERPL-CCNC: <5 U/L (ref 10–40)
ANION GAP SERPL CALCULATED.3IONS-SCNC: 8 MMOL/L (ref 3–16)
AST SERPL-CCNC: 22 U/L (ref 15–37)
BASOPHILS # BLD: 0.1 K/UL (ref 0–0.2)
BASOPHILS NFR BLD: 0.8 %
BILIRUB DIRECT SERPL-MCNC: 0.4 MG/DL (ref 0–0.3)
BILIRUB INDIRECT SERPL-MCNC: 0.3 MG/DL (ref 0–1)
BILIRUB SERPL-MCNC: 0.7 MG/DL (ref 0–1)
BUN SERPL-MCNC: 29 MG/DL (ref 7–20)
CALCIUM SERPL-MCNC: 8.3 MG/DL (ref 8.3–10.6)
CHLORIDE SERPL-SCNC: 89 MMOL/L (ref 99–110)
CHOLEST SERPL-MCNC: 99 MG/DL (ref 0–199)
CO2 SERPL-SCNC: 39 MMOL/L (ref 21–32)
CREAT SERPL-MCNC: 1.1 MG/DL (ref 0.6–1.2)
DEPRECATED RDW RBC AUTO: 14.2 % (ref 12.4–15.4)
EOSINOPHIL # BLD: 0.3 K/UL (ref 0–0.6)
EOSINOPHIL NFR BLD: 4 %
FERRITIN SERPL IA-MCNC: 407 NG/ML (ref 15–150)
FOLATE SERPL-MCNC: 11.9 NG/ML (ref 4.78–24.2)
GFR SERPLBLD CREATININE-BSD FMLA CKD-EPI: 50 ML/MIN/{1.73_M2}
GLUCOSE SERPL-MCNC: 108 MG/DL (ref 70–99)
HCT VFR BLD AUTO: 31.9 % (ref 36–48)
HDLC SERPL-MCNC: 38 MG/DL (ref 40–60)
HGB BLD-MCNC: 11.2 G/DL (ref 12–16)
IRON SATN MFR SERPL: 13 % (ref 15–50)
IRON SERPL-MCNC: 29 UG/DL (ref 37–145)
LDLC SERPL CALC-MCNC: 43 MG/DL
LYMPHOCYTES # BLD: 1.8 K/UL (ref 1–5.1)
LYMPHOCYTES NFR BLD: 23 %
MAGNESIUM SERPL-MCNC: 2.37 MG/DL (ref 1.8–2.4)
MCH RBC QN AUTO: 33.3 PG (ref 26–34)
MCHC RBC AUTO-ENTMCNC: 35.2 G/DL (ref 31–36)
MCV RBC AUTO: 94.4 FL (ref 80–100)
MONOCYTES # BLD: 0.9 K/UL (ref 0–1.3)
MONOCYTES NFR BLD: 11.3 %
NEUTROPHILS # BLD: 4.8 K/UL (ref 1.7–7.7)
NEUTROPHILS NFR BLD: 60.9 %
PLATELET # BLD AUTO: 295 K/UL (ref 135–450)
PMV BLD AUTO: 7.7 FL (ref 5–10.5)
POTASSIUM SERPL-SCNC: 3.1 MMOL/L (ref 3.5–5.1)
PROT SERPL-MCNC: 5.3 G/DL (ref 6.4–8.2)
RBC # BLD AUTO: 3.37 M/UL (ref 4–5.2)
SODIUM SERPL-SCNC: 136 MMOL/L (ref 136–145)
TIBC SERPL-MCNC: 219 UG/DL (ref 260–445)
TRIGL SERPL-MCNC: 88 MG/DL (ref 0–150)
VIT B12 SERPL-MCNC: 590 PG/ML (ref 211–911)
VLDLC SERPL CALC-MCNC: 18 MG/DL
WBC # BLD AUTO: 7.9 K/UL (ref 4–11)

## 2025-07-20 PROCEDURE — 82746 ASSAY OF FOLIC ACID SERUM: CPT

## 2025-07-20 PROCEDURE — 6360000002 HC RX W HCPCS: Performed by: INTERNAL MEDICINE

## 2025-07-20 PROCEDURE — 85025 COMPLETE CBC W/AUTO DIFF WBC: CPT

## 2025-07-20 PROCEDURE — 6370000000 HC RX 637 (ALT 250 FOR IP): Performed by: NURSE PRACTITIONER

## 2025-07-20 PROCEDURE — 2500000003 HC RX 250 WO HCPCS: Performed by: HOSPITALIST

## 2025-07-20 PROCEDURE — 80048 BASIC METABOLIC PNL TOTAL CA: CPT

## 2025-07-20 PROCEDURE — 6360000002 HC RX W HCPCS: Performed by: HOSPITALIST

## 2025-07-20 PROCEDURE — 2500000003 HC RX 250 WO HCPCS: Performed by: INTERNAL MEDICINE

## 2025-07-20 PROCEDURE — 82607 VITAMIN B-12: CPT

## 2025-07-20 PROCEDURE — 83735 ASSAY OF MAGNESIUM: CPT

## 2025-07-20 PROCEDURE — 94761 N-INVAS EAR/PLS OXIMETRY MLT: CPT

## 2025-07-20 PROCEDURE — 1200000000 HC SEMI PRIVATE

## 2025-07-20 PROCEDURE — 6370000000 HC RX 637 (ALT 250 FOR IP): Performed by: HOSPITALIST

## 2025-07-20 PROCEDURE — 80076 HEPATIC FUNCTION PANEL: CPT

## 2025-07-20 PROCEDURE — 36415 COLL VENOUS BLD VENIPUNCTURE: CPT

## 2025-07-20 PROCEDURE — 80061 LIPID PANEL: CPT

## 2025-07-20 PROCEDURE — 2700000000 HC OXYGEN THERAPY PER DAY

## 2025-07-20 PROCEDURE — 2580000003 HC RX 258: Performed by: NURSE PRACTITIONER

## 2025-07-20 RX ORDER — GABAPENTIN 100 MG/1
200 CAPSULE ORAL 3 TIMES DAILY
Status: DISCONTINUED | OUTPATIENT
Start: 2025-07-20 | End: 2025-07-23 | Stop reason: HOSPADM

## 2025-07-20 RX ORDER — METHOCARBAMOL 500 MG/1
500 TABLET, FILM COATED ORAL 3 TIMES DAILY
Status: DISCONTINUED | OUTPATIENT
Start: 2025-07-20 | End: 2025-07-23 | Stop reason: HOSPADM

## 2025-07-20 RX ADMIN — HEPARIN SODIUM 5000 UNITS: 5000 INJECTION INTRAVENOUS; SUBCUTANEOUS at 23:02

## 2025-07-20 RX ADMIN — Medication 10 ML: at 00:17

## 2025-07-20 RX ADMIN — MICONAZOLE NITRATE: 2 POWDER TOPICAL at 08:23

## 2025-07-20 RX ADMIN — ASPIRIN 81 MG: 81 TABLET, CHEWABLE ORAL at 08:21

## 2025-07-20 RX ADMIN — ONDANSETRON 4 MG: 4 TABLET, ORALLY DISINTEGRATING ORAL at 12:14

## 2025-07-20 RX ADMIN — PANTOPRAZOLE SODIUM 40 MG: 40 TABLET, DELAYED RELEASE ORAL at 08:22

## 2025-07-20 RX ADMIN — GABAPENTIN 200 MG: 100 CAPSULE ORAL at 15:54

## 2025-07-20 RX ADMIN — HEPARIN SODIUM 5000 UNITS: 5000 INJECTION INTRAVENOUS; SUBCUTANEOUS at 15:54

## 2025-07-20 RX ADMIN — ATORVASTATIN CALCIUM 40 MG: 40 TABLET, FILM COATED ORAL at 20:05

## 2025-07-20 RX ADMIN — GABAPENTIN 100 MG: 100 CAPSULE ORAL at 08:22

## 2025-07-20 RX ADMIN — CITALOPRAM HYDROBROMIDE 40 MG: 20 TABLET ORAL at 08:21

## 2025-07-20 RX ADMIN — HEPARIN SODIUM 5000 UNITS: 5000 INJECTION INTRAVENOUS; SUBCUTANEOUS at 08:22

## 2025-07-20 RX ADMIN — OXYCODONE 5 MG: 5 TABLET ORAL at 01:39

## 2025-07-20 RX ADMIN — Medication 10 ML: at 20:05

## 2025-07-20 RX ADMIN — Medication 10 ML: at 08:23

## 2025-07-20 RX ADMIN — HYDROXYZINE HYDROCHLORIDE 10 MG: 10 TABLET ORAL at 23:00

## 2025-07-20 RX ADMIN — WATER 2000 MG: 1 INJECTION INTRAMUSCULAR; INTRAVENOUS; SUBCUTANEOUS at 15:54

## 2025-07-20 RX ADMIN — SODIUM CHLORIDE: 0.9 INJECTION, SOLUTION INTRAVENOUS at 05:45

## 2025-07-20 RX ADMIN — GABAPENTIN 100 MG: 100 CAPSULE ORAL at 00:05

## 2025-07-20 RX ADMIN — METHOCARBAMOL 500 MG: 500 TABLET ORAL at 20:04

## 2025-07-20 RX ADMIN — METHOCARBAMOL 500 MG: 500 TABLET ORAL at 15:54

## 2025-07-20 RX ADMIN — ACETAMINOPHEN 650 MG: 325 TABLET ORAL at 13:32

## 2025-07-20 RX ADMIN — POLYETHYLENE GLYCOL 3350 17 G: 17 POWDER, FOR SOLUTION ORAL at 08:23

## 2025-07-20 RX ADMIN — WATER 2000 MG: 1 INJECTION INTRAMUSCULAR; INTRAVENOUS; SUBCUTANEOUS at 23:03

## 2025-07-20 RX ADMIN — OXYCODONE 5 MG: 5 TABLET ORAL at 23:00

## 2025-07-20 RX ADMIN — POTASSIUM CHLORIDE 40 MEQ: 1500 TABLET, EXTENDED RELEASE ORAL at 08:22

## 2025-07-20 RX ADMIN — WATER 2000 MG: 1 INJECTION INTRAMUSCULAR; INTRAVENOUS; SUBCUTANEOUS at 00:06

## 2025-07-20 RX ADMIN — BUMETANIDE 2 MG: 1 TABLET ORAL at 08:21

## 2025-07-20 RX ADMIN — WATER 2000 MG: 1 INJECTION INTRAMUSCULAR; INTRAVENOUS; SUBCUTANEOUS at 08:23

## 2025-07-20 RX ADMIN — GABAPENTIN 200 MG: 100 CAPSULE ORAL at 20:04

## 2025-07-20 ASSESSMENT — PAIN DESCRIPTION - DESCRIPTORS
DESCRIPTORS: ACHING

## 2025-07-20 ASSESSMENT — PAIN DESCRIPTION - LOCATION
LOCATION: HEAD
LOCATION: OTHER (COMMENT)
LOCATION: HEAD;TEETH
LOCATION: FACE

## 2025-07-20 ASSESSMENT — PAIN SCALES - GENERAL
PAINLEVEL_OUTOF10: 8
PAINLEVEL_OUTOF10: 7

## 2025-07-20 NOTE — PROGRESS NOTES
Hospital Medicine Progress Note  V 5.17      Date of Admission: 7/18/2025    Hospital Day: 3      Chief Admission Complaint:  cellulitis, COLLINS    Subjective:    Pt is lying in bed asleep. She awakes easily to voice. Pt states that her legs are not as painful today compared to yesterday.    Called to bedside by Mague CARMONA as family is concerned with mouth tremors. I did not notice mouth tremors during my assessment. Her granddaughter is at the bedside and tells me she had this same issue happen on her last admission and she does not have this issue at baseline. This may be a stress response to being ill and hospitalized.    Presenting Admission History:       This is an 84 y.o. female with PMHx of chronic diastolic CHF, PAF no longer on coumadin due to recurrent falls, HTN, GERD, presents from [facility] for generalized weakness, COLLINS and ongoing lower extremity cellulitis despite outpatient antibiotics with keflex and doxycycline. Nursing facility reported that she had lab work done on the 16th which was notable for potassium of 2.6. She is currently undergoing treatment for left lower extremity swelling and cellulitis. They also recently increased her Lasix given worsening lower extremity swelling. She has no worsening shortness of breath and on baseline 1-2L. No associated fever.   In the ED, her VS were stable. Labs revealed WBC 22, K 3.1, lactate was normal. Inflammatory markers were elevated. Pt to be admitted for further evaluation.    Assessment/Plan:    BLE cellulitis:   - ID consulted and rec: Changed atbx to cefazolin 2000 mg IV q 8 hrs, Received Vanc and Levaquin on admission. Atbx day 2 of 7.  - Wound care consulted - venous statsis ulcers  - Blood cx - collected 7/18 - NGTD     - BLE doppler: 7/18 - no evidence of deep of superficial venous thrombosis, BLE     Traumatic left foot second toe proximal phalanx intra-articular fracture with laceration:  -Treated 7/7/25 in ED, laceration sutured, d/c

## 2025-07-20 NOTE — PROGRESS NOTES
Pt. Resting and sleeping soundly, relayed to morning shift nurse that heparin SubQ wasn't given for 6am dose.

## 2025-07-21 LAB
ANION GAP SERPL CALCULATED.3IONS-SCNC: 7 MMOL/L (ref 3–16)
BASOPHILS # BLD: 0.1 K/UL (ref 0–0.2)
BASOPHILS NFR BLD: 0.9 %
BUN SERPL-MCNC: 20 MG/DL (ref 7–20)
CALCIUM SERPL-MCNC: 8.5 MG/DL (ref 8.3–10.6)
CHLORIDE SERPL-SCNC: 93 MMOL/L (ref 99–110)
CO2 SERPL-SCNC: 39 MMOL/L (ref 21–32)
CREAT SERPL-MCNC: 1 MG/DL (ref 0.6–1.2)
DEPRECATED RDW RBC AUTO: 14.1 % (ref 12.4–15.4)
EOSINOPHIL # BLD: 0.4 K/UL (ref 0–0.6)
EOSINOPHIL NFR BLD: 5.3 %
GFR SERPLBLD CREATININE-BSD FMLA CKD-EPI: 56 ML/MIN/{1.73_M2}
GLUCOSE SERPL-MCNC: 102 MG/DL (ref 70–99)
HCT VFR BLD AUTO: 32.1 % (ref 36–48)
HGB BLD-MCNC: 11 G/DL (ref 12–16)
LYMPHOCYTES # BLD: 1.5 K/UL (ref 1–5.1)
LYMPHOCYTES NFR BLD: 23 %
MAGNESIUM SERPL-MCNC: 2.39 MG/DL (ref 1.8–2.4)
MCH RBC QN AUTO: 32.9 PG (ref 26–34)
MCHC RBC AUTO-ENTMCNC: 34.4 G/DL (ref 31–36)
MCV RBC AUTO: 95.8 FL (ref 80–100)
MONOCYTES # BLD: 0.9 K/UL (ref 0–1.3)
MONOCYTES NFR BLD: 13.1 %
NEUTROPHILS # BLD: 3.8 K/UL (ref 1.7–7.7)
NEUTROPHILS NFR BLD: 57.7 %
NT-PROBNP SERPL-MCNC: 1800 PG/ML (ref 0–449)
PLATELET # BLD AUTO: 281 K/UL (ref 135–450)
PMV BLD AUTO: 7.9 FL (ref 5–10.5)
POTASSIUM SERPL-SCNC: 3.4 MMOL/L (ref 3.5–5.1)
RBC # BLD AUTO: 3.35 M/UL (ref 4–5.2)
REASON FOR REJECTION: NORMAL
REJECTED TEST: NORMAL
SODIUM SERPL-SCNC: 139 MMOL/L (ref 136–145)
WBC # BLD AUTO: 6.6 K/UL (ref 4–11)

## 2025-07-21 PROCEDURE — 99232 SBSQ HOSP IP/OBS MODERATE 35: CPT | Performed by: INTERNAL MEDICINE

## 2025-07-21 PROCEDURE — 2500000003 HC RX 250 WO HCPCS: Performed by: INTERNAL MEDICINE

## 2025-07-21 PROCEDURE — 6360000002 HC RX W HCPCS: Performed by: INTERNAL MEDICINE

## 2025-07-21 PROCEDURE — 1200000000 HC SEMI PRIVATE

## 2025-07-21 PROCEDURE — 85025 COMPLETE CBC W/AUTO DIFF WBC: CPT

## 2025-07-21 PROCEDURE — 36415 COLL VENOUS BLD VENIPUNCTURE: CPT

## 2025-07-21 PROCEDURE — 6360000002 HC RX W HCPCS: Performed by: NURSE PRACTITIONER

## 2025-07-21 PROCEDURE — 94761 N-INVAS EAR/PLS OXIMETRY MLT: CPT

## 2025-07-21 PROCEDURE — 80048 BASIC METABOLIC PNL TOTAL CA: CPT

## 2025-07-21 PROCEDURE — 83735 ASSAY OF MAGNESIUM: CPT

## 2025-07-21 PROCEDURE — 6360000002 HC RX W HCPCS: Performed by: HOSPITALIST

## 2025-07-21 PROCEDURE — 83880 ASSAY OF NATRIURETIC PEPTIDE: CPT

## 2025-07-21 PROCEDURE — 2700000000 HC OXYGEN THERAPY PER DAY

## 2025-07-21 PROCEDURE — 6370000000 HC RX 637 (ALT 250 FOR IP): Performed by: HOSPITALIST

## 2025-07-21 PROCEDURE — 6370000000 HC RX 637 (ALT 250 FOR IP): Performed by: NURSE PRACTITIONER

## 2025-07-21 PROCEDURE — 2580000003 HC RX 258: Performed by: NURSE PRACTITIONER

## 2025-07-21 RX ADMIN — ATORVASTATIN CALCIUM 40 MG: 40 TABLET, FILM COATED ORAL at 21:53

## 2025-07-21 RX ADMIN — HEPARIN SODIUM 5000 UNITS: 5000 INJECTION INTRAVENOUS; SUBCUTANEOUS at 07:44

## 2025-07-21 RX ADMIN — GABAPENTIN 200 MG: 100 CAPSULE ORAL at 14:53

## 2025-07-21 RX ADMIN — BUMETANIDE 2 MG: 1 TABLET ORAL at 08:27

## 2025-07-21 RX ADMIN — GABAPENTIN 200 MG: 100 CAPSULE ORAL at 08:27

## 2025-07-21 RX ADMIN — WATER 2000 MG: 1 INJECTION INTRAMUSCULAR; INTRAVENOUS; SUBCUTANEOUS at 08:28

## 2025-07-21 RX ADMIN — ASPIRIN 81 MG: 81 TABLET, CHEWABLE ORAL at 08:28

## 2025-07-21 RX ADMIN — METHOCARBAMOL 500 MG: 500 TABLET ORAL at 21:53

## 2025-07-21 RX ADMIN — ACETAMINOPHEN 650 MG: 325 TABLET ORAL at 11:37

## 2025-07-21 RX ADMIN — IRON SUCROSE 200 MG: 20 INJECTION, SOLUTION INTRAVENOUS at 11:43

## 2025-07-21 RX ADMIN — HEPARIN SODIUM 5000 UNITS: 5000 INJECTION INTRAVENOUS; SUBCUTANEOUS at 14:53

## 2025-07-21 RX ADMIN — HEPARIN SODIUM 5000 UNITS: 5000 INJECTION INTRAVENOUS; SUBCUTANEOUS at 22:03

## 2025-07-21 RX ADMIN — METHOCARBAMOL 500 MG: 500 TABLET ORAL at 08:27

## 2025-07-21 RX ADMIN — POTASSIUM CHLORIDE 40 MEQ: 1500 TABLET, EXTENDED RELEASE ORAL at 08:26

## 2025-07-21 RX ADMIN — WATER 2000 MG: 1 INJECTION INTRAMUSCULAR; INTRAVENOUS; SUBCUTANEOUS at 14:53

## 2025-07-21 RX ADMIN — METHOCARBAMOL 500 MG: 500 TABLET ORAL at 14:54

## 2025-07-21 RX ADMIN — CITALOPRAM HYDROBROMIDE 40 MG: 20 TABLET ORAL at 08:27

## 2025-07-21 RX ADMIN — GABAPENTIN 200 MG: 100 CAPSULE ORAL at 21:53

## 2025-07-21 RX ADMIN — POLYETHYLENE GLYCOL 3350 17 G: 17 POWDER, FOR SOLUTION ORAL at 08:28

## 2025-07-21 RX ADMIN — PANTOPRAZOLE SODIUM 40 MG: 40 TABLET, DELAYED RELEASE ORAL at 08:26

## 2025-07-21 RX ADMIN — MICONAZOLE NITRATE: 2 POWDER TOPICAL at 08:28

## 2025-07-21 ASSESSMENT — PAIN SCALES - GENERAL
PAINLEVEL_OUTOF10: 2
PAINLEVEL_OUTOF10: 7

## 2025-07-21 ASSESSMENT — PAIN DESCRIPTION - DESCRIPTORS: DESCRIPTORS: ACHING

## 2025-07-21 ASSESSMENT — PAIN DESCRIPTION - LOCATION
LOCATION: GENERALIZED
LOCATION: HEAD

## 2025-07-21 NOTE — DISCHARGE INSTRUCTIONS
Heart Failure Resources:  Heart Failure Interactive Workbook:  Go to https://[employer].Jongla/publication/?s=663865 for a Free Heart Failure Interactive Workbook provided by The American Heart Association. This interactive workbook will provide information on Healthier Living with Heart Failure. Please copy and paste link into search bar. Use your mouse to scroll through the pages.    HF [place] leila:   Heart Failure Free smart phone leila available for iPhone and Android download. Use your phone to track sodium intake, fluid intake, symptoms, and weight.     Low Sodium Diet / Recipes:  Go to www.[employer] website for “renal” diet which is Low Sodium! [employer] is a dialysis company, but this website offers free seasonal cookbooks. Each quarter, they will release 25-30 new recipes with a breakdown of calories, sodium, and glucose. You can also go to www."[employer]/recipes website for free recipes.     Home Exercise Program:   Identification of Green/Yellow/Red zones:  You should be able to identify when you feel good (green zone), if you have 1-2 symptoms of HF (yellow zone), or if you are in need of medical attention (red zone).  In your CHF education folder you were provided a “stop light tool” to outline this information.     We want to you to rate your exertion levels:    Our therapy team has discussed means of identification with you such as the \"Reuben scale.\"  The Reuben rating scale ranges from 6 to 20, where 6 means \"no exertion at all\" and 20 means \"maximal exertion.\" The goal is to use this to gauge how much effort it is taking for you to do your normal daily tasks.   You should be able to recognize when too much exertion is being expended.    Elements of Energy Conservation:   Prioritize/Plan: Decide what needs to be done today, and what can wait for a later date, write to do lists, plan ahead to avoid extra trips, and gather supplies and equipment needed before starting an activity.   Position:  Avoid tiring and awkward posture that may impair breathing.  Pace: Slow and steady pace, never rushing!  Pursed lip breathing.  Pursed Lip Breathing: \"Smell the roses, blow out the candles\".

## 2025-07-21 NOTE — CARE COORDINATION
Chart reviewed day 3. Care managed by ID and IM. Following for culture results and final atbx rec's. Patient from LTC at [facility], no barrier to return. Marion Burgos RN

## 2025-07-21 NOTE — PLAN OF CARE
Problem: Pain  Goal: Verbalizes/displays adequate comfort level or baseline comfort level  Outcome: Progressing  Flowsheets (Taken 7/21/2025 1543)  Verbalizes/displays adequate comfort level or baseline comfort level:   Assess pain using appropriate pain scale   Encourage patient to monitor pain and request assistance   Administer analgesics based on type and severity of pain and evaluate response   Notify Licensed Independent Practitioner if interventions unsuccessful or patient reports new pain     Problem: Safety - Adult  Goal: Free from fall injury  Outcome: Progressing  Flowsheets (Taken 7/21/2025 1543)  Free From Fall Injury:   Instruct family/caregiver on patient safety   Based on caregiver fall risk screen, instruct family/caregiver to ask for assistance with transferring infant if caregiver noted to have fall risk factors     Problem: Skin/Tissue Integrity  Goal: Skin integrity remains intact  Description: 1.  Monitor for areas of redness and/or skin breakdown  2.  Assess vascular access sites hourly  3.  Every 4-6 hours minimum:  Change oxygen saturation probe site  4.  Every 4-6 hours:  If on nasal continuous positive airway pressure, respiratory therapy assess nares and determine need for appliance change or resting period  Outcome: Progressing  Flowsheets (Taken 7/21/2025 1543)  Skin Integrity Remains Intact: Monitor for areas of redness and/or skin breakdown     Problem: Skin/Tissue Integrity - Adult  Goal: Skin integrity remains intact  Description: 1.  Monitor for areas of redness and/or skin breakdown  2.  Assess vascular access sites hourly  3.  Every 4-6 hours minimum:  Change oxygen saturation probe site  4.  Every 4-6 hours:  If on nasal continuous positive airway pressure, respiratory therapy assess nares and determine need for appliance change or resting period  Outcome: Progressing  Flowsheets (Taken 7/21/2025 1543)  Skin Integrity Remains Intact: Monitor for areas of redness and/or skin

## 2025-07-21 NOTE — PROGRESS NOTES
[facility] Medicine Progress Note  V 5.17      Date of Admission: 7/18/2025    Hospital Day: 4      Chief Admission Complaint:  BLE cellulitis, COLLINS    Subjective:      Patient lying in bed resting. No mouth tremoring noted at rest. Daughter at bedside.   Had a long discussion with daughter Ayla and her  concerning her mother's care. She is concerned with her mother's ,mouth twitching, lab work review, and prognosis. All questions were answered to her satisfaction.       Presenting Admission History:     This is an 84 y.o. female with PMHx of chronic diastolic CHF, PAF no longer on coumadin due to recurrent falls, HTN, GERD, presents from [facility] for generalized weakness, COLLINS and ongoing lower extremity cellulitis despite outpatient antibiotics with keflex and doxycycline. Nursing facility reported that she had lab work done on the 16th which was notable for potassium of 2.6. She is currently undergoing treatment for left lower extremity swelling and cellulitis. They also recently increased her Lasix given worsening lower extremity swelling. She has no worsening shortness of breath and on baseline 1-2L. No associated fever.   In the ED, her VS were stable. Labs revealed WBC 22, K 3.1, lactate was normal. Inflammatory markers were elevated. Pt to be admitted for further evaluation.    Had a long discussion with daughter Ayla and her  concerning her mother's care. She is concerned with her mother's ,mouth twitching, lab work review, and prognosis. All questions were answered to her satisfaction.     Assessment/Plan:      BLE cellulitis:   - ID consulted and rec: Changed atbx to cefazolin 2000 mg IV q 8 hrs, Received Vanc and Levaquin on admission. Atbx day 4/7.  - Wound care consulted - venous statsis ulcers  - Blood cx - collected 7/18 - NGTD     - BLE doppler: 7/18 - no evidence of deep of superficial venous thrombosis, BLE     Traumatic left foot second toe proximal phalanx intra-articular  note and/or the medical record, personally reviewed by me on 7/21/2025 for evidence of ADR and Toxic Effects including ARF.     Physical Exam Performed:      General appearance:  No apparent distress  Respiratory:  Normal respiratory effort without tachypnea.   Cardiovascular:  Regular Rate w/ Irregular rhythm.  Abdomen:  Soft, non-tender, non-distended. Bowel sounds normal  Musculoskeletal:  LE/UE edema  Neurologic:  Neurovascularly intact without focal sensory/motor deficits.  Psychiatric:  Alert and oriented    /60   Pulse 84   Temp 97.7 °F (36.5 °C) (Oral)   Resp 20   Ht 1.524 m (5')   Wt 115.6 kg (254 lb 13.6 oz)   SpO2 97%   BMI 49.77 kg/m²     Telemetry:      Personally reviewed and interpreted telemetry (Rhythm Strip) on 7/21/2025.  Patient is currently ON tele demonstrating Rate controlled Afib.    Diet: ADULT DIET; Dysphagia - Minced and Moist; no dentures    DVT Prophylaxis: SQ Heparin    Code status: Limited    PT/OT Eval Status: Not yet ordered    Multi-Disciplinary Rounds with Case Management completed on 7/21/2025 with the following recs:     Anticipated Discharge Location: formerly [facility]     Anticipated Discharge Day/Date:  7/22/25    Barriers to Discharge: Clinical Course    Likely rate limiting factor: IV atbx for cellulits    --------------------------------------------------    MDM (any 2 required for High level billing)    A. Problems (any 1)  [x] Acute/Chronic Illness/injury posing ongoing threat to life and/or bodily function without ongoing treatment    [] Severe exacerbation of chronic illness    --------------------------------------------------  B. Risk of Treatment (any 1)    [] Drugs/treatments that require intensive monitoring for toxicity    [x] IV ABX (Vancomycin, Aminoglycosides, etc)     [] Post-Cath/Contrast study requiring serial monitoring    [] IV Narcotic analgesia    [] Aggressive IV diuresis    [] Hypertonic Saline    [] Critical electrolyte abnormalities

## 2025-07-21 NOTE — CONSULTS
[facility]  HEART FAILURE PROGRAM      Annie Singleton 1940    History:  Past Medical History:   Diagnosis Date    Arrhythmia     Arthritis     OSTEOARTHRITIS    Asthma     Atrial fibrillation (HCC)     Diverticula of colon 10/12/2015    GERD (gastroesophageal reflux disease)     Hypertension     IBS (irritable bowel syndrome) 2010       ECHO: 11/19/24   EF 55-60%  HgA1C:   Iron Saturation:  7/19/25   13  Ferritin: 7/19/25  407.0    ACE/ARB/ARNI:   BB:   Spironolactone:  SGLT2:    Last Hospital Admission: 11/26/24  anxiety   [facility]    Advanced Directives: patient has advance directives scanned in the chart    Chart review completed.  Patient a 84 year old female, admitted for cellulitis.  Hospital day 3 currently on C3 level of care.   Per hospital medicine IV antibiotics in place for cellulitis.   Ortho has been consulted for left foot second toe fracture.  Potassium replacement for hypokalemia.   CHF chronic per documentation and dry weight is 250lbs  current weight today is 254lbs.  Continuing diuretics as ordered.   Code status changed over the weekend to limited x 3 status.      Plans are that patient will return to [facility] where she lives LT when medically stable. There they will monitor for s/s of CHF including daily weights.  They will also assist with diet and fluid restrictions.     Patient recent weights and intake/output reviewed:    Patient Vitals for the past 96 hrs (Last 3 readings):   Weight   07/18/25 1215 115.6 kg (254 lb 13.6 oz)         Intake/Output Summary (Last 24 hours) at 7/21/2025 0914  Last data filed at 7/21/2025 0501  Gross per 24 hour   Intake 620 ml   Output 1450 ml   Net -830 ml       Education Time: Chart review completed    Laura Emanuel, MATHEW     7/21/2025 9:14 AM

## 2025-07-21 NOTE — ACP (ADVANCE CARE PLANNING)
Advance Care Planning     Palliative Team Advance Care Planning (ACP) Conversation    Date of Conversation: 07/21/25    Individuals present for the conversation: Daughter , Ayla and Ayla's spouse     ACP documents on file prior to discussion:  -Other The only AD in this EMR thus far is pts [place] DNR, signed in 2024, and \"DNR-CC\" was the designation checked.     Previously completed document/s not on file: Ayla reports pt having executed a HCPOA and Living Will and agree's to have her sister, Renae, provide copies. document was completed previously but it is not available for review. This writer requested a copy of the document for patient's chart.     Healthcare Decision Maker:    Primary Decision Maker: Renae Singleton - Child - [phone]     Resuscitation Status:   Code Status: Limited  To reflect DNR/DNR, resuscitative medications ok    Documentation Completed:  -No new documents completed.    Palliative Care Initial Note  Palliative Care Admit date: 7/21/25  Reason for c/s:  [facility]    Advance Directives: Explained to Ayla, in the absence of pts HCPOA, that all three sister's are viewed as pts collective NOK.  She said she'd ask Renae to provide copies of AD's.     Plan of care/goals:  Ayla reports pt adjusting to LTC well.  She is very pleased w/ facility though she has concerns for the monitoring/management of pts wounds,cellulitis & shared her intention to speak w/ facility as to her concerns.  Ayla documents pts wound visits and care providers.  Pt slept soundly during visit, did not stir w/ our conversation.  Ayla noted occasional foot twitching and spoke a great deal about her concern for pts neck/head/face twitching and intends to d/w hospitalist.  She is concerned twitching is r/t abx.     Social/Spiritual: Pt resides @ [place] and has lived there just short of one (1) year.  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

## 2025-07-21 NOTE — CONSULTS
Palliative Care Initial Note  Palliative Care Admit date: 7/21/25    ACP/palcare referral noted

## 2025-07-21 NOTE — CONSULTS
Consult Call Back    Who:Benjie Hathaway RN   Date:7/21/2025,  Time:2:44 PM    Electronically signed by Susana Almendarez on 7/21/25 at 2:44 PM EDT

## 2025-07-21 NOTE — PROGRESS NOTES
Infectious Disease Follow up Notes    CC :  RLE cellulitis      Antibiotics:  Ancef 2g q8     Admit Date:   7/18/2025  Hospital Day: 4    Subjective:   She remains AF   Patient and family report mouth twitching and small jerking movements in the neck and shoulder.  All seems to have abated to day   The RLE is feeling much better     Objective:     Patient Vitals for the past 8 hrs:   BP Temp Temp src Pulse Resp SpO2   07/21/25 1426 (!) 144/82 98.1 °F (36.7 °C) Oral 80 20 98 %   07/21/25 1121 118/72 97.7 °F (36.5 °C) Oral 71 18 98 %       EXAM:  General:  alert, NAD  Chronically ill appearing, weak    HEENT:  NCAT, PERRL, sclera anicteric   MMM, no thrush     NECK:   Supple     LUNGS:   non-labored breathing      ABD: Soft, NT    EXT: Marked improvement in the RLE cellulitis   L 2nd toe healing, no local erythema     SKIN:  No focal rash           LINE:     PIV In place     Scheduled Meds:   iron sucrose (VENOFER) 200 mg in sodium chloride 0.9 % 100 mL IVPB  200 mg IntraVENous Q24H    gabapentin  200 mg Oral TID    methocarbamol  500 mg Oral TID    sodium chloride flush  5-40 mL IntraVENous 2 times per day    heparin (porcine)  5,000 Units SubCUTAneous 3 times per day    aspirin  81 mg Oral Daily    atorvastatin  40 mg Oral Nightly    bumetanide  2 mg Oral Daily    citalopram  40 mg Oral Daily    pantoprazole  40 mg Oral Daily    polyethylene glycol  17 g Oral Daily    potassium chloride  40 mEq Oral Daily with breakfast    ceFAZolin  2,000 mg IntraVENous Q8H    miconazole   Topical Daily       Continuous Infusions:   sodium chloride            Data Review:    Lab Results   Component Value Date    WBC 6.6 07/21/2025    HGB 11.0 (L) 07/21/2025    HCT 32.1 (L) 07/21/2025    MCV 95.8 07/21/2025     07/21/2025     Lab Results   Component Value Date    CREATININE 1.0 07/21/2025    BUN 20 07/21/2025     07/21/2025    K 3.4 (L)

## 2025-07-22 ENCOUNTER — TELEPHONE (OUTPATIENT)
Dept: ORTHOPEDIC SURGERY | Age: 85
End: 2025-07-22

## 2025-07-22 LAB
ANION GAP SERPL CALCULATED.3IONS-SCNC: 7 MMOL/L (ref 3–16)
BACTERIA BLD CULT ORG #2: NORMAL
BACTERIA BLD CULT: NORMAL
BASOPHILS # BLD: 0 K/UL (ref 0–0.2)
BASOPHILS NFR BLD: 0.6 %
BUN SERPL-MCNC: 16 MG/DL (ref 7–20)
CALCIUM SERPL-MCNC: 8.3 MG/DL (ref 8.3–10.6)
CHLORIDE SERPL-SCNC: 93 MMOL/L (ref 99–110)
CO2 SERPL-SCNC: 37 MMOL/L (ref 21–32)
CREAT SERPL-MCNC: 1 MG/DL (ref 0.6–1.2)
DEPRECATED RDW RBC AUTO: 13.8 % (ref 12.4–15.4)
EOSINOPHIL # BLD: 0.3 K/UL (ref 0–0.6)
EOSINOPHIL NFR BLD: 4.7 %
GFR SERPLBLD CREATININE-BSD FMLA CKD-EPI: 56 ML/MIN/{1.73_M2}
GLUCOSE SERPL-MCNC: 97 MG/DL (ref 70–99)
HCT VFR BLD AUTO: 31 % (ref 36–48)
HGB BLD-MCNC: 10.6 G/DL (ref 12–16)
LYMPHOCYTES # BLD: 1.4 K/UL (ref 1–5.1)
LYMPHOCYTES NFR BLD: 19.5 %
MAGNESIUM SERPL-MCNC: 2.26 MG/DL (ref 1.8–2.4)
MCH RBC QN AUTO: 32.6 PG (ref 26–34)
MCHC RBC AUTO-ENTMCNC: 34.3 G/DL (ref 31–36)
MCV RBC AUTO: 94.8 FL (ref 80–100)
MONOCYTES # BLD: 1 K/UL (ref 0–1.3)
MONOCYTES NFR BLD: 13.6 %
NEUTROPHILS # BLD: 4.4 K/UL (ref 1.7–7.7)
NEUTROPHILS NFR BLD: 61.6 %
PLATELET # BLD AUTO: 271 K/UL (ref 135–450)
PMV BLD AUTO: 7.7 FL (ref 5–10.5)
POTASSIUM SERPL-SCNC: 3.6 MMOL/L (ref 3.5–5.1)
RBC # BLD AUTO: 3.27 M/UL (ref 4–5.2)
SODIUM SERPL-SCNC: 137 MMOL/L (ref 136–145)
WBC # BLD AUTO: 7.2 K/UL (ref 4–11)

## 2025-07-22 PROCEDURE — 83735 ASSAY OF MAGNESIUM: CPT

## 2025-07-22 PROCEDURE — 6370000000 HC RX 637 (ALT 250 FOR IP): Performed by: HOSPITALIST

## 2025-07-22 PROCEDURE — 36415 COLL VENOUS BLD VENIPUNCTURE: CPT

## 2025-07-22 PROCEDURE — 99231 SBSQ HOSP IP/OBS SF/LOW 25: CPT | Performed by: INTERNAL MEDICINE

## 2025-07-22 PROCEDURE — 6360000002 HC RX W HCPCS: Performed by: HOSPITALIST

## 2025-07-22 PROCEDURE — 6370000000 HC RX 637 (ALT 250 FOR IP): Performed by: INTERNAL MEDICINE

## 2025-07-22 PROCEDURE — 6370000000 HC RX 637 (ALT 250 FOR IP): Performed by: NURSE PRACTITIONER

## 2025-07-22 PROCEDURE — 85025 COMPLETE CBC W/AUTO DIFF WBC: CPT

## 2025-07-22 PROCEDURE — 1200000000 HC SEMI PRIVATE

## 2025-07-22 PROCEDURE — 2700000000 HC OXYGEN THERAPY PER DAY

## 2025-07-22 PROCEDURE — 6360000002 HC RX W HCPCS: Performed by: INTERNAL MEDICINE

## 2025-07-22 PROCEDURE — 80048 BASIC METABOLIC PNL TOTAL CA: CPT

## 2025-07-22 PROCEDURE — 6360000002 HC RX W HCPCS: Performed by: NURSE PRACTITIONER

## 2025-07-22 PROCEDURE — 94761 N-INVAS EAR/PLS OXIMETRY MLT: CPT

## 2025-07-22 PROCEDURE — 2500000003 HC RX 250 WO HCPCS: Performed by: INTERNAL MEDICINE

## 2025-07-22 PROCEDURE — 2580000003 HC RX 258: Performed by: NURSE PRACTITIONER

## 2025-07-22 PROCEDURE — 2500000003 HC RX 250 WO HCPCS: Performed by: HOSPITALIST

## 2025-07-22 RX ORDER — CHLORHEXIDINE GLUCONATE ORAL RINSE 1.2 MG/ML
15 SOLUTION DENTAL 2 TIMES DAILY
Status: DISCONTINUED | OUTPATIENT
Start: 2025-07-22 | End: 2025-07-23 | Stop reason: HOSPADM

## 2025-07-22 RX ADMIN — PANTOPRAZOLE SODIUM 40 MG: 40 TABLET, DELAYED RELEASE ORAL at 09:32

## 2025-07-22 RX ADMIN — METHOCARBAMOL 500 MG: 500 TABLET ORAL at 21:28

## 2025-07-22 RX ADMIN — GABAPENTIN 200 MG: 100 CAPSULE ORAL at 21:28

## 2025-07-22 RX ADMIN — GABAPENTIN 200 MG: 100 CAPSULE ORAL at 09:32

## 2025-07-22 RX ADMIN — BUMETANIDE 2 MG: 1 TABLET ORAL at 09:32

## 2025-07-22 RX ADMIN — ACETAMINOPHEN 650 MG: 325 TABLET ORAL at 00:14

## 2025-07-22 RX ADMIN — GABAPENTIN 200 MG: 100 CAPSULE ORAL at 14:33

## 2025-07-22 RX ADMIN — ACETAMINOPHEN 650 MG: 325 TABLET ORAL at 21:28

## 2025-07-22 RX ADMIN — SALINE NASAL SPRAY 1 SPRAY: 1.5 SOLUTION NASAL at 12:29

## 2025-07-22 RX ADMIN — WATER 2000 MG: 1 INJECTION INTRAMUSCULAR; INTRAVENOUS; SUBCUTANEOUS at 06:42

## 2025-07-22 RX ADMIN — WATER 2000 MG: 1 INJECTION INTRAMUSCULAR; INTRAVENOUS; SUBCUTANEOUS at 00:10

## 2025-07-22 RX ADMIN — POLYETHYLENE GLYCOL 3350 17 G: 17 POWDER, FOR SOLUTION ORAL at 09:33

## 2025-07-22 RX ADMIN — METHOCARBAMOL 500 MG: 500 TABLET ORAL at 14:40

## 2025-07-22 RX ADMIN — WATER 2000 MG: 1 INJECTION INTRAMUSCULAR; INTRAVENOUS; SUBCUTANEOUS at 14:40

## 2025-07-22 RX ADMIN — ASPIRIN 81 MG: 81 TABLET, CHEWABLE ORAL at 09:33

## 2025-07-22 RX ADMIN — HEPARIN SODIUM 5000 UNITS: 5000 INJECTION INTRAVENOUS; SUBCUTANEOUS at 21:28

## 2025-07-22 RX ADMIN — CHLORHEXIDINE GLUCONATE 15 ML: 1.2 RINSE ORAL at 14:40

## 2025-07-22 RX ADMIN — METHOCARBAMOL 500 MG: 500 TABLET ORAL at 09:32

## 2025-07-22 RX ADMIN — ATORVASTATIN CALCIUM 40 MG: 40 TABLET, FILM COATED ORAL at 21:28

## 2025-07-22 RX ADMIN — CEPHALEXIN 500 MG: 250 CAPSULE ORAL at 16:49

## 2025-07-22 RX ADMIN — Medication 10 ML: at 21:29

## 2025-07-22 RX ADMIN — POTASSIUM CHLORIDE 40 MEQ: 1500 TABLET, EXTENDED RELEASE ORAL at 09:33

## 2025-07-22 RX ADMIN — CITALOPRAM HYDROBROMIDE 40 MG: 20 TABLET ORAL at 09:31

## 2025-07-22 RX ADMIN — ACETAMINOPHEN 650 MG: 325 TABLET ORAL at 08:51

## 2025-07-22 RX ADMIN — HEPARIN SODIUM 5000 UNITS: 5000 INJECTION INTRAVENOUS; SUBCUTANEOUS at 06:42

## 2025-07-22 RX ADMIN — CEPHALEXIN 500 MG: 250 CAPSULE ORAL at 21:28

## 2025-07-22 RX ADMIN — HEPARIN SODIUM 5000 UNITS: 5000 INJECTION INTRAVENOUS; SUBCUTANEOUS at 14:34

## 2025-07-22 RX ADMIN — IRON SUCROSE 200 MG: 20 INJECTION, SOLUTION INTRAVENOUS at 09:54

## 2025-07-22 RX ADMIN — MICONAZOLE NITRATE: 2 POWDER TOPICAL at 06:43

## 2025-07-22 ASSESSMENT — PAIN DESCRIPTION - LOCATION
LOCATION: ABDOMEN
LOCATION: HEAD

## 2025-07-22 ASSESSMENT — PAIN DESCRIPTION - DESCRIPTORS: DESCRIPTORS: ACHING

## 2025-07-22 ASSESSMENT — PAIN SCALES - GENERAL
PAINLEVEL_OUTOF10: 4
PAINLEVEL_OUTOF10: 3
PAINLEVEL_OUTOF10: 9

## 2025-07-22 NOTE — PROGRESS NOTES
[facility] Medicine Progress Note  V 5.17      Date of Admission: 7/18/2025    Hospital Day: 5      Chief Admission Complaint:  BLE cellulitis, COLLINS    Subjective:      Patient awake, lying in bed. She reports that she is feeling better and has not experienced lip/mouth twitching all night or today thus far. Discussed with pt's nurse at bedside to monitor for return of twitching.    Possible discharge tomorrow on oral antx.     Presenting Admission History:     This is an 84 y.o. female with PMHx of chronic diastolic CHF, PAF no longer on coumadin due to recurrent falls, HTN, GERD, presents from [facility] for generalized weakness, COLLINS and ongoing lower extremity cellulitis despite outpatient antibiotics with keflex and doxycycline. Nursing facility reported that she had lab work done on the 16th which was notable for potassium of 2.6. She is currently undergoing treatment for left lower extremity swelling and cellulitis. They also recently increased her Lasix given worsening lower extremity swelling. She has no worsening shortness of breath and on baseline 1-2L. No associated fever.   In the ED, her VS were stable. Labs revealed WBC 22, K 3.1, lactate was normal. Inflammatory markers were elevated. Pt to be admitted for further evaluation.    Had a long discussion with daughter Ayla and her  concerning her mother's care. She is concerned with her mother's ,mouth twitching, lab work review, and prognosis. All questions were answered to her satisfaction.     Assessment/Plan:      BLE cellulitis:   - ID consulted and rec:  Received Vanc and Levaquin on admission. Changed atbx to cefazolin 2000 mg IV q 8 hrs, Antx day 5/7.  - Wound care consulted - venous statsis ulcers  - Blood cx - collected 7/18 - NGTD     - BLE doppler: 7/18 - no evidence of deep of superficial venous thrombosis, BLE     Traumatic left foot second toe proximal phalanx intra-articular fracture with laceration:  -Treated 7/7/25 in ED,

## 2025-07-22 NOTE — PLAN OF CARE
Problem: Pain  Goal: Verbalizes/displays adequate comfort level or baseline comfort level  7/22/2025 0235 by Sommer Norman RN  Outcome: Progressing  7/21/2025 1543 by Lino Landry, RN  Outcome: Progressing  Flowsheets (Taken 7/21/2025 1543)  Verbalizes/displays adequate comfort level or baseline comfort level:   Assess pain using appropriate pain scale   Encourage patient to monitor pain and request assistance   Administer analgesics based on type and severity of pain and evaluate response   Notify Licensed Independent Practitioner if interventions unsuccessful or patient reports new pain     Problem: Safety - Adult  Goal: Free from fall injury  7/22/2025 0235 by Sommer Norman RN  Outcome: Progressing  7/21/2025 1543 by Lino Landry RN  Outcome: Progressing  Flowsheets (Taken 7/21/2025 1543)  Free From Fall Injury:   Instruct family/caregiver on patient safety   Based on caregiver fall risk screen, instruct family/caregiver to ask for assistance with transferring infant if caregiver noted to have fall risk factors     Problem: Skin/Tissue Integrity  Goal: Skin integrity remains intact  Description: 1.  Monitor for areas of redness and/or skin breakdown  2.  Assess vascular access sites hourly  3.  Every 4-6 hours minimum:  Change oxygen saturation probe site  4.  Every 4-6 hours:  If on nasal continuous positive airway pressure, respiratory therapy assess nares and determine need for appliance change or resting period  7/22/2025 0235 by Sommer Norman RN  Outcome: Progressing  Flowsheets (Taken 7/21/2025 2000)  Skin Integrity Remains Intact:   Monitor for areas of redness and/or skin breakdown   Assess vascular access sites hourly  7/21/2025 1543 by Lino Landry, RN  Outcome: Progressing  Flowsheets (Taken 7/21/2025 1543)  Skin Integrity Remains Intact: Monitor for areas of redness and/or skin breakdown     Problem: Skin/Tissue Integrity - Adult  Goal: Skin integrity remains intact  Description: 1.

## 2025-07-22 NOTE — PROGRESS NOTES
4 Eyes Skin Assessment     The patient is being assess for  Low Ben    I agree that 2 RN's have performed a thorough Head to Toe Skin Assessment on the patient. ALL assessment sites listed below have been assessed.       Areas assessed by both nurses: Daniele RN, Jenny RN  [x]   Head, Face, and Ears   [x]   Shoulders, Back, and Chest  [x]   Arms, Elbows, and Hands   [x]   Coccyx, Sacrum, and IschIum  [x]   Legs, Feet, and Heels        Does the Patient have Skin Breakdown?  BLE cellulitis, blanchable redness on coccyx.         Ben Prevention initiated:  Yes   Wound Care Orders initiated:  NA      [facility] nurse consulted for Pressure Injury (Stage 3,4, Unstageable, DTI, NWPT, and Complex wounds), New and Established Ostomies:  Yes      Nurse 1 eSignature: Electronically signed by DANIELE ROBLES RN on 7/22/25 at 7:51 PM EDT    **SHARE this note so that the co-signing nurse is able to place an eSignature**    Nurse 2 eSignature: Electronically signed by Jenny Bailey RN on 7/22/25 at 8:02 PM EDT

## 2025-07-22 NOTE — PROGRESS NOTES
Infectious Disease Follow up Notes    CC :  RLE cellulitis      Antibiotics:  Ancef 2g q8     Admit Date:   7/18/2025  Hospital Day: 5    Subjective:   She remains AF   No more twitching noted today  She reports pain and heaviness in the feet.  She can move them without difficulty     Objective:     Patient Vitals for the past 8 hrs:   BP Temp Temp src Pulse Resp SpO2   07/22/25 1130 130/82 98.6 °F (37 °C) Oral 91 18 97 %   07/22/25 0910 -- -- -- 65 -- --   07/22/25 0845 (!) 136/53 98.4 °F (36.9 °C) Oral 72 -- 97 %       EXAM:  General:  alert, NAD  Chronically ill appearing, weak    HEENT:  NCAT, PERRL, sclera anicteric   MMM, no thrush     NECK:   Supple     LUNGS:   non-labored breathing      ABD: Soft, NT    EXT: Nearly resolved RLE cellulitis   L 2nd toe healing, no local erythema     SKIN:  No focal rash           LINE:     PIV In place     Scheduled Meds:   chlorhexidine  15 mL Mouth/Throat BID    cephALEXin  500 mg Oral 4x daily    iron sucrose (VENOFER) 200 mg in sodium chloride 0.9 % 100 mL IVPB  200 mg IntraVENous Q24H    gabapentin  200 mg Oral TID    methocarbamol  500 mg Oral TID    sodium chloride flush  5-40 mL IntraVENous 2 times per day    heparin (porcine)  5,000 Units SubCUTAneous 3 times per day    aspirin  81 mg Oral Daily    atorvastatin  40 mg Oral Nightly    bumetanide  2 mg Oral Daily    citalopram  40 mg Oral Daily    pantoprazole  40 mg Oral Daily    polyethylene glycol  17 g Oral Daily    potassium chloride  40 mEq Oral Daily with breakfast    miconazole   Topical Daily       Continuous Infusions:   sodium chloride            Data Review:    Lab Results   Component Value Date    WBC 7.2 07/22/2025    HGB 10.6 (L) 07/22/2025    HCT 31.0 (L) 07/22/2025    MCV 94.8 07/22/2025     07/22/2025     Lab Results   Component Value Date    CREATININE 1.0 07/22/2025    BUN 16 07/22/2025     07/22/2025    K

## 2025-07-22 NOTE — CARE COORDINATION
Chart reviewed day 4. Care managed by ID and IM. Patient seen by Juanis with palliative care yesterday. See note. Likely transition to po atbx today. D/c pending clinical improvement. Will return to [facility] when medically ready. Marion Burgos RN

## 2025-07-22 NOTE — PROGRESS NOTES
Pt complaining of stuffy nose and requesting saline spray. Pt also requesting topical medication or mouth rinse for sores in mouth d/t dentures. Secure message sent to hospitalist to inform. Currently awaiting response. Electronically signed by DANIELE ROBLES RN on 7/22/25 at 11:45 AM EDT

## 2025-07-22 NOTE — PROGRESS NOTES
Occupational Therapy    Orders received and chart review completed - RN cleared pt for therapy but upon arrival student nurse present with instructor and reports they need increased time to provide pt medication. Will follow up as schedule allows.     June Gutierrez, OTR/L

## 2025-07-23 VITALS
SYSTOLIC BLOOD PRESSURE: 138 MMHG | TEMPERATURE: 98 F | RESPIRATION RATE: 18 BRPM | DIASTOLIC BLOOD PRESSURE: 65 MMHG | OXYGEN SATURATION: 97 % | BODY MASS INDEX: 53.97 KG/M2 | HEART RATE: 90 BPM | HEIGHT: 60 IN | WEIGHT: 274.91 LBS

## 2025-07-23 LAB
ANION GAP SERPL CALCULATED.3IONS-SCNC: 7 MMOL/L (ref 3–16)
BASOPHILS # BLD: 0.1 K/UL (ref 0–0.2)
BASOPHILS NFR BLD: 1.2 %
BUN SERPL-MCNC: 15 MG/DL (ref 7–20)
CALCIUM SERPL-MCNC: 8.4 MG/DL (ref 8.3–10.6)
CHLORIDE SERPL-SCNC: 96 MMOL/L (ref 99–110)
CO2 SERPL-SCNC: 34 MMOL/L (ref 21–32)
CREAT SERPL-MCNC: 0.8 MG/DL (ref 0.6–1.2)
DEPRECATED RDW RBC AUTO: 14.3 % (ref 12.4–15.4)
EOSINOPHIL # BLD: 0.4 K/UL (ref 0–0.6)
EOSINOPHIL NFR BLD: 5.3 %
GFR SERPLBLD CREATININE-BSD FMLA CKD-EPI: 73 ML/MIN/{1.73_M2}
GLUCOSE SERPL-MCNC: 99 MG/DL (ref 70–99)
HCT VFR BLD AUTO: 30.8 % (ref 36–48)
HGB BLD-MCNC: 10.6 G/DL (ref 12–16)
LYMPHOCYTES # BLD: 1.4 K/UL (ref 1–5.1)
LYMPHOCYTES NFR BLD: 19.1 %
MAGNESIUM SERPL-MCNC: 2.3 MG/DL (ref 1.8–2.4)
MCH RBC QN AUTO: 32.9 PG (ref 26–34)
MCHC RBC AUTO-ENTMCNC: 34.5 G/DL (ref 31–36)
MCV RBC AUTO: 95.2 FL (ref 80–100)
MONOCYTES # BLD: 1.1 K/UL (ref 0–1.3)
MONOCYTES NFR BLD: 14.6 %
NEUTROPHILS # BLD: 4.4 K/UL (ref 1.7–7.7)
NEUTROPHILS NFR BLD: 59.8 %
NT-PROBNP SERPL-MCNC: 2339 PG/ML (ref 0–449)
PLATELET # BLD AUTO: 245 K/UL (ref 135–450)
PMV BLD AUTO: 7.7 FL (ref 5–10.5)
POTASSIUM SERPL-SCNC: 3.8 MMOL/L (ref 3.5–5.1)
RBC # BLD AUTO: 3.24 M/UL (ref 4–5.2)
SODIUM SERPL-SCNC: 137 MMOL/L (ref 136–145)
WBC # BLD AUTO: 7.3 K/UL (ref 4–11)

## 2025-07-23 PROCEDURE — 85025 COMPLETE CBC W/AUTO DIFF WBC: CPT

## 2025-07-23 PROCEDURE — 6370000000 HC RX 637 (ALT 250 FOR IP): Performed by: HOSPITALIST

## 2025-07-23 PROCEDURE — 83880 ASSAY OF NATRIURETIC PEPTIDE: CPT

## 2025-07-23 PROCEDURE — 6370000000 HC RX 637 (ALT 250 FOR IP): Performed by: INTERNAL MEDICINE

## 2025-07-23 PROCEDURE — 36415 COLL VENOUS BLD VENIPUNCTURE: CPT

## 2025-07-23 PROCEDURE — 83735 ASSAY OF MAGNESIUM: CPT

## 2025-07-23 PROCEDURE — 2500000003 HC RX 250 WO HCPCS: Performed by: HOSPITALIST

## 2025-07-23 PROCEDURE — 6360000002 HC RX W HCPCS: Performed by: NURSE PRACTITIONER

## 2025-07-23 PROCEDURE — 6360000002 HC RX W HCPCS: Performed by: HOSPITALIST

## 2025-07-23 PROCEDURE — 2580000003 HC RX 258: Performed by: NURSE PRACTITIONER

## 2025-07-23 PROCEDURE — 80048 BASIC METABOLIC PNL TOTAL CA: CPT

## 2025-07-23 PROCEDURE — 6370000000 HC RX 637 (ALT 250 FOR IP): Performed by: NURSE PRACTITIONER

## 2025-07-23 RX ORDER — GABAPENTIN 100 MG/1
200 CAPSULE ORAL 3 TIMES DAILY
DISCHARGE
Start: 2025-07-23 | End: 2025-08-22

## 2025-07-23 RX ORDER — CEPHALEXIN 500 MG/1
500 CAPSULE ORAL 4 TIMES DAILY
DISCHARGE
Start: 2025-07-23 | End: 2025-07-27

## 2025-07-23 RX ORDER — METHOCARBAMOL 500 MG/1
500 TABLET, FILM COATED ORAL 3 TIMES DAILY
DISCHARGE
Start: 2025-07-23

## 2025-07-23 RX ADMIN — Medication 10 ML: at 09:50

## 2025-07-23 RX ADMIN — GABAPENTIN 200 MG: 100 CAPSULE ORAL at 09:43

## 2025-07-23 RX ADMIN — ACETAMINOPHEN 650 MG: 325 TABLET ORAL at 12:17

## 2025-07-23 RX ADMIN — IRON SUCROSE 200 MG: 20 INJECTION, SOLUTION INTRAVENOUS at 09:56

## 2025-07-23 RX ADMIN — METHOCARBAMOL 500 MG: 500 TABLET ORAL at 09:43

## 2025-07-23 RX ADMIN — ASPIRIN 81 MG: 81 TABLET, CHEWABLE ORAL at 09:43

## 2025-07-23 RX ADMIN — CITALOPRAM HYDROBROMIDE 40 MG: 20 TABLET ORAL at 09:43

## 2025-07-23 RX ADMIN — CHLORHEXIDINE GLUCONATE 15 ML: 1.2 RINSE ORAL at 09:57

## 2025-07-23 RX ADMIN — BUMETANIDE 2 MG: 1 TABLET ORAL at 09:43

## 2025-07-23 RX ADMIN — POTASSIUM CHLORIDE 40 MEQ: 1500 TABLET, EXTENDED RELEASE ORAL at 09:43

## 2025-07-23 RX ADMIN — PANTOPRAZOLE SODIUM 40 MG: 40 TABLET, DELAYED RELEASE ORAL at 09:43

## 2025-07-23 RX ADMIN — HEPARIN SODIUM 5000 UNITS: 5000 INJECTION INTRAVENOUS; SUBCUTANEOUS at 06:34

## 2025-07-23 RX ADMIN — MICONAZOLE NITRATE: 2 POWDER TOPICAL at 06:34

## 2025-07-23 RX ADMIN — CEPHALEXIN 500 MG: 250 CAPSULE ORAL at 09:43

## 2025-07-23 ASSESSMENT — PAIN DESCRIPTION - LOCATION
LOCATION: HEAD
LOCATION: LEG

## 2025-07-23 ASSESSMENT — PAIN DESCRIPTION - ORIENTATION: ORIENTATION: MID

## 2025-07-23 ASSESSMENT — PAIN DESCRIPTION - DESCRIPTORS
DESCRIPTORS: ACHING
DESCRIPTORS: ACHING

## 2025-07-23 ASSESSMENT — PAIN SCALES - GENERAL
PAINLEVEL_OUTOF10: 2
PAINLEVEL_OUTOF10: 8

## 2025-07-23 ASSESSMENT — PAIN - FUNCTIONAL ASSESSMENT: PAIN_FUNCTIONAL_ASSESSMENT: PREVENTS OR INTERFERES SOME ACTIVE ACTIVITIES AND ADLS

## 2025-07-23 NOTE — PLAN OF CARE
Problem: Pain  Goal: Verbalizes/displays adequate comfort level or baseline comfort level  7/23/2025 1356 by Natacha Wallis RN  Outcome: Adequate for Discharge  7/23/2025 1052 by Natacha Wallis RN  Outcome: Progressing     Problem: Safety - Adult  Goal: Free from fall injury  7/23/2025 1356 by Natacha Wallis RN  Outcome: Adequate for Discharge  7/23/2025 1052 by Natacha Wallis RN  Outcome: Progressing     Problem: Skin/Tissue Integrity  Goal: Skin integrity remains intact  Description: 1.  Monitor for areas of redness and/or skin breakdown  2.  Assess vascular access sites hourly  3.  Every 4-6 hours minimum:  Change oxygen saturation probe site  4.  Every 4-6 hours:  If on nasal continuous positive airway pressure, respiratory therapy assess nares and determine need for appliance change or resting period  7/23/2025 1356 by Natacha Wallis RN  Outcome: Adequate for Discharge  7/23/2025 1052 by Natacha Wallis RN  Outcome: Progressing  Flowsheets (Taken 7/22/2025 2115 by Sommer Norman RN)  Skin Integrity Remains Intact:   Monitor for areas of redness and/or skin breakdown   Assess vascular access sites hourly   Turn and reposition as indicated     Problem: Skin/Tissue Integrity - Adult  Goal: Skin integrity remains intact  Description: 1.  Monitor for areas of redness and/or skin breakdown  2.  Assess vascular access sites hourly  3.  Every 4-6 hours minimum:  Change oxygen saturation probe site  4.  Every 4-6 hours:  If on nasal continuous positive airway pressure, respiratory therapy assess nares and determine need for appliance change or resting period  7/23/2025 1356 by Natacha Wallis RN  Outcome: Adequate for Discharge  7/23/2025 1052 by Natacha Wallis RN  Outcome: Progressing  Flowsheets (Taken 7/22/2025 2115 by Sommer Norman RN)  Skin Integrity Remains Intact:   Monitor for areas of redness and/or skin breakdown   Assess vascular access sites hourly   Turn and reposition as indicated

## 2025-07-23 NOTE — PROGRESS NOTES
Report called to Ayanna at [facility]. All questions answered. IV removed per protocol, pt jinny well. All belongings packed up with daughter.

## 2025-07-23 NOTE — PLAN OF CARE
Problem: Pain  Goal: Verbalizes/displays adequate comfort level or baseline comfort level  Outcome: Progressing     Problem: Safety - Adult  Goal: Free from fall injury  Outcome: Progressing     Problem: Skin/Tissue Integrity  Goal: Skin integrity remains intact  Description: 1.  Monitor for areas of redness and/or skin breakdown  2.  Assess vascular access sites hourly  3.  Every 4-6 hours minimum:  Change oxygen saturation probe site  4.  Every 4-6 hours:  If on nasal continuous positive airway pressure, respiratory therapy assess nares and determine need for appliance change or resting period  Outcome: Progressing  Flowsheets (Taken 7/22/2025 2115 by Sommer Norman RN)  Skin Integrity Remains Intact:   Monitor for areas of redness and/or skin breakdown   Assess vascular access sites hourly   Turn and reposition as indicated     Problem: Skin/Tissue Integrity - Adult  Goal: Skin integrity remains intact  Description: 1.  Monitor for areas of redness and/or skin breakdown  2.  Assess vascular access sites hourly  3.  Every 4-6 hours minimum:  Change oxygen saturation probe site  4.  Every 4-6 hours:  If on nasal continuous positive airway pressure, respiratory therapy assess nares and determine need for appliance change or resting period  Outcome: Progressing  Flowsheets (Taken 7/22/2025 2115 by Sommer Norman RN)  Skin Integrity Remains Intact:   Monitor for areas of redness and/or skin breakdown   Assess vascular access sites hourly   Turn and reposition as indicated

## 2025-07-23 NOTE — CARE COORDINATION
CASE MANAGEMENT DISCHARGE SUMMARY      Discharge to: [facility] with Part B therapy upon return.     Precertification completed: na  Hospital Exemption Notification (HENS) completed: na    IMM given: 7/21    New Durable Medical Equipment ordered/agency: none    Transportation:    Family/car:   Medical Transport explained to pt/family. Pt/family voice no agency preference.    Agency used:[place]    time:130   Ambulance form completed: Yes    Confirmed discharge plan with:     Patient: yes     Family:  yes     Facility/Agency, name:  [facility] faxed [phone]. Eval per Kiera FRAGOSO. Orders pulled per Mirna Dhillon.    Phone number for report to facility: [phone]     RN, name: Natacha    Note: Discharging nurse to complete [facility], reconcile [facility], and place final copy with patient's discharge packet. RN to ensure that written prescriptions for  Level II medications are sent with patient to the facility as per protocol.      Marion Burgos RN

## 2025-07-23 NOTE — PLAN OF CARE
Problem: Pain  Goal: Verbalizes/displays adequate comfort level or baseline comfort level  Outcome: Progressing     Problem: Safety - Adult  Goal: Free from fall injury  Outcome: Progressing  Flowsheets (Taken 7/22/2025 1503)  Free From Fall Injury: Instruct family/caregiver on patient safety     Problem: Skin/Tissue Integrity  Goal: Skin integrity remains intact  Description: 1.  Monitor for areas of redness and/or skin breakdown  2.  Assess vascular access sites hourly  3.  Every 4-6 hours minimum:  Change oxygen saturation probe site  4.  Every 4-6 hours:  If on nasal continuous positive airway pressure, respiratory therapy assess nares and determine need for appliance change or resting period  Outcome: Progressing  Flowsheets (Taken 7/22/2025 1503)  Skin Integrity Remains Intact: Monitor for areas of redness and/or skin breakdown     Problem: Skin/Tissue Integrity - Adult  Goal: Skin integrity remains intact  Description: 1.  Monitor for areas of redness and/or skin breakdown  2.  Assess vascular access sites hourly  3.  Every 4-6 hours minimum:  Change oxygen saturation probe site  4.  Every 4-6 hours:  If on nasal continuous positive airway pressure, respiratory therapy assess nares and determine need for appliance change or resting period  Outcome: Progressing  Flowsheets (Taken 7/22/2025 1503)  Skin Integrity Remains Intact: Monitor for areas of redness and/or skin breakdown

## 2025-07-23 NOTE — DISCHARGE SUMMARY
Hospital Medicine Discharge Summary    Patient: Annie Singleton   : 1940     Hospital:  [facility]  Admit Date: 2025   Discharge Date:   25  Disposition:  LTC @ [facility]   Code status:  Limited (DNR/CCA) w/ NO intubation/re-intubation at the time of arrest, defibrillation/cardioversion, or chest compressions  Condition at Discharge: Stable  Primary Care Provider: Zachary Grimm MD    Admitting Provider: Pedro Khan MD  Discharge Provider: KIRILL Underwood     Discharge Diagnoses:      Active Hospital Problems    Diagnosis     Cellulitis [L03.90]     Cellulitis of right leg [L03.115]     COLLINS (acute kidney injury) [N17.9]     Generalized weakness [R53.1]        Presenting Admission History:    This is an 84 y.o. female with PMHx of chronic diastolic CHF, PAF no longer on coumadin due to recurrent falls, HTN, GERD, presents from [facility] for generalized weakness, COLLINS and ongoing lower extremity cellulitis despite outpatient antibiotics with keflex and doxycycline. Nursing facility reported that she had lab work done on the  which was notable for potassium of 2.6. She is currently undergoing treatment for left lower extremity swelling and cellulitis. They also recently increased her Lasix given worsening lower extremity swelling. She has no worsening shortness of breath and on baseline 1-2L. No associated fever.   In the ED, her VS were stable. Labs revealed WBC 22, K 3.1, lactate was normal. Inflammatory markers were elevated. Pt to be admitted for further evaluation.     Had a long discussion with daughter Ayla and her  concerning her mother's care. She is concerned with her mother's ,mouth twitching, lab work review, and prognosis. All questions were answered to her satisfaction.      Assessment/Plan:      BLE cellulitis:   - ID consulted and rec:  Received Vanc and Levaquin on admission. Changed atbx to cefazolin 2000 mg IV q 8        Significant Test Results    Vascular duplex lower extremity venous bilateral  Result Date: 7/18/2025    No evidence of deep or superficial venous thrombosis in the visualize veins of the bilateral lower extremities.     XR CHEST PORTABLE  Result Date: 7/18/2025  PORTABLE CHEST. Comparison Study: 11/25/2024 HISTORY: Short of breath, possible aspiration FINDINGS: Patient is rotated in this exam. Heart size appears within normal limits. Mild prominence of lung markings with mild bibasilar airspace disease, atelectasis versus infiltrate. No pleural effusion. Bony structures are intact.     1. Mild bibasilar airspace disease left greater than right, atelectasis versus infiltrate. Correlate clinically. Electronically signed by Landon Harrison MD    XR FOOT LEFT (MIN 3 VIEWS)  Result Date: 7/17/2025  Radiology exam is complete. No Radiologist dictation. Please follow up with ordering provider.     XR FOOT LEFT (MIN 3 VIEWS)  Result Date: 7/7/2025  Clinical history: Laceration to the second toe. Injury. Comparisons: None. FINDINGS: 3 projections of the left foot. Diffuse midfoot and forefoot soft tissue swelling. Plantar calcaneal spur. Nondisplaced fracture of the base of the proximal phalanx of the left second toe along its medial aspect with intra-articular extension into the second MTP joint. Severe bunion deformity at the first MTP joint.     1. Nondisplaced fracture at the base of the second toe proximal phalanx with intra-articular extension at the second MTP joint. 2. Severe bunion deformity of the first MTP joint. Electronically signed by Aleksandr Trevino MD      Consults:     PHARMACY TO DOSE VANCOMYCIN  IP CONSULT TO HOSPITALIST  IP CONSULT TO INFECTIOUS DISEASES  IP CONSULT TO PHARMACY  IP CONSULT TO PALLIATIVE CARE  IP CONSULT TO HEART FAILURE NURSE/COORDINATOR    Labs:     Recent Labs     07/21/25  0723 07/22/25  0537 07/23/25  0611   WBC 6.6 7.2 7.3   HGB 11.0* 10.6* 10.6*   HCT 32.1* 31.0* 30.8*   PLT  281 271 245     Recent Labs     07/21/25  0548 07/22/25  0537 07/23/25  0611    137 137   K 3.4* 3.6 3.8   CL 93* 93* 96*   CO2 39* 37* 34*   BUN 20 16 15   CREATININE 1.0 1.0 0.8   CALCIUM 8.5 8.3 8.4   MG 2.39 2.26 2.30     Recent Labs     07/21/25  0548 07/23/25  0611   PROBNP 1,800* 2,339*     No results for input(s): \"LABA1C\" in the last 72 hours.  No results for input(s): \"AST\", \"ALT\", \"BILIDIR\", \"BILITOT\", \"ALKPHOS\" in the last 72 hours.  No results for input(s): \"INR\", \"LACTA\", \"TSH\" in the last 72 hours.    Urine Cultures:   Lab Results   Component Value Date/Time    LABURIN  07/31/2021 05:18 PM     <50,000 CFU/ml mixed skin/urogenital satya. No further workup     Blood Cultures:   Lab Results   Component Value Date/Time    BC No Growth after 4 days of incubation. 07/18/2025 04:35 AM     Lab Results   Component Value Date/Time    BLOODCULT2 No Growth after 4 days of incubation. 07/18/2025 04:35 AM     Organism:   Lab Results   Component Value Date/Time    ORG Pseudomonas aeruginosa DNA Detected 11/26/2024 08:05 AM    ORG Pseudomonas aeruginosa 11/26/2024 08:05 AM       Signed:    KIRILL Underwood

## 2025-07-23 NOTE — ACP (ADVANCE CARE PLANNING)
Rec'd copy of pts [place] poa and will place in chart to be scanned.   Although it mostly references fiduciary matters, there is a clear sentence which states that Ayla and/or Renae are to \"take charge of my person in case of sickness or disability.\"

## 2025-07-26 NOTE — PROGRESS NOTES
Physician Progress Note      PATIENT:               MONET COREA  CSN #:                  [id number]  :                       1940  ADMIT DATE:       2025 3:17 AM  DISCH DATE:        2025 2:11 PM  RESPONDING  PROVIDER #:        ANDREW ROY - NP          QUERY TEXT:    Sepsis was documented in the medical record in the ED.  The diagnosis was not   noted in subsequent documentation.  Please clarify the status of this   condition.    The clinical indicators include:  ED: Septicemia--84 y.o. female with a PMH significant for hypertension, atrial   fibrillation presented today for generalized weakness and abnormal lab. She   was found to have a lab with a potassium of 2.7.  ED: Severe sepsis identified date: 3:30AM time: 2025  Fluid Resuscitation Rationale: less than 30mL/kg because of concern for fluid   overload and patient/patient advocate made an informed decision to decline   more aggressive fluid resuscitation    WBC on admit 22.7, HR 92, RR-20    DCS: Cellulitis BLE: ID consulted and rec:  Received Vanc and Levaquin on   admission. Changed atbx to cefazolin 2000 mg IV q 8 hrs, transitioned to PO   cephalexin per ID recs  - Wound care consulted - venous stasis ulcers  - Blood cx - collected  - NGTD  Options provided:  -- Severe Sepsis, POA due to cellulitis with organ dysfunction of COLLINS,   confirmed after study.  -- Sepsis without severe sepsis, POA due to cellulitis, confirmed.  -- Sepsis ruled out after study  -- Other - I will add my own diagnosis  -- Disagree - Not applicable / Not valid  -- Disagree - Clinically unable to determine / Unknown  -- Refer to Clinical Documentation Reviewer    PROVIDER RESPONSE TEXT:    Sepsis ruled out after study.    Query created by: Jayson Landeros on 2025 11:11 AM      QUERY TEXT:    BMI 53.69 is documented in the medical record in the [facility].  Based on your   medical judgment, please clarify these findings and document if any of the

## (undated) DEVICE — PUMP SUC IRR TBNG L10FT W/ HNDPC ASSEMB STRYKEFLOW 2

## (undated) DEVICE — STOPCOCK 3-WAY INTRAVENOUS

## (undated) DEVICE — MONOPOLAR CURVED SCISSORS: Brand: ENDOWRIST

## (undated) DEVICE — C-ARM: Brand: UNBRANDED

## (undated) DEVICE — Device

## (undated) DEVICE — COLUMN DRAPE

## (undated) DEVICE — SUTURE VICRYL + SZ 0 L27IN ABSRB VLT L26MM UR-6 5/8 CIR VCP603H

## (undated) DEVICE — TROCAR: Brand: KII FIOS FIRST ENTRY

## (undated) DEVICE — REDUCER: Brand: ENDOWRIST

## (undated) DEVICE — AIRSEAL BIFURCATED SMOKE EVAC FILTERED TUBE SET: Brand: AIRSEAL

## (undated) DEVICE — FENESTRATED BIPOLAR FORCEPS: Brand: ENDOWRIST

## (undated) DEVICE — LIQUIBAND RAPID ADHESIVE 36/CS 0.8ML: Brand: MEDLINE

## (undated) DEVICE — SUTURE VICRYL + SZ 3-0 L18IN ABSRB UD SH 1/2 CIR TAPERCUT NDL VCP864D

## (undated) DEVICE — ARM DRAPE

## (undated) DEVICE — TIP COVER ACCESSORY

## (undated) DEVICE — MARYLAND BIPOLAR FORCEPS: Brand: ENDOWRIST

## (undated) DEVICE — BLADELESS OBTURATOR: Brand: WECK VISTA

## (undated) DEVICE — SOLUTION IV 1000ML 0.9% SOD CHL PH 5 INJ USP VIAFLX PLAS

## (undated) DEVICE — CATHETER CHOLGM 4.5FR L18IN W/ MTL SUPP TB

## (undated) DEVICE — GLOVE,SURG,SENSICARE SLT,LF,PF,7: Brand: MEDLINE

## (undated) DEVICE — MEDIUM-LARGE CLIP APPLIER: Brand: ENDOWRIST

## (undated) DEVICE — SEAL

## (undated) DEVICE — TISSUE RETRIEVAL SYSTEM: Brand: INZII RETRIEVAL SYSTEM

## (undated) DEVICE — AGENT HEMSTAT W2XL4IN OXIDIZED REGENERATED CELOS ABSRB

## (undated) DEVICE — SOLUTION IRRIG 1000ML STRL H2O USP PLAS POUR BTL